# Patient Record
Sex: FEMALE | Race: WHITE | HISPANIC OR LATINO | Employment: FULL TIME | URBAN - METROPOLITAN AREA
[De-identification: names, ages, dates, MRNs, and addresses within clinical notes are randomized per-mention and may not be internally consistent; named-entity substitution may affect disease eponyms.]

---

## 2017-03-07 ENCOUNTER — TRANSFERRED RECORDS (OUTPATIENT)
Dept: HEALTH INFORMATION MANAGEMENT | Facility: CLINIC | Age: 57
End: 2017-03-07

## 2017-03-16 ENCOUNTER — TRANSFERRED RECORDS (OUTPATIENT)
Dept: HEALTH INFORMATION MANAGEMENT | Facility: CLINIC | Age: 57
End: 2017-03-16

## 2017-11-10 ENCOUNTER — TRANSFERRED RECORDS (OUTPATIENT)
Dept: HEALTH INFORMATION MANAGEMENT | Facility: CLINIC | Age: 57
End: 2017-11-10

## 2017-12-21 ENCOUNTER — TRANSFERRED RECORDS (OUTPATIENT)
Dept: HEALTH INFORMATION MANAGEMENT | Facility: CLINIC | Age: 57
End: 2017-12-21

## 2018-01-18 ENCOUNTER — TRANSFERRED RECORDS (OUTPATIENT)
Dept: HEALTH INFORMATION MANAGEMENT | Facility: CLINIC | Age: 58
End: 2018-01-18

## 2018-09-11 ENCOUNTER — TRANSFERRED RECORDS (OUTPATIENT)
Dept: HEALTH INFORMATION MANAGEMENT | Facility: CLINIC | Age: 58
End: 2018-09-11

## 2018-11-19 ENCOUNTER — TRANSFERRED RECORDS (OUTPATIENT)
Dept: HEALTH INFORMATION MANAGEMENT | Facility: CLINIC | Age: 58
End: 2018-11-19

## 2019-02-19 ENCOUNTER — OFFICE VISIT (OUTPATIENT)
Dept: ORTHOPEDICS | Facility: CLINIC | Age: 59
End: 2019-02-19
Payer: COMMERCIAL

## 2019-02-19 VITALS
HEART RATE: 72 BPM | TEMPERATURE: 97.9 F | DIASTOLIC BLOOD PRESSURE: 79 MMHG | OXYGEN SATURATION: 97 % | BODY MASS INDEX: 27.99 KG/M2 | SYSTOLIC BLOOD PRESSURE: 114 MMHG | HEIGHT: 65 IN | RESPIRATION RATE: 13 BRPM | WEIGHT: 168 LBS

## 2019-02-19 DIAGNOSIS — M25.839 ULNOCARPAL IMPINGEMENT SYNDROME: ICD-10-CM

## 2019-02-19 DIAGNOSIS — M21.932: Primary | ICD-10-CM

## 2019-02-19 PROCEDURE — 99203 OFFICE O/P NEW LOW 30 MIN: CPT | Performed by: ORTHOPAEDIC SURGERY

## 2019-02-19 RX ORDER — ESTRADIOL 0.05 MG/D
1 PATCH, EXTENDED RELEASE TRANSDERMAL
COMMUNITY
End: 2022-11-16

## 2019-02-19 RX ORDER — EPINEPHRINE 0.3 MG/.3ML
0.3 INJECTION SUBCUTANEOUS PRN
COMMUNITY

## 2019-02-19 ASSESSMENT — MIFFLIN-ST. JEOR: SCORE: 1342.92

## 2019-02-19 NOTE — PROGRESS NOTES
Kristal Lester is a 58 year old female who is seen as self referral for left wrist pain.  Her problems started with the left wrist and left elbow when she fell on an icy driveway in [place] on 12/21/17. X-rays at that time showed a radial head fracture. This was treated eventually by Dr. Baldemar Garcia.  He also identified ulnocarpal impingement, with an x-ray from clear showing a slight ulnar plus variant and a likely cyst in the lunate.  She reports she has abandoned the elbow pain.  She is most bothered by the wrist pain. She reports she wants a change of Physician.    I reviewed the x-rays from [place] showing a mild ulnar plus variant and a cyst in the lunate    History reviewed. No pertinent past medical history.    History reviewed. No pertinent surgical history.    Family History   Problem Relation Age of Onset     Chronic Obstructive Pulmonary Disease Father      Alzheimer Disease Maternal Grandmother      Parkinsonism Maternal Grandfather        Social History     Socioeconomic History     Marital status:      Spouse name: Not on file     Number of children: Not on file     Years of education: Not on file     Highest education level: Not on file   Social Needs     Financial resource strain: Not on file     Food insecurity - worry: Not on file     Food insecurity - inability: Not on file     Transportation needs - medical: Not on file     Transportation needs - non-medical: Not on file   Occupational History     Not on file   Tobacco Use     Smoking status: Never Smoker   Substance and Sexual Activity     Alcohol use: Not on file     Drug use: Not on file     Sexual activity: Not on file   Other Topics Concern     Not on file   Social History Narrative     Not on file       Current Outpatient Medications   Medication Sig Dispense Refill     EPINEPHrine (EPIPEN/ADRENACLICK/OR ANY BX GENERIC EQUIV) 0.3 MG/0.3ML injection 2-pack Inject 0.3 mg into the muscle as needed for anaphylaxis        "estradiol (VIVELLE-DOT) 0.05 MG/24HR bi-weekly patch Place 1 patch onto the skin twice a week       progesterone (PROMETRIUM) 100 MG capsule Take 100 mg by mouth daily         Allergies   Allergen Reactions     Bees Shortness Of Breath     Cellulitis of the leg       REVIEW OF SYSTEMS:  CONSTITUTIONAL:  NEGATIVE for fever, chills, change in weight, not feeling tired  SKIN:  NEGATIVE for worrisome rashes, no skin lumps, no skin ulcers and no non-healing wounds  EYES:  NEGATIVE for vision changes or irritation.  ENT/MOUTH:  NEGATIVE.  No hearing loss, no hoarseness, no difficulty swallowing.  RESP:  NEGATIVE. No cough or shortness of breath.  CV:  NEGATIVE for chest pain, palpitations or peripheral edema  GI:  NEGATIVE for nausea, abdominal pain, heartburn, or change in bowel habits  :  Negative. No dysuria, no hematuria  MUSCULOSKELETAL:  See HPI above  NEURO:  NEGATIVE . No headaches, no dizziness,  no numbness  ENDOCRINE:  NEGATIVE for temperature intolerance, skin/hair changes  HEME/ALLERGY/IMMUNE:  NEGATIVE for bleeding problems  PSYCHIATRIC:  NEGATIVE. no anxiety, no depression.     Exam:  Vitals: /79   Pulse 72   Temp 97.9  F (36.6  C)   Resp 13   Ht 1.651 m (5' 5\")   Wt 76.2 kg (168 lb)   SpO2 97%   BMI 27.96 kg/m    BMI= Body mass index is 27.96 kg/m .  Constitutional:  healthy, alert and no distress  Neuro: Alert and Oriented x 3, Sensation grossly WNL.  Psych: Affect normal   Respiratory: Breathing not labored.  Cardiovascular: normal peripheral pulses  Lymph: no adenopathy  Skin: No rashes,worrisome lesions or skin problems  Wrist has tenderness at the distal radioulnar joint.  She also has tenderness over the lunate.  She reports shooting pain down into the hand.  She has pain with movements of the wrist in pronation and supination. Mild pain with flexion, extension and radial deviation.  She had negative Tinel over the median, ulnar, and radial nerves.  Sensation, motor and circulation are " intact.    Assessment:  Ulnocarpal impingement syndrome.  Possible distal radioulnar joint pain  Plan:  We discussed possible shortening of the ulna, but that it may require work on the distal radio-ulnar joint also.  I recommended referral to [facility] and surgery department.

## 2019-02-19 NOTE — LETTER
2/19/2019         RE: Kristal Lester  [address] [place] [address]        Dear Colleague,    Thank you for referring your patient, Kristal Lester, to the [facility]. Please see a copy of my visit note below.    Kristal Lester is a 58 year old female who is seen as self referral for left wrist pain.  Her problems started with the left wrist and left elbow when she fell on an icy driveway in [place] on 12/21/17. X-rays at that time showed a radial head fracture. This was treated eventually by Dr. Baldemar Garcia.  He also identified ulnocarpal impingement, with an x-ray from [facility] showing a slight ulnar plus variant and a likely cyst in the lunate.  She reports she has abandoned the elbow pain.  She is most bothered by the wrist pain. She reports she wants a change of Physician.    I reviewed the x-rays from [place] showing a mild ulnar plus variant and a cyst in the lunate    History reviewed. No pertinent past medical history.    History reviewed. No pertinent surgical history.    Family History   Problem Relation Age of Onset     Chronic Obstructive Pulmonary Disease Father      Alzheimer Disease Maternal Grandmother      Parkinsonism Maternal Grandfather        Social History     Socioeconomic History     Marital status:      Spouse name: Not on file     Number of children: Not on file     Years of education: Not on file     Highest education level: Not on file   Social Needs     Financial resource strain: Not on file     Food insecurity - worry: Not on file     Food insecurity - inability: Not on file     Transportation needs - medical: Not on file     Transportation needs - non-medical: Not on file   Occupational History     Not on file   Tobacco Use     Smoking status: Never Smoker   Substance and Sexual Activity     Alcohol use: Not on file     Drug use: Not on file     Sexual activity: Not on file   Other Topics Concern     Not on file   Social History  "Narrative     Not on file       Current Outpatient Medications   Medication Sig Dispense Refill     EPINEPHrine (EPIPEN/ADRENACLICK/OR ANY BX GENERIC EQUIV) 0.3 MG/0.3ML injection 2-pack Inject 0.3 mg into the muscle as needed for anaphylaxis       estradiol (VIVELLE-DOT) 0.05 MG/24HR bi-weekly patch Place 1 patch onto the skin twice a week       progesterone (PROMETRIUM) 100 MG capsule Take 100 mg by mouth daily         Allergies   Allergen Reactions     Bees Shortness Of Breath     Cellulitis of the leg       REVIEW OF SYSTEMS:  CONSTITUTIONAL:  NEGATIVE for fever, chills, change in weight, not feeling tired  SKIN:  NEGATIVE for worrisome rashes, no skin lumps, no skin ulcers and no non-healing wounds  EYES:  NEGATIVE for vision changes or irritation.  ENT/MOUTH:  NEGATIVE.  No hearing loss, no hoarseness, no difficulty swallowing.  RESP:  NEGATIVE. No cough or shortness of breath.  CV:  NEGATIVE for chest pain, palpitations or peripheral edema  GI:  NEGATIVE for nausea, abdominal pain, heartburn, or change in bowel habits  :  Negative. No dysuria, no hematuria  MUSCULOSKELETAL:  See HPI above  NEURO:  NEGATIVE . No headaches, no dizziness,  no numbness  ENDOCRINE:  NEGATIVE for temperature intolerance, skin/hair changes  HEME/ALLERGY/IMMUNE:  NEGATIVE for bleeding problems  PSYCHIATRIC:  NEGATIVE. no anxiety, no depression.     Exam:  Vitals: /79   Pulse 72   Temp 97.9  F (36.6  C)   Resp 13   Ht 1.651 m (5' 5\")   Wt 76.2 kg (168 lb)   SpO2 97%   BMI 27.96 kg/m     BMI= Body mass index is 27.96 kg/m .  Constitutional:  healthy, alert and no distress  Neuro: Alert and Oriented x 3, Sensation grossly WNL.  Psych: Affect normal   Respiratory: Breathing not labored.  Cardiovascular: normal peripheral pulses  Lymph: no adenopathy  Skin: No rashes,worrisome lesions or skin problems  Wrist has tenderness at the distal radioulnar joint.  She also has tenderness over the lunate.  She reports shooting pain " down into the hand.  She has pain with movements of the wrist in pronation and supination. Mild pain with flexion, extension and radial deviation.  She had negative Tinel over the median, ulnar, and radial nerves.  Sensation, motor and circulation are intact.    Assessment:  Ulnocarpal impingement syndrome.  Possible distal radioulnar joint pain  Plan:  We discussed possible shortening of the ulna, but that it may require work on the distal radio-ulnar joint also.  I recommended referral to [facility] and surgery department.    Again, thank you for allowing me to participate in the care of your patient.        Sincerely,        Santiago Coyne MD

## 2019-02-21 ENCOUNTER — DOCUMENTATION ONLY (OUTPATIENT)
Dept: CARE COORDINATION | Facility: CLINIC | Age: 59
End: 2019-02-21

## 2019-03-04 DIAGNOSIS — M25.539 WRIST PAIN: Primary | ICD-10-CM

## 2019-03-04 DIAGNOSIS — M25.529 ELBOW PAIN: Primary | ICD-10-CM

## 2019-03-04 NOTE — PROGRESS NOTES
[facility]  Orthopedics  Josh Vicente MD  2019     Name: Kristal Lester  MRN: [id number]  Age: 58 year old  : 1960  Referring provider: Santiago Coyne     Chief Complaint: Left wrist pain    History of Present Illness:   Kristal Lester is a 58 year old, right handed female who presents today for evaluation of her left wrist.  The patient reports she had no problems with her left wrist until 1 year ago.  She fell onto her left upper extremity about 1 year ago. She had left elbow and wrist pain and was diagnosed with a left radial head fracture.  She was treated nonoperatively at [facility] orthopedics.  She was also diagnosed with left ulnocarpal abutment syndrome from an ulnar positive deformity.  She was treated with a corticosteroid injection into the left wrist which she reports 0 relief from.  She continues to have pain in her left wrist which is getting worse.  The pain is located over her dorsal left wrist.  She reports that it radiates both into her distal forearm, and into her dorsal hand to the MP joints.  Her pain is worse with any activity including gripping, typing, pinching, or lifting objects.  She is having difficulty with using her left upper extremity for anything due to this pain.  Other than the injection, she has not had any treatment for her left wrist.  She does have some numbness and tingling over the dorsum of her hand, which does radiate into her thumb, index finger, middle finger and ring finger.  She comes in today for another opinion regarding her left wrist.    Review of Systems:   A 10-point review of systems was obtained and is negative except for as noted in the HPI.     Medications:   Current Outpatient Medications:      EPINEPHrine (EPIPEN/ADRENACLICK/OR ANY BX GENERIC EQUIV) 0.3 MG/0.3ML injection 2-pack, Inject 0.3 mg into the muscle as needed for anaphylaxis, Disp: , Rfl:      estradiol (VIVELLE-DOT) 0.05 MG/24HR bi-weekly patch, Place 1 patch onto the  "skin twice a week, Disp: , Rfl:      progesterone (PROMETRIUM) 100 MG capsule, Take 100 mg by mouth daily, Disp: , Rfl:     Allergies:  Bees     Past Medical History:  She reports chronic pain in her sacroiliac joints as well as low back and neck pain.  Reports her primary care provider has recommended seeing a rheumatologist, but she has not pursued this as of yet.    Past Surgical History:  No relevant previous surgeries.    Social History:  Patient lives with her wife and 2 children.  She works a desk job, with lots of typing and mouse work.  She is a non-smoker.    Family History:  chronic obstructive pulmonary disease - father  Alzheimer disease - maternal grandmother   Parkinsonism - Maternal grandfather     Physical Examination:  Height 1.651 m (5' 5\"), weight 76.2 kg (168 lb).   General: Well-appearing female, no apparent distress.  Neuro: Patient is alert and interactive, no active facial droop.  Respiratory: Breathing unlabored on room air, there is no wheezing.  Cardiovascular: Extremities are warm well perfused, no peripheral edema.  Left upper extremity: Focused examination of the left upper extremity reveals no obvious deformity.  The patient is mildly tender to palpation over her radial capitellar joint proximally.  She has full elbow range of motion for flexion, extension, pronation, and supination.  Distally over the wrist her skin is intact.  There is no significant swelling.  The patient is markedly tender to palpation over the radiocarpal joint as well as the lunate.  She is tender to palpation over the ulnocarpal joint to a lesser degree.  She does have a stable DRUJ, but does have some mild crepitus with volar and dorsal translation of the ulna.  She is tender to palpation over the distal carpus as well as the metacarpals over the dorsal hand.  She fires EPL/FPL/intrinsics.  4 out of 5 strength for palmar abduction of her thumb.  Sensation decreased to light touch in the median nerve " distribution compared to the ulnar and radial nerve distributions.  Positive Tinel's over the carpal tunnel as well as positive carpal compression test.  Palpable radial pulse, fingers warm and well-perfused.    Imaging:   Most recent elbow x-rays from 9/11/2018 are reviewed.  There is a healed radial head fracture with a 2.5 mm joint step-off.    3 views of the left wrist from today's date are reviewed.  There is a small cyst in the ulnar lunate.  1-2 mm of ulnar positive deformity.  No significant joint space narrowing.  No fractures are noted.    I have independently reviewed the above imaging studies; the results were discussed with the patient.     Assessment:   58 year old, right handed female with left wrist pain, numbness, and tingling.  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  Exam with concern for DRUJ crepitus, tenderness to palpation over the lunate, and signs concerning for carpal tunnel syndrome.    Plan:     We had a good discussion with the patient and her partner about her findings today.  We discussed that based on history and exam, the etiology of her pain is not entirely clear.  It seems that a portion of her pain is coming from the DRUJ and and the ulnocarpal joint. She is minimally ulnar positive. She also has numbness and tingling, with some exacerbation of her symptoms with Tinel's and carpal compression test.     Given the above we feel further diagnostic testing is warranted.  The patient will be referred for an EMG of the left upper extremity.  She does have some history of cervical spine degeneration, and the EMG will help determine if the patient has carpal tunnel syndrome and if there is any contribution from her cervical spine.    An MRI of the left wrist was ordered to further characterize the patient's lunate cyst, and to evaluate for degenerative change in the general health of the cartilage of the joint.    Patient has a history of sacroiliac joint issues,  and has had 2 injections.  She requests a referral to an SI joint specialist, which was provided for her today.    Patient will follow-up after the above tests are completed for further discussion of management.    The patient was seen and discussed with staff surgeon Dr. Vicente who was in agreement with the above assessment and plan.     Karlo Manriquez MD  PGY-4  Orthopaedic Surgery  [phone]    I, Josh Vicente, saw and evaluated this patient with the resident and agree with the resident s findings and plan of care as documented in the resident s note.           Answers for HPI/ROS submitted by the patient on 3/6/2019   General Symptoms: No  Skin Symptoms: No  HENT Symptoms: No  EYE SYMPTOMS: No  HEART SYMPTOMS: No  LUNG SYMPTOMS: No  INTESTINAL SYMPTOMS: Yes  URINARY SYMPTOMS: No  GYNECOLOGIC SYMPTOMS: No  BREAST SYMPTOMS: No  SKELETAL SYMPTOMS: Yes  BLOOD SYMPTOMS: No  NERVOUS SYSTEM SYMPTOMS: No  MENTAL HEALTH SYMPTOMS: No  Heart burn or indigestion: No  Nausea: No  Vomiting: No  Abdominal pain: No  Bloating: No  Constipation: Yes  Diarrhea: No  Blood in stool: No  Black stools: No  Rectal or Anal pain: Yes  Fecal incontinence: No  Yellowing of skin or eyes: No  Vomit with blood: No  Change in stools: No  Back pain: Yes  Muscle aches: Yes  Neck pain: Yes  Swollen joints: No  Joint pain: Yes  Bone pain: No  Muscle cramps: No  Muscle weakness: No  Joint stiffness: Yes  Bone fracture: No

## 2019-03-04 NOTE — TELEPHONE ENCOUNTER
RECORDS RECEIVED FROM: left wrist ulnocarpal impingement syndrome, ortho con referral. Pt seen at [facility] prior, she has recs and imaging in her possession.   DATE RECEIVED: 03/04/19    NOTES STATUS DETAILS   OFFICE NOTE from referring provider Internal Dr. Coyne   OFFICE NOTE from other specialist N/A    DISCHARGE SUMMARY from hospital N/A    DISCHARGE REPORT from the ER [facility] Everywhere 12/21/17   OPERATIVE REPORT N/A    MEDICATION LIST Internal    IMPLANT RECORD/STICKER N/A    LABS     CBC/DIFF N/A    CULTURES N/A    INJECTIONS DONE IN RADIOLOGY N/A    MRI received    CT SCAN N/A    XRAYS (IMAGES & REPORTS) received    TUMOR     PATHOLOGY  Slides & report N/A    '  03/04/19  12:38 PM faxed request to [facility]    03/05/19  3:10 PM patient has been seen at [facility], but never for her wrist.   3:15 PM faxed request for xray to [facility]    03/06/19  7:35 AM 2nd request to [facility] for xray

## 2019-03-06 ENCOUNTER — OFFICE VISIT (OUTPATIENT)
Dept: ORTHOPEDICS | Facility: CLINIC | Age: 59
End: 2019-03-06
Payer: COMMERCIAL

## 2019-03-06 ENCOUNTER — PRE VISIT (OUTPATIENT)
Dept: ORTHOPEDICS | Facility: CLINIC | Age: 59
End: 2019-03-06

## 2019-03-06 ENCOUNTER — ANCILLARY PROCEDURE (OUTPATIENT)
Dept: GENERAL RADIOLOGY | Facility: CLINIC | Age: 59
End: 2019-03-06
Attending: ORTHOPAEDIC SURGERY
Payer: COMMERCIAL

## 2019-03-06 VITALS — HEIGHT: 65 IN | BODY MASS INDEX: 27.99 KG/M2 | WEIGHT: 168 LBS

## 2019-03-06 DIAGNOSIS — M25.532 LEFT WRIST PAIN: Primary | ICD-10-CM

## 2019-03-06 DIAGNOSIS — M25.539 WRIST PAIN: ICD-10-CM

## 2019-03-06 ASSESSMENT — ENCOUNTER SYMPTOMS
JOINT SWELLING: 0
MUSCLE CRAMPS: 0
BLOOD IN STOOL: 0
BOWEL INCONTINENCE: 0
MUSCLE WEAKNESS: 0
CONSTIPATION: 1
STIFFNESS: 1
JAUNDICE: 0
HEARTBURN: 0
RECTAL PAIN: 1
MYALGIAS: 1
NECK PAIN: 1
DIARRHEA: 0
NAUSEA: 0
ABDOMINAL PAIN: 0
ARTHRALGIAS: 1
BLOATING: 0
VOMITING: 0
BACK PAIN: 1

## 2019-03-06 ASSESSMENT — MIFFLIN-ST. JEOR: SCORE: 1342.92

## 2019-03-06 NOTE — LETTER
3/6/2019       RE: Kristal Lester  [address]  [place] [address]     Dear Colleague,    Thank you for referring your patient, Kristal Lester, to the [facility]TH[facility] at [facility]. Please see a copy of my visit note below.    [facility]  Orthopedics  Josh Vicente MD  2019     Name: Kristal Lester  MRN: [id number]  Age: 58 year old  : 1960  Referring provider: Santiago Coyne     Chief Complaint: Left wrist pain    History of Present Illness:   Kristal Lester is a 58 year old, right handed female who presents today for evaluation of her left wrist.  The patient reports she had no problems with her left wrist until 1 year ago.  She fell onto her left upper extremity about 1 year ago. She had left elbow and wrist pain and was diagnosed with a left radial head fracture.  She was treated nonoperatively at [facility] orthopedics.  She was also diagnosed with left ulnocarpal abutment syndrome from an ulnar positive deformity.  She was treated with a corticosteroid injection into the left wrist which she reports 0 relief from.  She continues to have pain in her left wrist which is getting worse.  The pain is located over her dorsal left wrist.  She reports that it radiates both into her distal forearm, and into her dorsal hand to the MP joints.  Her pain is worse with any activity including gripping, typing, pinching, or lifting objects.  She is having difficulty with using her left upper extremity for anything due to this pain.  Other than the injection, she has not had any treatment for her left wrist.  She does have some numbness and tingling over the dorsum of her hand, which does radiate into her thumb, index finger, middle finger and ring finger.  She comes in today for another opinion regarding her left wrist.    Review of Systems:   A 10-point review of systems was obtained and is negative except for as noted in the HPI.  "    Medications:   Current Outpatient Medications:      EPINEPHrine (EPIPEN/ADRENACLICK/OR ANY BX GENERIC EQUIV) 0.3 MG/0.3ML injection 2-pack, Inject 0.3 mg into the muscle as needed for anaphylaxis, Disp: , Rfl:      estradiol (VIVELLE-DOT) 0.05 MG/24HR bi-weekly patch, Place 1 patch onto the skin twice a week, Disp: , Rfl:      progesterone (PROMETRIUM) 100 MG capsule, Take 100 mg by mouth daily, Disp: , Rfl:     Allergies:  Bees     Past Medical History:  She reports chronic pain in her sacroiliac joints as well as low back and neck pain.  Reports her primary care provider has recommended seeing a rheumatologist, but she has not pursued this as of yet.    Past Surgical History:  No relevant previous surgeries.    Social History:  Patient lives with her wife and 2 children.  She works a desk job, with lots of typing and mouse work.  She is a non-smoker.    Family History:  chronic obstructive pulmonary disease - father  Alzheimer disease - maternal grandmother   Parkinsonism - Maternal grandfather     Physical Examination:  Height 1.651 m (5' 5\"), weight 76.2 kg (168 lb).   General: Well-appearing female, no apparent distress.  Neuro: Patient is alert and interactive, no active facial droop.  Respiratory: Breathing unlabored on room air, there is no wheezing.  Cardiovascular: Extremities are warm well perfused, no peripheral edema.  Left upper extremity: Focused examination of the left upper extremity reveals no obvious deformity.  The patient is mildly tender to palpation over her radial capitellar joint proximally.  She has full elbow range of motion for flexion, extension, pronation, and supination.  Distally over the wrist her skin is intact.  There is no significant swelling.  The patient is markedly tender to palpation over the radiocarpal joint as well as the lunate.  She is tender to palpation over the ulnocarpal joint to a lesser degree.  She does have a stable DRUJ, but does have some mild crepitus with " volar and dorsal translation of the ulna.  She is tender to palpation over the distal carpus as well as the metacarpals over the dorsal hand.  She fires EPL/FPL/intrinsics.  4 out of 5 strength for palmar abduction of her thumb.  Sensation decreased to light touch in the median nerve distribution compared to the ulnar and radial nerve distributions.  Positive Tinel's over the carpal tunnel as well as positive carpal compression test.  Palpable radial pulse, fingers warm and well-perfused.    Imaging:   Most recent elbow x-rays from 9/11/2018 are reviewed.  There is a healed radial head fracture with a 2.5 mm joint step-off.    3 views of the left wrist from today's date are reviewed.  There is a small cyst in the ulnar lunate.  1-2 mm of ulnar positive deformity.  No significant joint space narrowing.  No fractures are noted.    I have independently reviewed the above imaging studies; the results were discussed with the patient.     Assessment:   58 year old, right handed female with left wrist pain, numbness, and tingling.  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  Exam with concern for DRUJ crepitus, tenderness to palpation over the lunate, and signs concerning for carpal tunnel syndrome.    Plan:     We had a good discussion with the patient and her partner about her findings today.  We discussed that based on history and exam, the etiology of her pain is not entirely clear.  It seems that a portion of her pain is coming from the DRUJ and and the ulnocarpal joint. She is minimally ulnar positive. She also has numbness and tingling, with some exacerbation of her symptoms with Tinel's and carpal compression test.     Given the above we feel further diagnostic testing is warranted.  The patient will be referred for an EMG of the left upper extremity.  She does have some history of cervical spine degeneration, and the EMG will help determine if the patient has carpal tunnel syndrome and if  there is any contribution from her cervical spine.    An MRI of the left wrist was ordered to further characterize the patient's lunate cyst, and to evaluate for degenerative change in the general health of the cartilage of the joint.    Patient has a history of sacroiliac joint issues, and has had 2 injections.  She requests a referral to an SI joint specialist, which was provided for her today.    Patient will follow-up after the above tests are completed for further discussion of management.    The patient was seen and discussed with staff surgeon Dr. Vicente who was in agreement with the above assessment and plan.     Karlo Manriquez MD  PGY-4  Orthopaedic Surgery  [phone]    I, Josh Vicente, saw and evaluated this patient with the resident and agree with the resident s findings and plan of care as documented in the resident s note.

## 2019-03-06 NOTE — NURSING NOTE
"Reason For Visit:   Chief Complaint   Patient presents with     RECHECK     Left wrist ulnocarpal imingement syndrome       Primary MD: No Ref-Primary, Physician  Ref. MD: Santiago Coyne    ?  No    Age: 58 year old    Occupation [employer].  Currently working? Yes.  Work status?  Full time.  Date of injury: Dec 2017  Type of injury: Fell on driveway, broke elbow and irritated wrist.  Date of surgery: NA  Type of surgery: NA.  Smoker: No  Request smoking cessation information: No      Ht 1.651 m (5' 5\")   Wt 76.2 kg (168 lb)   BMI 27.96 kg/m        Pain Assessment  Patient Currently in Pain: Yes  0-10 Pain Scale: 10(when using hand)               QuickDASH Assessment  No flowsheet data found.       Allergies   Allergen Reactions     Bees Shortness Of Breath     Cellulitis of the leg       Kera Velázquez, ATC      "

## 2019-03-07 ENCOUNTER — TELEPHONE (OUTPATIENT)
Dept: ORTHOPEDICS | Facility: CLINIC | Age: 59
End: 2019-03-07

## 2019-03-07 NOTE — TELEPHONE ENCOUNTER
Pt has NEW appt with Dr Urbina next week.  Pt phoned to say since she is undergoing an MRI tomorrow for her hand, she is asking if she may have a back MRI at the same time since she will be medicated for claustrophobia. We reviewed pt's symptoms, she states her Right SI joint is what is causing her the most pain.  She also states she has low back, right hip, and neck pain, but the SI is what she would like to address at the appt next week with Dr Urbina.  Pt was advised to come to clinic.  No advanced imaging needed for SI joint pain.  We have an MRI lumbar spine just over one year old.  Pt agreed to come for assessment before further imaging.  Pt added that she has undergone additional imaging and will bring a disk with those images.  Arin Vallejo RN

## 2019-03-07 NOTE — TELEPHONE ENCOUNTER
RECORDS RECEIVED FROM: Rt SI joint pain, Chronic Low back pain. Appt per pt. Referred by Dr Vicente.   DATE RECEIVED: 03/07/19    NOTES STATUS DETAILS   OFFICE NOTE from referring provider Internal Dr. Vicente 3/6/19    OFFICE NOTE from other specialist Received - Epic Dr. Garcia 9/11/18   DISCHARGE SUMMARY from hospital N/A    DISCHARGE REPORT from the ER N/A    OPERATIVE REPORT N/A    MEDICATION LIST Internal    IMPLANT RECORD/STICKER N/A    LABS     CBC/DIFF N/A    CULTURES N/A    INJECTIONS DONE IN RADIOLOGY N/A    MRI Received 11/10/17   CT SCAN N/A    XRAYS (IMAGES & REPORTS) Received 11/19/18, 1/9/18   TUMOR     PATHOLOGY  Slides & report N/A

## 2019-03-08 ENCOUNTER — ANCILLARY PROCEDURE (OUTPATIENT)
Dept: MRI IMAGING | Facility: CLINIC | Age: 59
End: 2019-03-08
Attending: ORTHOPAEDIC SURGERY
Payer: COMMERCIAL

## 2019-03-08 DIAGNOSIS — M25.532 LEFT WRIST PAIN: ICD-10-CM

## 2019-03-09 ENCOUNTER — HEALTH MAINTENANCE LETTER (OUTPATIENT)
Age: 59
End: 2019-03-09

## 2019-03-11 DIAGNOSIS — M54.50 LUMBAR PAIN: Primary | ICD-10-CM

## 2019-03-12 ENCOUNTER — PRE VISIT (OUTPATIENT)
Dept: ORTHOPEDICS | Facility: CLINIC | Age: 59
End: 2019-03-12

## 2019-03-12 ENCOUNTER — TELEPHONE (OUTPATIENT)
Dept: ORTHOPEDICS | Facility: CLINIC | Age: 59
End: 2019-03-12

## 2019-03-12 DIAGNOSIS — M54.16 LUMBAR RADICULOPATHY: Primary | ICD-10-CM

## 2019-03-12 NOTE — TELEPHONE ENCOUNTER
Called patient and stated that she would need an MRI before seeing Dr. Urbina. I ordered and scheduled her for a lumbar MRI tomorrow at 6:00 PM. If this time doesn't work for her I gave her the radiology scheduling number [phone] to change it being they had openings at different times. Also gave her the call center number [phone] to reschedule the appointment she had today for this Friday at her convenience.

## 2019-03-13 ENCOUNTER — ANCILLARY PROCEDURE (OUTPATIENT)
Dept: MRI IMAGING | Facility: CLINIC | Age: 59
End: 2019-03-13
Attending: ORTHOPAEDIC SURGERY
Payer: COMMERCIAL

## 2019-03-13 DIAGNOSIS — M54.16 LUMBAR RADICULOPATHY: ICD-10-CM

## 2019-03-15 ENCOUNTER — OFFICE VISIT (OUTPATIENT)
Dept: ORTHOPEDICS | Facility: CLINIC | Age: 59
End: 2019-03-15
Attending: ORTHOPAEDIC SURGERY
Payer: COMMERCIAL

## 2019-03-15 ENCOUNTER — ANCILLARY PROCEDURE (OUTPATIENT)
Dept: GENERAL RADIOLOGY | Facility: CLINIC | Age: 59
End: 2019-03-15
Attending: ORTHOPAEDIC SURGERY
Payer: COMMERCIAL

## 2019-03-15 VITALS — HEIGHT: 65 IN | BODY MASS INDEX: 27.99 KG/M2 | WEIGHT: 168 LBS

## 2019-03-15 DIAGNOSIS — G89.29 CHRONIC LEFT SI JOINT PAIN: Primary | ICD-10-CM

## 2019-03-15 DIAGNOSIS — M53.3 CHRONIC LEFT SI JOINT PAIN: Primary | ICD-10-CM

## 2019-03-15 DIAGNOSIS — G89.29 CHRONIC RIGHT SI JOINT PAIN: ICD-10-CM

## 2019-03-15 DIAGNOSIS — M53.3 CHRONIC RIGHT SI JOINT PAIN: ICD-10-CM

## 2019-03-15 ASSESSMENT — ENCOUNTER SYMPTOMS
BACK PAIN: 1
JAUNDICE: 0
HEARTBURN: 0
BLOOD IN STOOL: 0
ARTHRALGIAS: 1
VOMITING: 0
JOINT SWELLING: 0
MYALGIAS: 1
STIFFNESS: 1
BOWEL INCONTINENCE: 0
NAUSEA: 0
ABDOMINAL PAIN: 0
NECK PAIN: 1
MUSCLE WEAKNESS: 1
RECTAL PAIN: 1
BLOATING: 0
DIARRHEA: 1
CONSTIPATION: 1
MUSCLE CRAMPS: 0

## 2019-03-15 ASSESSMENT — MIFFLIN-ST. JEOR: SCORE: 1342.92

## 2019-03-15 NOTE — NURSING NOTE
"Reason For Visit:   Chief Complaint   Patient presents with     Consult     chronic low back and right SI joint pain        Primary MD: Leah Ref-Primary, Physician  Ref. MD: Jules    ?  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 Yes.  Work status?  Full time.  Date of injury: 2016  Type of injury: chronic .  Date of surgery: none   Type of surgery: none .  Smoker: No  Request smoking cessation information: No    Ht 1.651 m (5' 5\")   Wt 76.2 kg (168 lb)   BMI 27.96 kg/m      Pain Assessment  Patient Currently in Pain: Yes  0-10 Pain Scale: 7  Primary Pain Location: Back  Pain Descriptors: (right side worse than left )    Oswestry (NIKHIL) Questionnaire    OSWESTRY DISABILITY INDEX 3/15/2019   Count 10   Sum 14   Oswestry Score (%) 28            Neck Disability Index (NDI) Questionnaire    No flowsheet data found.                Promis 10 Assessment    PROMIS 10 3/15/2019   In general, would you say your health is: Good   In general, would you say your quality of life is: Good   In general, how would you rate your physical health? Fair   In general, how would you rate your mental health, including your mood and your ability to think? Very good   In general, how would you rate your satisfaction with your social activities and relationships? Good   In general, please rate how well you carry out your usual social activities and roles Good   To what extent are you able to carry out your everyday physical activities such as walking, climbing stairs, carrying groceries, or moving a chair? Moderately   How often have you been bothered by emotional problems such as feeling anxious, depressed or irritable? Rarely   How would you rate your fatigue on average? Mild   How would you rate your pain on average?   0 = No Pain  to  10 = Worst Imaginable Pain 7   In general, would you say your health is: 3   In general, would you say your quality of life is: 3   In general, how would you rate your physical health? 2   In " general, how would you rate your mental health, including your mood and your ability to think? 4   In general, how would you rate your satisfaction with your social activities and relationships? 3   In general, please rate how well you carry out your usual social activities and roles. (This includes activities at home, at work and in your community, and responsibilities as a parent, child, spouse, employee, friend, etc.) 3   To what extent are you able to carry out your everyday physical activities such as walking, climbing stairs, carrying groceries, or moving a chair? 3   In the past 7 days, how often have you been bothered by emotional problems such as feeling anxious, depressed, or irritable? 2   In the past 7 days, how would you rate your fatigue on average? 2   In the past 7 days, how would you rate your pain on average, where 0 means no pain, and 10 means worst imaginable pain? 7   Global Mental Health Score 14   Global Physical Health Score 11   PROMIS TOTAL - SUBSCORES 25                Tae Canseco, ATC

## 2019-03-15 NOTE — PROGRESS NOTES
Spine Surgery Consultation    REFERRING PHYSICIAN: Josh Vicente   PRIMARY CARE PHYSICIAN: No Ref-Primary, Physician           Chief Complaint:   Pain at right-sided sacroiliac joint    History of Present Illness:  Symptom Profile Including: location of symptoms, onset, severity, exacerbating/alleviating factors, previous treatments:        Kristal Lester is a 58 year old female who resents today for evaluation of right-sided sacroiliac joint pain.  She also has pain in her neck and low back but her major concern today is sacroiliac joint pain.  This is worst when she sits down for greater than 15 minutes she then has difficulty with standing and beginning movements once again.  Once she is up and moving pain is generally tolerable.  She feels that something misplaces as she transitions from a chair to standing, she gets a jolt that she would rate her 10/10 for pain and causes her to move very cautiously for several seconds to a minute until pain subsides.  This happens about every other day.  She has been seeing a chiropractor for about 6 months.  This does help for short-term relief after sessions.  She does do some guided exercises there.  She stopped running as an exercise because of exacerbations of pain.  She will be awoken and night with pain if she rolls over she will get a jolting pain that happens 2-3 times per night and disrupts her sleep.  She does walk with a limp in the right leg in the mornings until she loosens up a bit.  She has had treatments with [facility] she states 2 injections to her right-sided SI joint, fluoroscopy guided, neither which gave her long-term relief.  She is not certain if they gave her short-term relief or not and she really cannot recall.  One was at [facility] and the other at a vein clinic.  She cannot make a statement as to short-term relief.  One injection also to right-sided L4-5, a transforaminal and right-sided trochanteric bursa.  None of these were  "helpful.         Past Medical History:   No past medical history on file.         Past Surgical History:   No past surgical history on file.         Social History:     Social History     Tobacco Use     Smoking status: Never Smoker   Substance Use Topics     Alcohol use: Not on file            Family History:     Family History   Problem Relation Age of Onset     Chronic Obstructive Pulmonary Disease Father      Alzheimer Disease Maternal Grandmother      Parkinsonism Maternal Grandfather             Allergies:     Allergies   Allergen Reactions     Bees Shortness Of Breath     Cellulitis of the leg            Medications:     Current Outpatient Medications   Medication     EPINEPHrine (EPIPEN/ADRENACLICK/OR ANY BX GENERIC EQUIV) 0.3 MG/0.3ML injection 2-pack     estradiol (VIVELLE-DOT) 0.05 MG/24HR bi-weekly patch     progesterone (PROMETRIUM) 100 MG capsule     No current facility-administered medications for this visit.              Review of Systems:     A 10 point ROS was performed and reviewed. Specific responses to these questions are noted at the end of the document.         Physical Exam:   Vitals: Ht 1.651 m (5' 5\")   Wt 76.2 kg (168 lb)   BMI 27.96 kg/m    Constitutional: awake, alert, cooperative, no apparent distress, appears stated age.    Eyes: The sclera are white.  Ears, Nose, Throat: The trachea is midline.  Psychiatric: The patient has a normal affect.  Respiratory: breathing non-labored  Cardiovascular: The extremities are warm and perfused.  Skin: no obvious rashes or lesions.  Musculoskeletal, Neurologic, and Spine:   Patient is a 58-year-old female in no acute distress sitting in the exam room.  She is alert and oriented x3.  She stands with a normal erect posture.  Transitional moves are performed with medium accessory use of the arms.  She does ambulate with an antalgic gait favoring the right side.  She is able to stand on her heels and toes.  She can forward flex to about 3 hands " breadth to the floor.  She can extend about 30 degrees, this elicits low back pain she can lean 30 degrees and rotate 45 degrees bilaterally without difficulty.  Squat is well done.  Sensation is intact and equal to medial and lateral calves and feet and anterior thighs.  Strength is 5/5 bilaterally throughout for hamstrings, quads, great toe plantar dorsiflexion, EHL anterior tib, gastrocsoleus, hip flexors.  Nerve tension signs are negative bilaterally.  She is minimally tender to palpation at the lower lumbar paraspinal musculature but exquisitely so at right-sided proximal sacroiliac joint.  Minimally tender to trochanters bilaterally.  She has no clonus.  DTRs: 2+ at bilateral knees 2+ at bilateral Achilles.    Condition: degenerative sacroiliitis/disruption  Time: 5 years/months  SI provocation tests:    Right Left   Carmina Finger Test  + -   VANESA  + -   Thigh Thrust: + -   Pelvic Compression Test  + -   Palpation  + -   Pelvic Gapping  + -   Gaenslen s Test  + -   Sacral Thrust (SI) mild -   Injections: 2 with no noted long term relief  Physical therapy: Only guided with Chiropractor  Any other treatments (accupuncture, biofeedback, manipulation, etc.): Chiropractor as noted  Lifestyle changes due to condition: Gave up running, other ADLs           Imaging:   We ordered and independently reviewed new radiographs at this clinic visit. The results were discussed with the patient.  Findings include:    March 13, 2019 lumbar MRI study moderate diffuse lumbar spondylosis with no severe central or foraminal stenosis at any visualized level.    AP and lateral flexion-extension lumbar radiographs show moderate diffuse degenerative lumbar changes with a mild coronal plane deformity with some lateral listhesis of L3 on L4 and L1 on L2         Assessment and Plan:   Assessment:  58 year old female with severe right sacroiliac dysfunction.  Grossly positive sacroiliac stress tests.  Her past response to injections is  not clear because she really cannot remember what happened in 2017.     Plan:  1. Patient has concerns with insurance changing over at the beginning of next month and large deductibles.  We suggested that she proceed with a CT-guided right-sided sacroiliac injection and keep a pain diary, tracking her reaction to the injection from the first minutes and hours through until she is seen in follow-up for evaluation.  We did discuss minimally invasive sacroiliac joint fusion with her she was given info on the procedure.  We will meet with her shortly after she gets the injection to discuss her reaction to that.  She was given a referral for formal physical therapy and a handout describing SI fusion.  She was grateful for the time spent in information obtained.  She will call with questions or concerns.  We will follow-up after her injection has been obtained.    Attending MD (Dr. Mac Urbina) :  I reviewed and verified the history and physical exam of the patient and discussed the patient's management with the other clinical providers involved in this patient's care including any involved residents or Physician Assistant. I reviewed the above note and agree with the documented findings and plan of care, which were communicated to the patient.      Mac Urbina MD        Respectfully,  Nikolay Agustin PA-C  Spine Surgery  [facility]      Answers for HPI/ROS submitted by the patient on 3/12/2019   General Symptoms: No  Skin Symptoms: No  HENT Symptoms: No  EYE SYMPTOMS: No  HEART SYMPTOMS: No  LUNG SYMPTOMS: No  INTESTINAL SYMPTOMS: Yes  URINARY SYMPTOMS: No  GYNECOLOGIC SYMPTOMS: No  BREAST SYMPTOMS: No  SKELETAL SYMPTOMS: Yes  BLOOD SYMPTOMS: No  NERVOUS SYSTEM SYMPTOMS: No  MENTAL HEALTH SYMPTOMS: No  Heart burn or indigestion: No  Nausea: No  Vomiting: No  Abdominal pain: No  Bloating: No  Constipation: Yes  Diarrhea: Yes  Blood in stool: No  Black stools: No  Rectal or Anal pain:  Yes  Fecal incontinence: No  Yellowing of skin or eyes: No  Vomit with blood: No  Change in stools: No  Back pain: Yes  Muscle aches: Yes  Neck pain: Yes  Swollen joints: No  Joint pain: Yes  Bone pain: Yes  Muscle cramps: No  Muscle weakness: Yes  Joint stiffness: Yes  Bone fracture: Yes

## 2019-03-15 NOTE — LETTER
3/15/2019       RE: Kristal Lester  [address]  [place] [id number]     Dear Colleague,    Thank you for referring your patient, Kristal Lester, to the [facility]. Please see a copy of my visit note below.    Spine Surgery Consultation    REFERRING PHYSICIAN: Josh Vicente   PRIMARY CARE PHYSICIAN: No Ref-Primary, Physician           Chief Complaint:   Pain at right-sided sacroiliac joint    History of Present Illness:  Symptom Profile Including: location of symptoms, onset, severity, exacerbating/alleviating factors, previous treatments:        Kristal Lester is a 58 year old female who resents today for evaluation of right-sided sacroiliac joint pain.  She also has pain in her neck and low back but her major concern today is sacroiliac joint pain.  This is worst when she sits down for greater than 15 minutes she then has difficulty with standing and beginning movements once again.  Once she is up and moving pain is generally tolerable.  She feels that something misplaces as she transitions from a chair to standing, she gets a jolt that she would rate her 10/10 for pain and causes her to move very cautiously for several seconds to a minute until pain subsides.  This happens about every other day.  She has been seeing a chiropractor for about 6 months.  This does help for short-term relief after sessions.  She does do some guided exercises there.  She stopped running as an exercise because of exacerbations of pain.  She will be awoken and night with pain if she rolls over she will get a jolting pain that happens 2-3 times per night and disrupts her sleep.  She does walk with a limp in the right leg in the mornings until she loosens up a bit.  She has had treatments with [facility] she states 2 injections to her right-sided SI joint, fluoroscopy guided, neither which gave her long-term relief.  She is not certain if they  "gave her short-term relief or not and she really cannot recall.  One was at [facility] and the other at a vein clinic.  She cannot make a statement as to short-term relief.  One injection also to right-sided L4-5, a transforaminal and right-sided trochanteric bursa.  None of these were helpful.         Past Medical History:   No past medical history on file.         Past Surgical History:   No past surgical history on file.         Social History:     Social History     Tobacco Use     Smoking status: Never Smoker   Substance Use Topics     Alcohol use: Not on file            Family History:     Family History   Problem Relation Age of Onset     Chronic Obstructive Pulmonary Disease Father      Alzheimer Disease Maternal Grandmother      Parkinsonism Maternal Grandfather             Allergies:     Allergies   Allergen Reactions     Bees Shortness Of Breath     Cellulitis of the leg            Medications:     Current Outpatient Medications   Medication     EPINEPHrine (EPIPEN/ADRENACLICK/OR ANY BX GENERIC EQUIV) 0.3 MG/0.3ML injection 2-pack     estradiol (VIVELLE-DOT) 0.05 MG/24HR bi-weekly patch     progesterone (PROMETRIUM) 100 MG capsule     No current facility-administered medications for this visit.              Review of Systems:     A 10 point ROS was performed and reviewed. Specific responses to these questions are noted at the end of the document.         Physical Exam:   Vitals: Ht 1.651 m (5' 5\")   Wt 76.2 kg (168 lb)   BMI 27.96 kg/m     Constitutional: awake, alert, cooperative, no apparent distress, appears stated age.    Eyes: The sclera are white.  Ears, Nose, Throat: The trachea is midline.  Psychiatric: The patient has a normal affect.  Respiratory: breathing non-labored  Cardiovascular: The extremities are warm and perfused.  Skin: no obvious rashes or lesions.  Musculoskeletal, Neurologic, and Spine:   Patient is a 58-year-old female in no acute distress sitting in the exam room.  She is alert and " oriented x3.  She stands with a normal erect posture.  Transitional moves are performed with medium accessory use of the arms.  She does ambulate with an antalgic gait favoring the right side.  She is able to stand on her heels and toes.  She can forward flex to about 3 hands breadth to the floor.  She can extend about 30 degrees, this elicits low back pain she can lean 30 degrees and rotate 45 degrees bilaterally without difficulty.  Squat is well done.  Sensation is intact and equal to medial and lateral calves and feet and anterior thighs.  Strength is 5/5 bilaterally throughout for hamstrings, quads, great toe plantar dorsiflexion, EHL anterior tib, gastrocsoleus, hip flexors.  Nerve tension signs are negative bilaterally.  She is minimally tender to palpation at the lower lumbar paraspinal musculature but exquisitely so at right-sided proximal sacroiliac joint.  Minimally tender to trochanters bilaterally.  She has no clonus.  DTRs: 2+ at bilateral knees 2+ at bilateral Achilles.    Condition: degenerative sacroiliitis/disruption  Time: 5 years/months  SI provocation tests:    Right Left   Carmina Finger Test  + -   VANESA  + -   Thigh Thrust: + -   Pelvic Compression Test  + -   Palpation  + -   Pelvic Gapping  + -   Gaenslen s Test  + -   Sacral Thrust (SI) mild -   Injections: 2 with no noted long term relief  Physical therapy: Only guided with Chiropractor  Any other treatments (accupuncture, biofeedback, manipulation, etc.): Chiropractor as noted  Lifestyle changes due to condition: Gave up running, other ADLs           Imaging:   We ordered and independently reviewed new radiographs at this clinic visit. The results were discussed with the patient.  Findings include:    March 13, 2019 lumbar MRI study moderate diffuse lumbar spondylosis with no severe central or foraminal stenosis at any visualized level.    AP and lateral flexion-extension lumbar radiographs show moderate diffuse degenerative lumbar  changes with a mild coronal plane deformity with some lateral listhesis of L3 on L4 and L1 on L2         Assessment and Plan:   Assessment:  58 year old female with severe right sacroiliac dysfunction.  Grossly positive sacroiliac stress tests.  Her past response to injections is not clear because she really cannot remember what happened in 2017.     Plan:  1. Patient has concerns with insurance changing over at the beginning of next month and large deductibles.  We suggested that she proceed with a CT-guided right-sided sacroiliac injection and keep a pain diary, tracking her reaction to the injection from the first minutes and hours through until she is seen in follow-up for evaluation.  We did discuss minimally invasive sacroiliac joint fusion with her she was given info on the procedure.  We will meet with her shortly after she gets the injection to discuss her reaction to that.  She was given a referral for formal physical therapy and a handout describing SI fusion.  She was grateful for the time spent in information obtained.  She will call with questions or concerns.  We will follow-up after her injection has been obtained.    Attending MD (Dr. Mac Urbina) :  I reviewed and verified the history and physical exam of the patient and discussed the patient's management with the other clinical providers involved in this patient's care including any involved residents or Physician Assistant. I reviewed the above note and agree with the documented findings and plan of care, which were communicated to the patient.      Mac Urbina MD    Respectfully,  Nikolay Agustin PA-C  Spine Surgery  [facility]

## 2019-03-18 ENCOUNTER — HOSPITAL ENCOUNTER (OUTPATIENT)
Dept: CT IMAGING | Facility: CLINIC | Age: 59
Discharge: HOME | End: 2019-03-18
Attending: ORTHOPAEDIC SURGERY | Admitting: ORTHOPAEDIC SURGERY
Payer: COMMERCIAL

## 2019-03-18 DIAGNOSIS — M53.3 CHRONIC RIGHT SI JOINT PAIN: ICD-10-CM

## 2019-03-18 DIAGNOSIS — G89.29 CHRONIC RIGHT SI JOINT PAIN: ICD-10-CM

## 2019-03-18 PROCEDURE — 25000125 ZZHC RX 250: Performed by: ORTHOPAEDIC SURGERY

## 2019-03-18 PROCEDURE — 27210258 CT SACROILIAC THERAPEUTIC JOINT INJECTION

## 2019-03-18 PROCEDURE — 25000128 H RX IP 250 OP 636: Performed by: ORTHOPAEDIC SURGERY

## 2019-03-18 RX ORDER — BUPIVACAINE HYDROCHLORIDE 2.5 MG/ML
10 INJECTION, SOLUTION INFILTRATION; PERINEURAL ONCE
Status: COMPLETED | OUTPATIENT
Start: 2019-03-18 | End: 2019-03-18

## 2019-03-18 RX ORDER — TRIAMCINOLONE ACETONIDE 40 MG/ML
40 INJECTION, SUSPENSION INTRA-ARTICULAR; INTRAMUSCULAR ONCE
Status: COMPLETED | OUTPATIENT
Start: 2019-03-18 | End: 2019-03-18

## 2019-03-18 RX ADMIN — TRIAMCINOLONE ACETONIDE 40 MG: 40 INJECTION, SUSPENSION INTRA-ARTICULAR; INTRAMUSCULAR at 13:43

## 2019-03-18 RX ADMIN — BUPIVACAINE HYDROCHLORIDE 1 MG: 2.5 INJECTION, SOLUTION EPIDURAL; INFILTRATION; INTRACAUDAL; PERINEURAL at 13:42

## 2019-03-18 RX ADMIN — LIDOCAINE HYDROCHLORIDE 5 ML: 10 INJECTION, SOLUTION EPIDURAL; INFILTRATION; INTRACAUDAL; PERINEURAL at 13:43

## 2019-03-18 NOTE — PROCEDURES
[facility] Brief Procedure Note    Pre-operative diagnosis: Right sacroiliac joint pain   Post-operative diagnosis Right sacroiliac joint pain   Procedure: CT-guided right sacroiliac joint injection   Surgeon: Saba Akins MD   Assistants(s): -   Estimated blood loss: None    Specimens: None   Findings: Uneventful CT-guided injection of 1 cc bupivacaine mixed with 1 cc Kenalog steroid (40 mg dose) -total 2 cc volume - into right sacroiliac joint.  Pain score prior to procedure: 7/10, pain score after procedure: 4/10.   Complications: None.

## 2019-03-18 NOTE — PROGRESS NOTES
"Pt had CT-guided right SI joint injection.  Pain prior to procedure a \"7\" on 0-10 scale. Pain decreased to a \"4\" post-procedure.  Pt ambulating per her norm.  Discharge instructions discussed with pt, she verbalizes knowledge.  Discharged to home with a .    "

## 2019-03-19 ENCOUNTER — TELEPHONE (OUTPATIENT)
Dept: ORTHOPEDICS | Facility: CLINIC | Age: 59
End: 2019-03-19

## 2019-03-19 ENCOUNTER — OFFICE VISIT (OUTPATIENT)
Dept: ORTHOPEDICS | Facility: CLINIC | Age: 59
End: 2019-03-19
Payer: COMMERCIAL

## 2019-03-19 VITALS — HEIGHT: 65 IN | WEIGHT: 168 LBS | BODY MASS INDEX: 27.99 KG/M2

## 2019-03-19 DIAGNOSIS — M53.3 SI (SACROILIAC) JOINT DYSFUNCTION: Primary | ICD-10-CM

## 2019-03-19 ASSESSMENT — ENCOUNTER SYMPTOMS
BACK PAIN: 1
VOMITING: 0
ABDOMINAL PAIN: 0
ARTHRALGIAS: 1
DIARRHEA: 0
JOINT SWELLING: 0
BOWEL INCONTINENCE: 0
STIFFNESS: 1
JAUNDICE: 0
MUSCLE CRAMPS: 0
BLOATING: 0
NECK PAIN: 1
RECTAL PAIN: 1
HEARTBURN: 0
MYALGIAS: 1
CONSTIPATION: 1
MUSCLE WEAKNESS: 1
NAUSEA: 0
BLOOD IN STOOL: 0

## 2019-03-19 ASSESSMENT — MIFFLIN-ST. JEOR: SCORE: 1342.92

## 2019-03-19 NOTE — NURSING NOTE
"Reason For Visit:   Chief Complaint   Patient presents with     RECHECK     follow up after injection. right SI joint pain        Ht 1.651 m (5' 5\")   Wt 76.2 kg (168 lb)   BMI 27.96 kg/m           Tae Canseco ATC  "

## 2019-03-19 NOTE — TELEPHONE ENCOUNTER
Spoke with pt per  if she would like anything prescribed to help with bravo for the EMG or anything else we could do for her. Pt said no she will suck it up. She is just worried because she had an EMG done on her legs and had a lot of pain with it. She is wondering if the test is absolutely necessary  to diagnose. If it is she will get it.

## 2019-03-19 NOTE — TELEPHONE ENCOUNTER
"----- Message from Josh Vicente MD sent at 3/19/2019  4:47 PM CDT -----  Regarding: FW: pt with anxiety  Can you call this patient and ask if she would like something to take for her nerves before the test or if there's anything else we can do that would help her with it?      ----- Message -----  From: Behling, Kristy  Sent: 3/19/2019   7:37 AM  To: Josh Vicente MD  Subject: pt with anxiety                                  Jim Vicente,   This pt called the EMG lab due to \"extreme anxiety\" about her upcoming test on Thursday, since she had a previous \"bad experience.\" We do not administer any medicine here at the EMG lab, but If you would like to prescribe something for her to take prior to the test to take the edge off her nerves, it would not inhibit our test. She must be awake, alert and able to cooperate for the procedure.   Feel free to call us if you have questions or refer her to her primary care physicians for this request if you are not comfortable managing.   Thank you for the referral.   Barb- EMG tech  [phone]    "

## 2019-03-19 NOTE — PROGRESS NOTES
Spine Surgery Return Clinic Visit      Chief Complaint:   RECHECK (follow up after injection. right SI joint pain )      Interval HPI:  Symptom Profile Including: location of symptoms, onset, severity, exacerbating/alleviating factors, previous treatments:        Kristal Lester is a 58 year old female who presents today in follow-up of right-sided sacroiliac joint injection.  She has been keeping a careful pain diary since the injection 24 hours ago.  She had not done this previously and could not report on immediate post injection status.  She reports pain listing 7/10 when she went in for injection, at 10 minutes 4/10 at 2 hours 3/10 and at 24 hours 2-3/10.  She states she is able to do many more of her required activities of daily living including dressing her children doing laundry and getting dressed.  She states that she does not feel as stiff, and she was able to sleep solidly through the night without waking up with sacroiliac pain.  She knows she has issues with her cervical spine and her lumbar spine at L4-5 but these pale in comparison with the impact that this sacroiliac pain has on her daily activities.      Prior history from 3/15/19 note:  Kristal Lester is a 58 year old female who resents today for evaluation of right-sided sacroiliac joint pain.  She also has pain in her neck and low back but her major concern today is sacroiliac joint pain.  This is worst when she sits down for greater than 15 minutes she then has difficulty with standing and beginning movements once again.  Once she is up and moving pain is generally tolerable.  She feels that something misplaces as she transitions from a chair to standing, she gets a jolt that she would rate her 10/10 for pain and causes her to move very cautiously for several seconds to a minute until pain subsides.  This happens about every other day.  She has been seeing a chiropractor for about 6 months.  This does help for short-term relief after  sessions.  She does do some guided exercises there.  She stopped running as an exercise because of exacerbations of pain.  She will be awoken and night with pain if she rolls over she will get a jolting pain that happens 2-3 times per night and disrupts her sleep.  She does walk with a limp in the right leg in the mornings until she loosens up a bit.  She has had treatments with [facility] she states 2 injections to her right-sided SI joint, fluoroscopy guided, neither which gave her long-term relief.  She is not certain if they gave her short-term relief or not and she really cannot recall.  One was at [facility] and the other at a vein clinic.  She cannot make a statement as to short-term relief.  One injection also to right-sided L4-5, a transforaminal and right-sided trochanteric bursa.  None of these were helpful.          Past Medical History:   No past medical history on file.         Past Surgical History:   No past surgical history on file.         Social History:     Social History     Tobacco Use     Smoking status: Never Smoker   Substance Use Topics     Alcohol use: Not on file            Family History:     Family History   Problem Relation Age of Onset     Chronic Obstructive Pulmonary Disease Father      Alzheimer Disease Maternal Grandmother      Parkinsonism Maternal Grandfather             Allergies:     Allergies   Allergen Reactions     Bees Shortness Of Breath     Cellulitis of the leg            Medications:     Current Outpatient Medications   Medication     EPINEPHrine (EPIPEN/ADRENACLICK/OR ANY BX GENERIC EQUIV) 0.3 MG/0.3ML injection 2-pack     estradiol (VIVELLE-DOT) 0.05 MG/24HR bi-weekly patch     progesterone (PROMETRIUM) 100 MG capsule     No current facility-administered medications for this visit.              Review of Systems:   A focused musculoskeletal and neurologic ROS was performed with pertinent positives and negatives noted in the HPI.  Additional systems were also  "reviewed and are documented at the bottom of the note.         Physical Exam:   Vitals: Ht 1.651 m (5' 5\")   Wt 76.2 kg (168 lb)   BMI 27.96 kg/m    Musculoskeletal, Neurologic, and Spine:   Patient appears comfortable sitting in the exam room.  She sits in a balanced manner not moving her weight laterally.  She is in no acute distress.  Transitional moves are performed with light accessory use of the arms.  She stands with a normal erect posture.  All movements are a little more smooth than at her prior visit 4 days ago.  Condition: degenerative sacroiliitis/disruption  Time: 2 years  SI provocation tests:    Right Left   Carmina Finger Test  + +   VANESA  + -   Thigh Thrust: + +   Pelvic Compression Test  - +   Palpation  + +   Pelvic Gapping  + -   Gaenslen s Test  + -   Sacral Thrust (SI) - -   Injections: 3 with  relief clearly noted at her most recent injection.  Prior injection she was not instructed to keep a pain diary and does not remember the short-term benefits of lidocaine/Marcaine for the diagnostic portion.  Physical therapy: She has performed physical therapy in the past.  She is scheduled to restart in 2 days.    Prior exam from 3/15/19:  Patient is a 58-year-old female in no acute distress sitting in the exam room.  She is alert and oriented x3.  She stands with a normal erect posture.  Transitional moves are performed with medium accessory use of the arms.  She does ambulate with an antalgic gait favoring the right side.  She is able to stand on her heels and toes.  She can forward flex to about 3 hands breadth to the floor.  She can extend about 30 degrees, this elicits low back pain she can lean 30 degrees and rotate 45 degrees bilaterally without difficulty.  Squat is well done.  Sensation is intact and equal to medial and lateral calves and feet and anterior thighs.  Strength is 5/5 bilaterally throughout for hamstrings, quads, great toe plantar dorsiflexion, EHL anterior tib, gastrocsoleus, " hip flexors.  Nerve tension signs are negative bilaterally.  She is minimally tender to palpation at the lower lumbar paraspinal musculature but exquisitely so at right-sided proximal sacroiliac joint.  Minimally tender to trochanters bilaterally.  She has no clonus.  DTRs: 2+ at bilateral knees 2+ at bilateral Achilles.     Condition: degenerative sacroiliitis/disruption  Time: 5 years/months  SI provocation tests:     Right Left   Carmina Finger Test  + -   VANESA  + -   Thigh Thrust: + -   Pelvic Compression Test  + -   Palpation  + -   Pelvic Gapping  + -   Gaenslen s Test  + -   Sacral Thrust (SI) mild -              Imaging:   No new imaging was obtained today.     March 13, 2019 lumbar MRI study moderate diffuse lumbar spondylosis with no severe central or foraminal stenosis at any visualized level.     AP and lateral flexion-extension lumbar radiographs 3/15/19 show moderate diffuse degenerative lumbar changes with a mild coronal plane deformity with some lateral listhesis of L3 on L4 and L1 on L2   Assessment and Plan:     58 year old female with right-sided sacroiliac joint pain.  She had epidural steroid injection about 24 hours ago and has achieved significant relief in this short timeframe.  She is much more functional and able to perform her required activities of daily living.  She states that if she could have even this much relief from the fusion surgery she would gladly schedule right now.  We discussed minimally invasive sacroiliac joint fusions discussed risks and benefits.  She was given an addendum to her physical therapy note to include hip abductor strengthening and crutch training.  She has some issues with changing insurance as of April 1 and we will apply for approval of right sacroiliac joint fusion after April 1.  Patient is in agreement with this plan.  She is grateful for the time spent in information obtained.  She will call with questions or concerns as she awaits the proper timeframe.      She will need a pelvic CT scan to help us plan trajectories for the implants as well as AP, Lateal, Inlet, Outlet views of the pelvis.       Attending MD (Dr. Mac Urbina) :  I reviewed and verified the history and physical exam of the patient and discussed the patient's management with the other clinical providers involved in this patient's care including any involved residents or Physician Assistant. I reviewed the above note and agree with the documented findings and plan of care, which were communicated to the patient.      Mac Urbina MD        Respectfully,  Nikolay Agustin PA-C  Spine Surgery  [facility]    Answers for HPI/ROS submitted by the patient on 3/19/2019   General Symptoms: No  Skin Symptoms: No  HENT Symptoms: No  EYE SYMPTOMS: No  HEART SYMPTOMS: No  LUNG SYMPTOMS: No  INTESTINAL SYMPTOMS: Yes  URINARY SYMPTOMS: No  GYNECOLOGIC SYMPTOMS: No  BREAST SYMPTOMS: No  SKELETAL SYMPTOMS: Yes  BLOOD SYMPTOMS: No  NERVOUS SYSTEM SYMPTOMS: No  MENTAL HEALTH SYMPTOMS: No  Heart burn or indigestion: No  Nausea: No  Vomiting: No  Abdominal pain: No  Bloating: No  Constipation: Yes  Diarrhea: No  Blood in stool: No  Black stools: No  Rectal or Anal pain: Yes  Fecal incontinence: No  Yellowing of skin or eyes: No  Vomit with blood: No  Change in stools: No  Back pain: Yes  Muscle aches: Yes  Neck pain: Yes  Swollen joints: No  Joint pain: Yes  Bone pain: Yes  Muscle cramps: No  Muscle weakness: Yes  Joint stiffness: Yes  Bone fracture: Yes

## 2019-03-19 NOTE — LETTER
RE: Kristal Lester  [address]  [place] [address]     Dear Colleague,    Thank you for referring your patient, Kristal Lester, to the [facility]. Please see a copy of my visit note below.    Spine Surgery Return Clinic Visit      Chief Complaint:   RECHECK (follow up after injection. right SI joint pain )      Interval HPI:  Symptom Profile Including: location of symptoms, onset, severity, exacerbating/alleviating factors, previous treatments:        Kristal Lester is a 58 year old female who presents today in follow-up of right-sided sacroiliac joint injection.  She has been keeping a careful pain diary since the injection 24 hours ago.  She had not done this previously and could not report on immediate post injection status.  She reports pain listing 7/10 when she went in for injection, at 10 minutes 4/10 at 2 hours 3/10 and at 24 hours 2-3/10.  She states she is able to do many more of her required activities of daily living including dressing her children doing laundry and getting dressed.  She states that she does not feel as stiff, and she was able to sleep solidly through the night without waking up with sacroiliac pain.  She knows she has issues with her cervical spine and her lumbar spine at L4-5 but these pale in comparison with the impact that this sacroiliac pain has on her daily activities.      Prior history from 3/15/19 note:  Kristal Lester is a 58 year old female who resents today for evaluation of right-sided sacroiliac joint pain.  She also has pain in her neck and low back but her major concern today is sacroiliac joint pain.  This is worst when she sits down for greater than 15 minutes she then has difficulty with standing and beginning movements once again.  Once she is up and moving pain is generally tolerable.  She feels that something misplaces as she transitions from a chair to standing, she gets a jolt that  she would rate her 10/10 for pain and causes her to move very cautiously for several seconds to a minute until pain subsides.  This happens about every other day.  She has been seeing a chiropractor for about 6 months.  This does help for short-term relief after sessions.  She does do some guided exercises there.  She stopped running as an exercise because of exacerbations of pain.  She will be awoken and night with pain if she rolls over she will get a jolting pain that happens 2-3 times per night and disrupts her sleep.  She does walk with a limp in the right leg in the mornings until she loosens up a bit.  She has had treatments with [facility] she states 2 injections to her right-sided SI joint, fluoroscopy guided, neither which gave her long-term relief.  She is not certain if they gave her short-term relief or not and she really cannot recall.  One was at [facility] and the other at a vein clinic.  She cannot make a statement as to short-term relief.  One injection also to right-sided L4-5, a transforaminal and right-sided trochanteric bursa.  None of these were helpful.          Past Medical History:   No past medical history on file.         Past Surgical History:   No past surgical history on file.         Social History:     Social History     Tobacco Use     Smoking status: Never Smoker   Substance Use Topics     Alcohol use: Not on file            Family History:     Family History   Problem Relation Age of Onset     Chronic Obstructive Pulmonary Disease Father      Alzheimer Disease Maternal Grandmother      Parkinsonism Maternal Grandfather             Allergies:     Allergies   Allergen Reactions     Bees Shortness Of Breath     Cellulitis of the leg            Medications:     Current Outpatient Medications   Medication     EPINEPHrine (EPIPEN/ADRENACLICK/OR ANY BX GENERIC EQUIV) 0.3 MG/0.3ML injection 2-pack     estradiol (VIVELLE-DOT) 0.05 MG/24HR bi-weekly patch     progesterone (PROMETRIUM)  "100 MG capsule     No current facility-administered medications for this visit.           Physical Exam:   Vitals: Ht 1.651 m (5' 5\")   Wt 76.2 kg (168 lb)   BMI 27.96 kg/m     Musculoskeletal, Neurologic, and Spine:   Patient appears comfortable sitting in the exam room.  She sits in a balanced manner not moving her weight laterally.  She is in no acute distress.  Transitional moves are performed with light accessory use of the arms.  She stands with a normal erect posture.  All movements are a little more smooth than at her prior visit 4 days ago.  Condition: degenerative sacroiliitis/disruption  Time: 2 years  SI provocation tests:    Right Left   Carmina Finger Test  + +   VANESA  + -   Thigh Thrust: + +   Pelvic Compression Test  - +   Palpation  + +   Pelvic Gapping  + -   Gaenslen s Test  + -   Sacral Thrust (SI) - -   Injections: 3 with  relief clearly noted at her most recent injection.  Prior injection she was not instructed to keep a pain diary and does not remember the short-term benefits of lidocaine/Marcaine for the diagnostic portion.  Physical therapy: She has performed physical therapy in the past.  She is scheduled to restart in 2 days.    Prior exam from 3/15/19:  Patient is a 58-year-old female in no acute distress sitting in the exam room.  She is alert and oriented x3.  She stands with a normal erect posture.  Transitional moves are performed with medium accessory use of the arms.  She does ambulate with an antalgic gait favoring the right side.  She is able to stand on her heels and toes.  She can forward flex to about 3 hands breadth to the floor.  She can extend about 30 degrees, this elicits low back pain she can lean 30 degrees and rotate 45 degrees bilaterally without difficulty.  Squat is well done.  Sensation is intact and equal to medial and lateral calves and feet and anterior thighs.  Strength is 5/5 bilaterally throughout for hamstrings, quads, great toe plantar dorsiflexion, EHL " anterior tib, gastrocsoleus, hip flexors.  Nerve tension signs are negative bilaterally.  She is minimally tender to palpation at the lower lumbar paraspinal musculature but exquisitely so at right-sided proximal sacroiliac joint.  Minimally tender to trochanters bilaterally.  She has no clonus.  DTRs: 2+ at bilateral knees 2+ at bilateral Achilles.     Condition: degenerative sacroiliitis/disruption  Time: 5 years/months  SI provocation tests:     Right Left   Carmina Finger Test  + -   VANESA  + -   Thigh Thrust: + -   Pelvic Compression Test  + -   Palpation  + -   Pelvic Gapping  + -   Gaenslen s Test  + -   Sacral Thrust (SI) mild -          Imaging:   No new imaging was obtained today.     March 13, 2019 lumbar MRI study moderate diffuse lumbar spondylosis with no severe central or foraminal stenosis at any visualized level.     AP and lateral flexion-extension lumbar radiographs 3/15/19 show moderate diffuse degenerative lumbar changes with a mild coronal plane deformity with some lateral listhesis of L3 on L4 and L1 on L2   Assessment and Plan:     58 year old female with right-sided sacroiliac joint pain.  She had epidural steroid injection about 24 hours ago and has achieved significant relief in this short timeframe.  She is much more functional and able to perform her required activities of daily living.  She states that if she could have even this much relief from the fusion surgery she would gladly schedule right now.  We discussed minimally invasive sacroiliac joint fusions discussed risks and benefits.  She was given an addendum to her physical therapy note to include hip abductor strengthening and crutch training.  She has some issues with changing insurance as of April 1 and we will apply for approval of right sacroiliac joint fusion after April 1.  Patient is in agreement with this plan.  She is grateful for the time spent in information obtained.  She will call with questions or concerns as she  awaits the proper timeframe.     She will need a pelvic CT scan to help us plan trajectories for the implants as well as AP, Lateal, Inlet, Outlet views of the pelvis.       Attending MD (Dr. Mac Urbina) :  I reviewed and verified the history and physical exam of the patient and discussed the patient's management with the other clinical providers involved in this patient's care including any involved residents or Physician Assistant. I reviewed the above note and agree with the documented findings and plan of care, which were communicated to the patient.      Again, thank you for allowing me to participate in the care of your patient.      Sincerely,    Mac Urbina MD

## 2019-03-20 DIAGNOSIS — M53.3 CHRONIC SI JOINT PAIN: ICD-10-CM

## 2019-03-20 DIAGNOSIS — G89.29 CHRONIC SI JOINT PAIN: ICD-10-CM

## 2019-03-20 DIAGNOSIS — G90.519 CRPS (COMPLEX REGIONAL PAIN SYNDROME), UPPER LIMB: Primary | ICD-10-CM

## 2019-03-21 ENCOUNTER — OFFICE VISIT (OUTPATIENT)
Dept: NEUROLOGY | Facility: CLINIC | Age: 59
End: 2019-03-21
Attending: ORTHOPAEDIC SURGERY
Payer: COMMERCIAL

## 2019-03-21 ENCOUNTER — THERAPY VISIT (OUTPATIENT)
Dept: PHYSICAL THERAPY | Facility: CLINIC | Age: 59
End: 2019-03-21
Attending: ORTHOPAEDIC SURGERY
Payer: COMMERCIAL

## 2019-03-21 ENCOUNTER — ANCILLARY PROCEDURE (OUTPATIENT)
Dept: CT IMAGING | Facility: CLINIC | Age: 59
End: 2019-03-21
Attending: ORTHOPAEDIC SURGERY
Payer: COMMERCIAL

## 2019-03-21 DIAGNOSIS — G89.29 CHRONIC SI JOINT PAIN: ICD-10-CM

## 2019-03-21 DIAGNOSIS — M53.3 SACROILIAC JOINT PAIN: ICD-10-CM

## 2019-03-21 DIAGNOSIS — M53.3 CHRONIC SI JOINT PAIN: ICD-10-CM

## 2019-03-21 DIAGNOSIS — G89.29 CHRONIC RIGHT SI JOINT PAIN: ICD-10-CM

## 2019-03-21 DIAGNOSIS — M25.532 LEFT WRIST PAIN: ICD-10-CM

## 2019-03-21 DIAGNOSIS — M53.3 CHRONIC RIGHT SI JOINT PAIN: ICD-10-CM

## 2019-03-21 PROCEDURE — 97116 GAIT TRAINING THERAPY: CPT | Mod: GP | Performed by: PHYSICAL THERAPIST

## 2019-03-21 PROCEDURE — 97110 THERAPEUTIC EXERCISES: CPT | Mod: GP | Performed by: PHYSICAL THERAPIST

## 2019-03-21 PROCEDURE — 97162 PT EVAL MOD COMPLEX 30 MIN: CPT | Mod: GP | Performed by: PHYSICAL THERAPIST

## 2019-03-21 NOTE — PROGRESS NOTES
[facility]  Electrodiagnostic Laboratory    Nerve Conduction & EMG Report          Patient: Kristal Lester  YOB: 1960  Patient ID: [id number]  Age: 58 Years 8 Months  Gender: Female      Referring Physician: Josh Vicente MD    History & Examination:    58 year old woman with pain at her left wrist and fingers. Query carpal tunnel syndrome. Examination of the left hand showed intact strength of APB, FPL, FDP, finger extensors, FDI and ADM.     Techniques:    Sensory and motor conduction studies were done with surface recording electrodes.     Results:    Left median, ulnar, and radial antidromic sensory NCSs were normal. Left median and ulnar orthodromic mixed NCSs done with stimulation at the palm were normal. Left median and ulnar motor NCSs were normal. Left ulnar F-waves were normal. Needle EMG examination was deferred for two reasons: 1) Not necessary to answer referral question, and no weakness of left hand muscles on exam, and 2) Patient's severe discomfort with previous needle EMG study.     Interpretation:    Normal study. No electrodiagnostic evidence of left carpal tunnel syndrome or ulnar neuropathy.     EMG Physician:    Hood Alvares MD            Sensory NCS      Nerve / Sites Rec. Site Onset Peak Ref. NP Amp Ref. PP Amp Dist Brent Ref. Temp     ms ms ms  V  V  V cm m/s m/s  C   L MEDIAN - Dig II Anti      Wrist Dig II 2.55 3.39  24.5 10.0 42.3 14 54.9 48.0 32   L ULNAR - Dig V Anti      Wrist Dig V 2.34 3.13  33.5 8.0 61.0 12.5 53.3 48.0 32.1   L RADIAL - Snuff      Forearm Snuff 1.88 2.40  32.6 15.0 44.0 12.5 66.7 48.0 32.2   L MEDIAN - Ulnar - Palmar      Median Wrist 1.46 2.08 2.40 49.8  63.8 8 54.9  29.6      Ulnar Wrist 1.46 1.93 2.40 26.1  21.8 8 54.9  29.6       Motor NCS      Nerve / Sites Rec. Site Lat Ref. Amp Ref. Rel Amp Dist Brent Ref. Dur. Area Temp.     ms ms mV mV % cm m/s m/s ms %  C   L MEDIAN - APB      Wrist APB 3.44 4.40 10.9 5.0 100 8   6.77  100 32.2      Elbow APB 7.14  10.2  93.6 24 64.9 48.0 6.51 83.2 32.2   L ULNAR - ADM      Wrist ADM 2.76 3.50 8.0 5.0 100 8   6.04 100 32.1      B.Elbow ADM 6.04  8.0  99.7 19 57.9 48.0 6.41 110 32      A.Elbow ADM 7.45  7.6  94.3 8 56.9 48.0 6.09 99.9 32       F  Wave      Nerve Min F Lat Max F Lat Mean FLat Temp.    ms ms ms  C   L ULNAR 25.47 26.51 26.04 31.7

## 2019-03-21 NOTE — PROGRESS NOTES
[facility] Initial Evaluation  Subjective:                                     General health as reported by patient is good.          Current occupation is .    Primary job tasks include:  Prolonged sitting and other (Computer Work).                                Objective:  System    Physical Exam    General     ROS    Assessment/Plan:

## 2019-03-21 NOTE — PROGRESS NOTES
[facility] Initial Evaluation  Subjective:  Mrs. Lester is an active 58 year old  for [employer], and mother of a five year old son. She began noting right SI symptoms in 2016. She relates some of this to her pregnancy. Treatment of chiropractic and former injections were not successful. She had a CT guided right SI injection on 3/18/19, which has been beneficial. She is being scheduled for a fusion within the next 2-4 weeks. Kristal reports 3/10 pain today. She brought her post op protocol. Time was spent on core strength exercises, and gait training with crutches. Kristal will bring her own crutches in for her next visit. She is very motivated to follow her surgeon's orders and protocol. She plans to be off of work for 4-6 weeks.      The history is provided by the patient. No  was used.   Kristal Lester is a 58 year old female with a sacroiliac condition.  Condition occurred with:  Insidious onset.  Condition occurred: at home (may be related to pregnancy at 53 years old).  This is a chronic condition  Started in 2016.    Patient reports pain:  SI joint right.    Quality: better since getting her injectio on 3/18/19. and is constant and reported as 3/10.   Pain is worse during the day.  Symptoms are exacerbated by certain positions and standing (sit to stand) Relieved by: injection.  Pain course: temporary improvement.    Previous treatment includes chiropractic.  There was no improvement following previous treatment.  General health as reported by patient is good.                  Barriers include:  None as reported by the patient.    Red flags:  None as reported by the patient.                        Objective:    Gait:    Gait Type:  Normal   Weight Bearing Status:  WBAT                    Lumbar/SI Evaluation  ROM:  Arom wnl lumbar: Due to the plan for surgery, and the recent SI injection on 3/18/19, trunk and SI ROM and strength were not  evaluated. The patient has been sent to PT for preop exercise and gait training with crutches.      Lumbar Myotomes:  not assessed              Cord Signs:  not assessed    Lumbar Dermtomes:  not assessed                Neural Tension/Mobility:  Lumbar:  Not assessed        Lumbar Palpation:  not assessed                                                                                              Musculoskeletal:        Back:        ROS    Assessment/Plan:    Patient is a 58 year old female with SI complaints.    Patient has the following significant findings with corresponding treatment plan.                Diagnosis 1:  Chronic right SI pain  Pain -  self management, education and home program  Decreased strength - therapeutic exercise and home program  Impaired gait - gait training, assistive devices and home program  Impaired muscle performance - neuro re-education and home program  Decreased function - home program    Therapy Evaluation Codes:   1) History comprised of:   Personal factors that impact the plan of care:      None.    Comorbidity factors that impact the plan of care are:      None.     Medications impacting care: None.  2) Examination of Body Systems comprised of:   Body structures and functions that impact the plan of care:      Sacral illiac joint.   Activity limitations that impact the plan of care are:      Bending, Cooking, Driving, Dressing, Lifting, Sitting, Squatting/kneeling, Stairs, Standing, Walking, Working and Sleeping.  3) Clinical presentation characteristics are:   Evolving/Changing.  4) Decision-Making    Moderate complexity using standardized patient assessment instrument and/or measureable assessment of functional outcome.  Cumulative Therapy Evaluation is: Moderate complexity.    Previous and current functional limitations:  (See Goal Flow Sheet for this information)    Short term and Long term goals: (See Goal Flow Sheet for this information)     Communication ability:   Patient appears to be able to clearly communicate and understand verbal and written communication and follow directions correctly.  Treatment Explanation - The following has been discussed with the patient:   RX ordered/plan of care  Anticipated outcomes  Possible risks and side effects  This patient would benefit from PT intervention to resume normal activities.   Rehab potential is good.    Frequency:  1 X week, once daily  Duration:  for 3 weeks - prior to surgery  Discharge Plan:  Achieve all LTG.  Independent in home treatment program.  Reach maximal therapeutic benefit.    Please refer to the daily flowsheet for treatment today, total treatment time and time spent performing 1:1 timed codes.

## 2019-03-21 NOTE — LETTER
3/21/2019       RE: Kristal Lester  [address]  [facility] [address]     Dear Colleague,    Thank you for referring your patient, Kristal Lester, to the [facility] EMG at [facility]. Please see a copy of my visit note below.        [facility]  Electrodiagnostic Laboratory    Nerve Conduction & EMG Report          Patient: Kristal Lester  YOB: 1960  Patient ID: [id number]  Age: 58 Years 8 Months  Gender: Female      Referring Physician: Josh Vicente MD    History & Examination:    58 year old woman with pain at her left wrist and fingers. Query carpal tunnel syndrome. Examination of the left hand showed intact strength of APB, FPL, FDP, finger extensors, FDI and ADM.     Techniques:    Sensory and motor conduction studies were done with surface recording electrodes.     Results:    Left median, ulnar, and radial antidromic sensory NCSs were normal. Left median and ulnar orthodromic mixed NCSs done with stimulation at the palm were normal. Left median and ulnar motor NCSs were normal. Left ulnar F-waves were normal. Needle EMG examination was deferred for two reasons: 1) Not necessary to answer referral question, and no weakness of left hand muscles on exam, and 2) Patient's severe discomfort with previous needle EMG study.     Interpretation:    Normal study. No electrodiagnostic evidence of left carpal tunnel syndrome or ulnar neuropathy.     EMG Physician:    Hood Alvares MD     Sensory NCS      Nerve / Sites Rec. Site Onset Peak Ref. NP Amp Ref. PP Amp Dist Brent Ref. Temp     ms ms ms  V  V  V cm m/s m/s  C   L MEDIAN - Dig II Anti      Wrist Dig II 2.55 3.39  24.5 10.0 42.3 14 54.9 48.0 32   L ULNAR - Dig V Anti      Wrist Dig V 2.34 3.13  33.5 8.0 61.0 12.5 53.3 48.0 32.1   L RADIAL - Snuff      Forearm Snuff 1.88 2.40  32.6 15.0 44.0 12.5 66.7 48.0 32.2   L MEDIAN - Ulnar - Palmar      Median Wrist 1.46 2.08 2.40 49.8  63.8 8  54.9  29.6      Ulnar Wrist 1.46 1.93 2.40 26.1  21.8 8 54.9  29.6       Motor NCS      Nerve / Sites Rec. Site Lat Ref. Amp Ref. Rel Amp Dist Brent Ref. Dur. Area Temp.     ms ms mV mV % cm m/s m/s ms %  C   L MEDIAN - APB      Wrist APB 3.44 4.40 10.9 5.0 100 8   6.77 100 32.2      Elbow APB 7.14  10.2  93.6 24 64.9 48.0 6.51 83.2 32.2   L ULNAR - ADM      Wrist ADM 2.76 3.50 8.0 5.0 100 8   6.04 100 32.1      B.Elbow ADM 6.04  8.0  99.7 19 57.9 48.0 6.41 110 32      A.Elbow ADM 7.45  7.6  94.3 8 56.9 48.0 6.09 99.9 32       F  Wave      Nerve Min F Lat Max F Lat Mean FLat Temp.    ms ms ms  C   L ULNAR 25.47 26.51 26.04 31.7                       Again, thank you for allowing me to participate in the care of your patient.      Sincerely,    Hood Alvares MD

## 2019-03-26 ENCOUNTER — THERAPY VISIT (OUTPATIENT)
Dept: PHYSICAL THERAPY | Facility: CLINIC | Age: 59
End: 2019-03-26
Attending: ORTHOPAEDIC SURGERY
Payer: COMMERCIAL

## 2019-03-26 DIAGNOSIS — M53.3 SACROILIAC JOINT PAIN: ICD-10-CM

## 2019-03-26 PROCEDURE — 97112 NEUROMUSCULAR REEDUCATION: CPT | Mod: GP | Performed by: PHYSICAL THERAPIST

## 2019-03-26 PROCEDURE — 97116 GAIT TRAINING THERAPY: CPT | Mod: GP | Performed by: PHYSICAL THERAPIST

## 2019-03-26 PROCEDURE — 97110 THERAPEUTIC EXERCISES: CPT | Mod: GP | Performed by: PHYSICAL THERAPIST

## 2019-03-29 ENCOUNTER — TELEPHONE (OUTPATIENT)
Dept: ORTHOPEDICS | Facility: CLINIC | Age: 59
End: 2019-03-29

## 2019-03-29 NOTE — TELEPHONE ENCOUNTER
reached out to patient per her [employer] message regarding questions about insurance coverage for surgery with Dr. Urbina. All questions were answered, and informed patient that once I received a PA I will reach out to patient to schedule surgery

## 2019-04-02 ENCOUNTER — THERAPY VISIT (OUTPATIENT)
Dept: PHYSICAL THERAPY | Facility: CLINIC | Age: 59
End: 2019-04-02
Attending: ORTHOPAEDIC SURGERY
Payer: COMMERCIAL

## 2019-04-02 DIAGNOSIS — M53.3 SACROILIAC JOINT PAIN: ICD-10-CM

## 2019-04-02 PROCEDURE — 97110 THERAPEUTIC EXERCISES: CPT | Mod: GP | Performed by: PHYSICAL THERAPIST

## 2019-04-02 PROCEDURE — 97112 NEUROMUSCULAR REEDUCATION: CPT | Mod: GP | Performed by: PHYSICAL THERAPIST

## 2019-04-02 NOTE — PROGRESS NOTES
Subjective:  HPI                    Objective:  System    Physical Exam    General     ROS    Assessment/Plan:    DISCHARGE REPORT    Progress reporting period is from 3/21/19 to 4/2/19.     SUBJECTIVE  Subjective: Kristal returns with increased pain. Has been attempting to perform HEP, however often noticing increased pain. Having difficulty with prolonged standing and any movement of legs. Pt needing assistance from  for transitional movements. Pt having pain extend into R groin at times. Encouraged pt to continue practicing with crutches at home, particularly on stairs to be prepared for post op.    Current Pain level: 5/10   Initial Pain level: 9/10   Changes in function: No changes noted in function since last SOAP note   Adverse reactions: None;   ,         OBJECTIVE  Objective: Lumbar AROM: flexion to knee slight pain R, extension 50% +pain R, R SB 50% +pain R, L SB 75% -pain, R rotation 50% +pain R, L rotation 75% +pain R      ASSESSMENT/PLAN  Updated problem list and treatment plan: Diagnosis 1:  R SI joint dysfunction  Pain -  hot/cold therapy, manual therapy, self management and education  Decreased ROM/flexibility - manual therapy, therapeutic exercise and home program  Decreased strength - therapeutic exercise, therapeutic activities and home program  Impaired muscle performance - neuro re-education and home program  Decreased function - therapeutic activities and home program  Impaired posture - neuro re-education, therapeutic activities and home program  STG/LTGs have been met or progress has been made towards goals:  Yes (See Goal flow sheet completed today.)  Assessment of Progress: The patient's condition is unchanged.  Self Management Plans:  Patient has been instructed in a home treatment program.  Patient  has been instructed in self management of symptoms.  I have re-evaluated this patient and find that the nature, scope, duration and intensity of the therapy is appropriate for the  medical condition of the patient.  Kristal continues to require the following intervention to meet STG and LTG's: PT intervention is no longer required to meet STG/LTG.  We will discharge this patient from PT.    Recommendations:  This patient would benefit from further evaluation.    Please refer to the daily flowsheet for treatment today, total treatment time and time spent performing 1:1 timed codes.

## 2019-04-09 DIAGNOSIS — M25.839 ULNOCARPAL IMPINGEMENT SYNDROME: Primary | ICD-10-CM

## 2019-04-10 ENCOUNTER — OFFICE VISIT (OUTPATIENT)
Dept: ORTHOPEDICS | Facility: CLINIC | Age: 59
End: 2019-04-10
Payer: COMMERCIAL

## 2019-04-10 ENCOUNTER — ANCILLARY PROCEDURE (OUTPATIENT)
Dept: GENERAL RADIOLOGY | Facility: CLINIC | Age: 59
End: 2019-04-10
Attending: ORTHOPAEDIC SURGERY
Payer: COMMERCIAL

## 2019-04-10 DIAGNOSIS — M25.839 ULNOCARPAL IMPINGEMENT SYNDROME: ICD-10-CM

## 2019-04-10 DIAGNOSIS — M25.531 RIGHT WRIST PAIN: Primary | ICD-10-CM

## 2019-04-10 RX ORDER — BETAMETHASONE SODIUM PHOSPHATE AND BETAMETHASONE ACETATE 3; 3 MG/ML; MG/ML
6 INJECTION, SUSPENSION INTRA-ARTICULAR; INTRALESIONAL; INTRAMUSCULAR; SOFT TISSUE
Status: DISCONTINUED | OUTPATIENT
Start: 2019-04-10 | End: 2019-08-06

## 2019-04-10 RX ORDER — LIDOCAINE HYDROCHLORIDE 10 MG/ML
1 INJECTION, SOLUTION EPIDURAL; INFILTRATION; INTRACAUDAL; PERINEURAL
Status: DISCONTINUED | OUTPATIENT
Start: 2019-04-10 | End: 2019-08-06

## 2019-04-10 RX ADMIN — BETAMETHASONE SODIUM PHOSPHATE AND BETAMETHASONE ACETATE 6 MG: 3; 3 INJECTION, SUSPENSION INTRA-ARTICULAR; INTRALESIONAL; INTRAMUSCULAR; SOFT TISSUE at 15:46

## 2019-04-10 RX ADMIN — LIDOCAINE HYDROCHLORIDE 1 ML: 10 INJECTION, SOLUTION EPIDURAL; INFILTRATION; INTRACAUDAL; PERINEURAL at 15:46

## 2019-04-10 NOTE — NURSING NOTE
Reason For Visit:   Chief Complaint   Patient presents with     Left Wrist - Follow Up     Follow Up     Ulnocarpal impingement syndrome       Primary MD: No Ref-Primary, Physician  Ref. MD: Est    ?  No    Age: 58 year old          There were no vitals taken for this visit.      Pain Assessment  Patient Currently in Pain: Yes  0-10 Pain Scale: 4  Primary Pain Location: Wrist               QuickDASH Assessment  No flowsheet data found.       Allergies   Allergen Reactions     Bees Shortness Of Breath     Cellulitis of the leg       Mynor Case, CMA

## 2019-04-10 NOTE — PROGRESS NOTES
[facility]  Orthopedics  Josh Vicente MD  04/10/2019     Name: Kristal Lester  MRN: [id number]  Age: 58 year old  : 1960  Referring provider: Santiago Coyne     Chief Complaint: Follow Up of the Left Wrist and Follow Up (Ulnocarpal impingement syndrome)     History of Present Illness:   Kristal Lester is a 58 year old, right handed female who presents today for follow-up regarding left wrist MRI and EMG results. The patient is still experiencing pain in the radial aspect of the left wrist and dorsal aspect of hand (wrist>>hand). She does not have much pain in the ulnar aspect of the wrist. Her pain was exacerbated yesterday when she was opening a bag of frozen hamburgers. She has discomfort with gripping objects in her left hand that occasionally causes severe sharp pain with certain movements that are hard to characterize. She reports having had a CSI into the left wrist that did not give any relief and in fact just briefly made things worse. She voices no further concerns at this time.     Review of Systems:   A 10-point review of systems was obtained and is negative except for as noted in the HPI.     Physical Examination:  General: Healthy appearing female. Affect appropriate. Normal gait. Alert and oriented to surroundings.   Left Upper Extremity:   Skin c/d/i  No deformity  Full symmetric ROM arc  Tenderness over first dorsal compartment. Non-tender over the thumb CMC joint. No pain with CMC grind test. Positive Finkelstein's test. Tenderness over the DRUJ and fovea on one occasion but not reproducible. Non-tender over ECU. DRUJ is stable. Pain with ulnocarpal impugnment over the ulnar, central and radial wrist. NO TTP ECU tendon. No ECU subluxation.     Imaging:  MR imaging of the left wrist (3/9/2019)  1. Suspected full-thickness, partial width tear of triangular  fibrocartilage proper at the radial attachment and opposing cystic  change and edema of lunate. Although these findings may  be seen in a  setting of ulnar abutment syndrome, no associated ulnar positive  variance is noted.  2. Suspect partial thickness tear of the scapholunate interosseous  ligament dorsal component.    EMG results (3/21/2019):  Left median, ulnar, and radial antidromic sensory NCSs were normal. Left median and ulnar orthodromic mixed NCSs done with stimulation at the palm were normal. Left median and ulnar motor NCSs were normal. Left ulnar F-waves were normal. Needle EMG examination was deferred for two reasons: 1) Not necessary to answer referral question, and no weakness of left hand muscles on exam, and 2) Patient's severe discomfort with previous needle EMG study.      I have independently reviewed the above imaging studies; the results were discussed with the patient.     Assessment:   58 year old, right handed female with left wrist pain. X-ray showed a lunate cyst which raised concern for ulnocarpal impingement. However, she is not ulnar positive, including with pronated  views obtained today, and her pain is more radial and central than ulnar. She does have a possible small radial sided TFCC tear but this does not correlate well with her symptoms. Today it actually seems to me that her pain is more consistent with DeQuervain's tendonitis at this point.     Plan:     Reviewed the imaging and EMG results with the patient in detail.     Her exam findings are not consistent with UC impaction at this point    Patient received a left sided DeQuervain's injection in clinic. This may be both diagnostic and therapeutic.     She will attend to her symptoms and will follow-up if this does not result in relief or if symptoms resolve and then recur.     I will also have her see hand therapy for splint and eval.          Procedure note  After informed consent was obtained the left wrist was prepped and 1 ml of 1% lidocaine and 6 mg betamethasone were injected into the first dorsal compartment the patient tolerated this  well.     Josh Vicente MD          Scribe Disclosure:   I, Logan Kristin, am serving as a scribe to document services personally performed by Josh Vicente MD at this visit, based upon the provider's statements to me. All documentation has been reviewed by the aforementioned provider prior to being entered into the official medical record.

## 2019-04-10 NOTE — PROGRESS NOTES
Hand / Upper Extremity Injection/Arthrocentesis: R extensor compartment 1  Date/Time: 4/10/2019 3:46 PM  Performed by: Josh Vicente MD  Authorized by: Josh Vicente MD     Indications:  Pain  Needle Size:  27 G  Guidance: landmark    Condition: de Quervain's                          Site:  R extensor compartment 1              Medications:  6 mg betamethasone acet & sod phos 6 (3-3) MG/ML; 1 mL lidocaine (PF) 1 %  Outcome:  Tolerated well, no immediate complications  Procedure discussed: discussed risks, benefits, and alternatives    Consent Given by:  Patient  Timeout: timeout called immediately prior to procedure    Prep: patient was prepped and draped in usual sterile fashion            [facility] ORTHOPAEDIC [place] [id number] [address] [address]  [phone]  Dept: [phone]  ______________________________________________________________________________    Patient: Kristal Lester   : 1960   MRN: [id number]   April 10, 2019    INVASIVE PROCEDURE SAFETY CHECKLIST    Date: 4/10/2019   Procedure: ashley Carlos's  Patient Name: Kristal Lester  MRN: [id number]  YOB: 1960    Action: Complete sections as appropriate. Any discrepancy results in a HARD COPY until resolved.     PRE PROCEDURE:  Patient ID verified with 2 identifiers (name and  or MRN): Yes  Procedure and site verified with patient/designee (when able): Yes  Accurate consent documentation in medical record: Yes  H&P (or appropriate assessment) documented in medical record: Yes  H&P must be up to 20 days prior to procedure and updates within 24 hours of procedure as applicable: Yes  Relevant diagnostic and radiology test results appropriately labeled and displayed as applicable: Yes  Procedure site(s) marked with provider initials: Yes    TIMEOUT:  Time-Out performed immediately prior to starting procedure, including verbal and active participation of all team members addressing the  following:Yes  * Correct patient identify  * Confirmed that the correct side and site are marked  * An accurate procedure consent form  * Agreement on the procedure to be done  * Correct patient position  * Relevant images and results are properly labeled and appropriately displayed  * The need to administer antibiotics or fluids for irrigation purposes during the procedure as applicable   * Safety precautions based on patient history or medication use    DURING PROCEDURE: Verification of correct person, site, and procedures any time the responsibility for care of the patient is transferred to another member of the care team.       The following medications were given:     MEDICATION:  Lidocaine without epinephrine  ROUTE: IM  SITE: Right hand  DOSE: 10mg  LOT #: [id number]  : [employer]  EXPIRATION DATE: 06/01/2022  NDC#: 39255-010-24   Was there drug waste? Yes  Amount of drug waste (mL): 29ml.  Reason for waste:  Single use vial    MEDICATION:  Celestone 30 mg  ROUTE: IM  SITE: Right hand  DOSE: 6mg  LOT #: [id number]  : [employer]  EXPIRATION DATE: 04/11/2020  NDC#: 4423-8780-73  Was there drug waste? Yes  Amount of drug waste (mL): 29ml.  Reason for waste:  Single use vial      Mynor Case CMA  April 10, 2019

## 2019-04-10 NOTE — LETTER
4/10/2019       RE: Kristal Lester  [address]  [facility] [address]     Dear Colleague,    Thank you for referring your patient, Kristal Lester, to the [facility] Please see a copy of my visit note below.    [facility]  Orthopedics  Josh Vicente MD  04/10/2019     Name: Kristal Lester  MRN: [id number]  Age: 58 year old  : 1960  Referring provider: Santiago Coyne     Chief Complaint: Follow Up of the Left Wrist and Follow Up (Ulnocarpal impingement syndrome)     History of Present Illness:   Kristal Lester is a 58 year old, right handed female who presents today for follow-up regarding left wrist MRI and EMG results. The patient is still experiencing pain in the radial aspect of the left wrist and dorsal aspect of hand (wrist>>hand). She does not have much pain in the ulnar aspect of the wrist. Her pain was exacerbated yesterday when she was opening a bag of frozen hamburgers. She has discomfort with gripping objects in her left hand that occasionally causes severe sharp pain with certain movements that are hard to characterize. She reports having had a CSI into the left wrist that did not give any relief and in fact just briefly made things worse. She voices no further concerns at this time.     Review of Systems:   A 10-point review of systems was obtained and is negative except for as noted in the HPI.     Physical Examination:  General: Healthy appearing female. Affect appropriate. Normal gait. Alert and oriented to surroundings.   Left Upper Extremity:   Skin c/d/i  No deformity  Full symmetric ROM arc  Tenderness over first dorsal compartment. Non-tender over the thumb CMC joint. No pain with CMC grind test. Positive Finkelstein's test. Tenderness over the DRUJ and fovea on one occasion but not reproducible. Non-tender over ECU. DRUJ is stable. Pain with ulnocarpal impugnment over the ulnar, central and radial  wrist. NO TTP ECU tendon. No ECU subluxation.     Imaging:  MR imaging of the left wrist (3/9/2019)  1. Suspected full-thickness, partial width tear of triangular  fibrocartilage proper at the radial attachment and opposing cystic  change and edema of lunate. Although these findings may be seen in a  setting of ulnar abutment syndrome, no associated ulnar positive  variance is noted.  2. Suspect partial thickness tear of the scapholunate interosseous  ligament dorsal component.    EMG results (3/21/2019):  Left median, ulnar, and radial antidromic sensory NCSs were normal. Left median and ulnar orthodromic mixed NCSs done with stimulation at the palm were normal. Left median and ulnar motor NCSs were normal. Left ulnar F-waves were normal. Needle EMG examination was deferred for two reasons: 1) Not necessary to answer referral question, and no weakness of left hand muscles on exam, and 2) Patient's severe discomfort with previous needle EMG study.      I have independently reviewed the above imaging studies; the results were discussed with the patient.     Assessment:   58 year old, right handed female with left wrist pain. X-ray showed a lunate cyst which raised concern for ulnocarpal impingement. However, she is not ulnar positive, including with pronated  views obtained today, and her pain is more radial and central than ulnar. She does have a possible small radial sided TFCC tear but this does not correlate well with her symptoms. Today it actually seems to me that her pain is more consistent with DeQuervain's tendonitis at this point.     Plan:     Reviewed the imaging and EMG results with the patient in detail.     Her exam findings are not consistent with UC impaction at this point    Patient received a left sided DeQuervain's injection in clinic. This may be both diagnostic and therapeutic.     She will attend to her symptoms and will follow-up if this does not result in relief or if symptoms resolve and  then recur.     I will also have her see hand therapy for splint and eval.          Procedure note  After informed consent was obtained the left wrist was prepped and 1 ml of 1% lidocaine and 6 mg betamethasone were injected into the first dorsal compartment the patient tolerated this well.     Josh Vicente MD          Scribe Disclosure:   I, Logan Foster, am serving as a scribe to document services personally performed by Josh Vicente MD at this visit, based upon the provider's statements to me. All documentation has been reviewed by the aforementioned provider prior to being entered into the official medical record.     Hand / Upper Extremity Injection/Arthrocentesis: R extensor compartment 1  Date/Time: 4/10/2019 3:46 PM  Performed by: Josh Vicente MD  Authorized by: Josh Vicente MD     Indications:  Pain  Needle Size:  27 G  Guidance: landmark    Condition: de Quervain's                          Site:  R extensor compartment 1              Medications:  6 mg betamethasone acet & sod phos 6 (3-3) MG/ML; 1 mL lidocaine (PF) 1 %  Outcome:  Tolerated well, no immediate complications  Procedure discussed: discussed risks, benefits, and alternatives    Consent Given by:  Patient  Timeout: timeout called immediately prior to procedure    Prep: patient was prepped and draped in usual sterile fashion            [facility]  [address] [place] [id number]  [phone]  Dept: [phone]  ______________________________________________________________________________    Patient: Kristal Lester   : 1960   MRN: [id number]   April 10, 2019    INVASIVE PROCEDURE SAFETY CHECKLIST    Date: 4/10/2019   Procedure: de Quervain's  Patient Name: Kristal Lester  MRN: [id number]  YOB: 1960    Action: Complete sections as appropriate. Any discrepancy results in a HARD COPY until resolved.     PRE PROCEDURE:  Patient ID verified with 2  identifiers (name and  or MRN): Yes  Procedure and site verified with patient/designee (when able): Yes  Accurate consent documentation in medical record: Yes  H&P (or appropriate assessment) documented in medical record: Yes  H&P must be up to 20 days prior to procedure and updates within 24 hours of procedure as applicable: Yes  Relevant diagnostic and radiology test results appropriately labeled and displayed as applicable: Yes  Procedure site(s) marked with provider initials: Yes    TIMEOUT:  Time-Out performed immediately prior to starting procedure, including verbal and active participation of all team members addressing the following:Yes  * Correct patient identify  * Confirmed that the correct side and site are marked  * An accurate procedure consent form  * Agreement on the procedure to be done  * Correct patient position  * Relevant images and results are properly labeled and appropriately displayed  * The need to administer antibiotics or fluids for irrigation purposes during the procedure as applicable   * Safety precautions based on patient history or medication use    DURING PROCEDURE: Verification of correct person, site, and procedures any time the responsibility for care of the patient is transferred to another member of the care team.       The following medications were given:     MEDICATION:  Lidocaine without epinephrine  ROUTE: IM  SITE: Right hand  DOSE: 10mg  LOT #: [id number]  : [employer]  EXPIRATION DATE: 2022  NDC#: 06102-288-33   Was there drug waste? Yes  Amount of drug waste (mL): 29ml.  Reason for waste:  Single use vial    MEDICATION:  Celestone 30 mg  ROUTE: IM  SITE: Right hand  DOSE: 6mg  LOT #: [id number]  : [employer]  EXPIRATION DATE: 2020  NDC#: 1409-9932-47  Was there drug waste? Yes  Amount of drug waste (mL): 29ml.  Reason for waste:  Single use vial      Mynor Case CMA  April 10, 2019      Again, thank you for  allowing me to participate in the care of your patient.      Sincerely,    Josh Vicente MD

## 2019-05-17 PROBLEM — M53.3 SACROILIAC JOINT PAIN: Status: RESOLVED | Noted: 2019-03-21 | Resolved: 2019-05-17

## 2019-07-10 ENCOUNTER — THERAPY VISIT (OUTPATIENT)
Dept: PHYSICAL THERAPY | Facility: CLINIC | Age: 59
End: 2019-07-10
Payer: COMMERCIAL

## 2019-07-10 DIAGNOSIS — H81.12 BENIGN PAROXYSMAL POSITIONAL VERTIGO, LEFT: ICD-10-CM

## 2019-07-10 PROCEDURE — 97112 NEUROMUSCULAR REEDUCATION: CPT | Mod: GP | Performed by: PHYSICAL THERAPIST

## 2019-07-10 PROCEDURE — 97161 PT EVAL LOW COMPLEX 20 MIN: CPT | Mod: GP | Performed by: PHYSICAL THERAPIST
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
## 2019-07-11 ENCOUNTER — TELEPHONE (OUTPATIENT)
Dept: ORTHOPEDICS | Facility: CLINIC | Age: 59
End: 2019-07-11

## 2019-07-11 NOTE — TELEPHONE ENCOUNTER
Patient is scheduled for surgery with Dr. Urbina     Spoke or left message with: patient via phone call     Date of Surgery: 8/22/19     Location: [facility]     Informed patient they will need an adult  yes    Post-op made 6 wk with provider     Pre-op with surgeon (if applicable): yes 8/6/19    H&P: Scheduled with PAC 8/6/19     Additional imaging/appointments: n/a     Surgery packet: given in clinic     Additional comments: n/a

## 2019-07-11 NOTE — TELEPHONE ENCOUNTER
Called patient to schedule surgery with Dr. Urbina for a SI joint fusion.     Patient had questions regarding how long she would be out of work for. If it is more than 7 days she must fill for short term disability through her work.     I told patient that I would have Dr. Carlitos Hinkle's nurse reach out to her to discuss her options.     We are holding a surgery date of 8/22 for the patient.

## 2019-07-22 NOTE — TELEPHONE ENCOUNTER
"Action 7/22/2019 10:41am  PP   Action Taken Called pt and her PCP is Dr. LLOYD ([facility]). Previously seen by Jael Thomas. Had heart testing but does not remember where. Emailed pt my contact info. No PFT or sleep study.     Action 7/23/2019 12:40pm  PP   Action Taken Pt emailed: \"The cardiologist name is Esteban nieto at [facility], [address] [place] [address] [phone] fax [phone].\"  Emailed [facility] release form to patient.  Faxed request for recs to [facility].       FUTURE VISIT INFORMATION      SURGERY INFORMATION:    Date: 8/22/2019    Location: [facility] OR    Surgeon:  Dr. Mac Urbina    Anesthesia Type:  General    RECORDS REQUESTED FROM:       [facility]    Pertinent Medical History:    Most recent EKG+ Tracing:     Most recent ECHO:     Most recent Cardiac Stress Test:    Most recent Coronary Angiogram:       "

## 2019-08-06 ENCOUNTER — OFFICE VISIT (OUTPATIENT)
Dept: SURGERY | Facility: CLINIC | Age: 59
End: 2019-08-06
Payer: COMMERCIAL

## 2019-08-06 ENCOUNTER — OFFICE VISIT (OUTPATIENT)
Dept: ORTHOPEDICS | Facility: CLINIC | Age: 59
End: 2019-08-06
Payer: COMMERCIAL

## 2019-08-06 ENCOUNTER — PRE VISIT (OUTPATIENT)
Dept: SURGERY | Facility: CLINIC | Age: 59
End: 2019-08-06

## 2019-08-06 ENCOUNTER — ANESTHESIA EVENT (OUTPATIENT)
Dept: SURGERY | Facility: CLINIC | Age: 59
End: 2019-08-06

## 2019-08-06 VITALS
DIASTOLIC BLOOD PRESSURE: 89 MMHG | HEART RATE: 71 BPM | WEIGHT: 170.9 LBS | TEMPERATURE: 97.7 F | BODY MASS INDEX: 28.47 KG/M2 | HEIGHT: 65 IN | RESPIRATION RATE: 16 BRPM | SYSTOLIC BLOOD PRESSURE: 135 MMHG | OXYGEN SATURATION: 96 %

## 2019-08-06 DIAGNOSIS — M53.3 SI (SACROILIAC) JOINT DYSFUNCTION: Primary | ICD-10-CM

## 2019-08-06 DIAGNOSIS — Z01.818 PREOP EXAMINATION: Primary | ICD-10-CM

## 2019-08-06 RX ORDER — SENNOSIDES 8.6 MG
650 CAPSULE ORAL PRN
COMMUNITY
End: 2021-04-02

## 2019-08-06 RX ORDER — CHOLECALCIFEROL (VITAMIN D3) 50 MCG
1 TABLET ORAL EVERY MORNING
COMMUNITY

## 2019-08-06 RX ORDER — ROSUVASTATIN CALCIUM 10 MG/1
10 TABLET, COATED ORAL AT BEDTIME
COMMUNITY
Start: 2019-08-02

## 2019-08-06 ASSESSMENT — ENCOUNTER SYMPTOMS
VOMITING: 0
STIFFNESS: 1
CONSTIPATION: 1
BLOOD IN STOOL: 0
ARTHRALGIAS: 1
DIARRHEA: 0
HEARTBURN: 0
MUSCLE WEAKNESS: 0
NAUSEA: 0
BACK PAIN: 1
BLOATING: 0
BOWEL INCONTINENCE: 0
ABDOMINAL PAIN: 0
JAUNDICE: 0
NECK PAIN: 1
RECTAL PAIN: 1
MUSCLE CRAMPS: 0
MYALGIAS: 1
JOINT SWELLING: 0

## 2019-08-06 ASSESSMENT — MIFFLIN-ST. JEOR: SCORE: 1351.08

## 2019-08-06 NOTE — H&P
Pre-Operative H & P     CC:  Preoperative exam to assess for increased cardiopulmonary risk while undergoing surgery and anesthesia.    Date of Encounter: 2019  Primary Care Physician:  No Ref-Primary, Physician  Reason for visit: SI (sacroiliac) joint dysfunction [M53.3]  - Primary   HPI  Kristal Lester is a 59 year old female who presents for pre-operative H & P in preparation for Right Minimally Invasive SI Joint Fusion with O-Arm/Stealth with Dr. Urbina on 19 at [facility]. History is obtained from the patient.     Patient who was evaluated by Dr. Urbina for right sided sacroiliac joint pain, worsened when sitting down, and then has difficulty standing and moving again. While moving, the pain is more tolerable. She reports feeling that something displaces as she transitions from a chair to standing about every other day. When this happens, she gets a jolt of severe pain and she has to move cautiously until it subsides. She has attempted conservative measures including chiropractic care and injections without lasting relief. An epidural steroid injection on 3/8/19 provided improvement and she was able to increase her activities of daily living. She returned afterwards to Dr. Urbina. Her imaging was reviewed and she was counseled for above procedure.     Patient's history is otherwise significant for BPPV s/p Eply, and PVCs with negative work up in 2017.        Past Medical History  Past Medical History:   Diagnosis Date     Benign paroxysmal positional vertigo, left 7/10/2019     HLD (hyperlipidemia)      PVC's (premature ventricular contractions)      SI (sacroiliac) joint dysfunction        Past Surgical History  Past Surgical History:   Procedure Laterality Date     Bunion surgery  2017      SECTION  5 years ago, 35 years ago     ELBOW SURGERY Right      MAMMOPLASTY AUGMENTATION  23 years ago       Hx of Blood transfusions/reactions: Denies.       Hx of abnormal bleeding or anti-platelet use: Denies.     Steroid use in the last year: Injection.    Personal or FH with difficulty with Anesthesia:  Denies.     Prior to Admission Medications  Current Outpatient Medications   Medication Sig Dispense Refill     acetaminophen (TYLENOL 8 HOUR) 650 MG CR tablet Take 650 mg by mouth as needed for mild pain or fever       estradiol (VIVELLE-DOT) 0.05 MG/24HR bi-weekly patch Place 1 patch onto the skin twice a week       progesterone (PROMETRIUM) 100 MG capsule Take 100 mg by mouth At Bedtime        vitamin D3 (CHOLECALCIFEROL) 2000 units (50 mcg) tablet Take 1 tablet by mouth At Bedtime       EPINEPHrine (EPIPEN/ADRENACLICK/OR ANY BX GENERIC EQUIV) 0.3 MG/0.3ML injection 2-pack Inject 0.3 mg into the muscle as needed for anaphylaxis       rosuvastatin (CRESTOR) 10 MG tablet Take 10 mg by mouth At Bedtime         Allergies  Allergies   Allergen Reactions     Bees Shortness Of Breath     Cellulitis of the leg       Social History  Social History     Socioeconomic History     Marital status:      Spouse name: Not on file     Number of children: Not on file     Years of education: Not on file     Highest education level: Not on file   Occupational History     Not on file   Social Needs     Financial resource strain: Not on file     Food insecurity:     Worry: Not on file     Inability: Not on file     Transportation needs:     Medical: Not on file     Non-medical: Not on file   Tobacco Use     Smoking status: Never Smoker     Smokeless tobacco: Never Used   Substance and Sexual Activity     Alcohol use: Not Currently     Drug use: Not Currently     Sexual activity: Not on file   Lifestyle     Physical activity:     Days per week: Not on file     Minutes per session: Not on file     Stress: Not on file   Relationships     Social connections:     Talks on phone: Not on file     Gets together: Not on file     Attends Nondenominational service: Not on file     Active member  of club or organization: Not on file     Attends meetings of clubs or organizations: Not on file     Relationship status: Not on file     Intimate partner violence:     Fear of current or ex partner: Not on file     Emotionally abused: Not on file     Physically abused: Not on file     Forced sexual activity: Not on file   Other Topics Concern     Not on file   Social History Narrative     Not on file       Family History  Family History   Problem Relation Age of Onset     Scleroderma Mother      Chronic Obstructive Pulmonary Disease Father      Heart Disease Father      Hyperlipidemia Father      Alzheimer Disease Maternal Grandmother      Parkinsonism Maternal Grandfather      Mental Illness Brother        Review of Systems  ROS/MED HX      The complete review of systems is negative other than noted in the HPI or here.   ENT/Pulmonary:  - neg pulmonary ROS     Neurologic:     (+)other neuro benign paroxysmal positional vertigo    Cardiovascular:  - neg cardiovascular ROS   (+) ----. : . . . :. . Previous cardiac testing date:results:date: results:ECG reviewed date:2017 results:SR with frequent PVCs date: results:          METS/Exercise Tolerance:  3 - Able to walk 1-2 blocks without stopping   Hematologic:  - neg hematologic  ROS       Musculoskeletal:   (+)  other musculoskeletal- SI (sacroiliac) joint dysfunction       GI/Hepatic:  - neg GI/hepatic ROS       Renal/Genitourinary:  - ROS Renal section negative       Endo:  - neg endo ROS       Psychiatric:  - neg psychiatric ROS       Infectious Disease:  - neg infectious disease ROS       Malignancy:      - no malignancy   Other:    (+) C-spine cleared: N/A, H/O Chronic Pain,no other significant disability              PHYSICAL EXAM:   Mental Status/Neuro: A/A/O; Age Appropriate   Airway: Facies: Feasible  Mallampati: I  Mouth/Opening: Full  TM distance: > 6 cm  Neck ROM: Full   Respiratory: Auscultation: CTAB     Resp. Rate: Normal     Resp. Effort: Normal     "  CV: Rhythm: Regular  Heart: Normal Sounds  Edema: None   Comments:        Preop Vitals    BP Readings from Last 3 Encounters:   02/19/19 114/79    Pulse Readings from Last 3 Encounters:   02/19/19 72      Resp Readings from Last 3 Encounters:   02/19/19 13    SpO2 Readings from Last 3 Encounters:   02/19/19 97%      Temp Readings from Last 1 Encounters:   02/19/19 97.9  F (36.6  C)    Ht Readings from Last 1 Encounters:   03/19/19 1.651 m (5' 5\")      Wt Readings from Last 1 Encounters:   03/19/19 76.2 kg (168 lb)    Estimated body mass index is 27.96 kg/m  as calculated from the following:    Height as of 3/19/19: 1.651 m (5' 5\").    Weight as of 3/19/19: 76.2 kg (168 lb).     Temp: 97.7  F (36.5  C) Temp src: Oral BP: 135/89 Pulse: 71   Resp: 16 SpO2: 96 %         170 lbs 14.4 oz  5' 5\"   Body mass index is 28.44 kg/m .       Physical Exam  Constitutional: Awake, alert, cooperative, no apparent distress, and appears stated age.  Eyes: Pupils equal, round and reactive to light, extra ocular muscles intact, sclera clear, conjunctiva normal.  HENT: Normocephalic, oral pharynx with moist mucus membranes, good dentition. No goiter appreciated.   Respiratory: Clear to auscultation bilaterally, no crackles or wheezing. No cough or obvious dyspnea.  Cardiovascular: Regular rate and rhythm, normal S1 and S2, and no murmur noted.  Carotids +2, no bruits. No edema. Palpable pulses to radial  DP and PT arteries.   GI: Normal bowel sounds, soft, non-distended, non-tender, no masses palpated, no hepatosplenomegaly.    Lymph/Hematologic: No cervical lymphadenopathy and no supraclavicular lymphadenopathy.  Genitourinary: Deferred.   Skin: Warm and dry.  No rashes at anticipated surgical site.   Musculoskeletal: Mildly limited ROM of neck. There is no redness, warmth, or swelling of the joints. Gross motor strength is normal.    Neurologic: Awake, alert, oriented to name, place and time. Cranial nerves II-XII are grossly intact. " Gait is mildly irregular due to pain with position changes.   Neuropsychiatric: Calm, cooperative. Normal affect.     Labs: (personally reviewed) OSH 6/19/19   WBC 6.9  Hgb 13.1  Hematocrit 40.8  Platelets 338  Na 140  K 4.4  Cl 101  Glu 89  Cr 0.71  GFR 94  LFTs normal  EKG: Personally reviewed: Sinus rhythm with frequent PVCs    Stress test: Exercise stress echo 3/16/17  Summary:  1. Normal stress echocardiogram with no inducible wall motion abnormalities at stress.   2. No diagnostic ischemic EKG changes during stress.   3. Occasional PVCs with rest extinguished with effort.  4. LV function normal. Visually estimated EF 60-65% at rest.  5. Normal regional wall motion at rest.   6. Normal heart rate and blood pressure response to stress.   7. No pericardial effusion.   8. No anginal symptoms with exercise.  9. Target heart rate achieved.   10. Normal exercise capacity.   11. Oxygen saturation remains normal with stress  Holter monitor 2/24-25/17  Sinus rhythm with 12,651 PVCs, 13% of all beats. No evidence for sustained arrythmias.    CT Pelvis 3/21/19                                                                   IMPRESSION:   1. Right anterior and inferior sacroiliac synovial joint sclerosis on  the iliac side that measures more than 5 mm in thickness and reveals  marginal erosive changes. Less pronounced sclerosis on the sacral side  of the synovial joint. Less pronounced, however similar appearance of  the left sacroiliac joint.  2. Minimal osteophyte formation and possible vacuum phenomenon.  However, no significant osteophytosis or joint space narrowing to  suggest degenerative changes. Imaging findings are nonspecific,  inflammatory sacroiliitis versus degenerative arthritis should be  considered. Recommend clinical correlation for psoriatic arthritis,  inflammatory bowel disease, Shelbie's. No radiographic evidence of  ankylosis to suggest spondyloarthropathy. The sclerosis pattern on  both sides of the  sacroiliac joint also is not typical for osteitis  condensans.  3. Degenerative changes at the L4-L5 vertebral body level and facet  arthropathy.     CT Sacroiliac 3/18/19                                                                   IMPRESSION:   1. Right anterior and inferior sacroiliac synovial joint sclerosis on  the iliac side that measures more than 5 mm in thickness and reveals  marginal erosive changes. Less pronounced sclerosis on the sacral side  of the synovial joint. Less pronounced, however similar appearance of  the left sacroiliac joint.  2. Minimal osteophyte formation and possible vacuum phenomenon.  However, no significant osteophytosis or joint space narrowing to  suggest degenerative changes. Imaging findings are nonspecific,  inflammatory sacroiliitis versus degenerative arthritis should be  considered. Recommend clinical correlation for psoriatic arthritis,  inflammatory bowel disease, Shelbie's. No radiographic evidence of  ankylosis to suggest spondyloarthropathy. The sclerosis pattern on  both sides of the sacroiliac joint also is not typical for osteitis  condensans.  3. Degenerative changes at the L4-L5 vertebral body level and facet  arthropathy.     Xray lumbar spine 3/11/19                                                                Impression:   1. Moderate degenerative change in the lumbar spine, as described,  with associated convex left spinal curvature deformity.  2. No evidence of dynamic instability.    Imaging and cardiac testing reviewed by this provider      Outside records reviewed from: Care Everywhere    ASSESSMENT and PLAN  Kristal Lester is a 59 year old female scheduled to undergo Right Minimally Invasive SI Joint Fusion with O-Arm/Stealth with Dr. Urbina on 8/22/19. She has the following specific operative considerations:   - RCRI : No serious cardiac risks.   - Anesthesia considerations:  Refer to PAC assessment in anesthesia records  - VTE risk: 0.26%  - CANDIDO #  of risks 1/8 = Low risk  - Risk of PONV score = 3.  If > 2, anti-emetic intervention recommended. If 3 or > anti emetic intervention recommended with two or more meds    --SI joint dysfunction with above procedure planned.   --HLD. Crestor at HS. History of PVCs, evaluated by Cardiology in 2017. Negative stress echo and PVCs were extinguished with exertion. Testing above. No other cardiac history, symptoms or meds. Has been runner over time.   --Nonsmoker. Denies pulmonary symptoms.   --BPPV recent exacerbation s/p Eply and resolved.     Arrival time, NPO, shower and medication instructions provided by nursing staff today. Preparing For Your Surgery handout given.      Patient was reviewed by Dr. Ernandez.     MANN Duncan CNS  Preoperative Assessment Center  [facility]  Clinic and Surgery Center  Phone: [phone]  Fax: [phone]

## 2019-08-06 NOTE — NURSING NOTE
Reason For Visit:   Chief Complaint   Patient presents with     RECHECK     PAC review and discuss surgery        There were no vitals taken for this visit.         Tae Canseco ATC

## 2019-08-06 NOTE — PROGRESS NOTES
Spine Surgery Return Clinic Visit      Chief Complaint:   RECHECK (PAC review and discuss surgery )      Interval HPI:  Symptom Profile Including: location of symptoms, onset, severity, exacerbating/alleviating factors, previous treatments:        Kristal Lester is a 58 year old female who presents today in follow-up of right-sided sacroiliac joint dysfunction.  The patient has attempted multiple trials of nonoperative intervention without a clear relief of her symptoms.  Currently severely disabled her leisure and activities of daily living. she has had 3 injections into the right SI joint that have provided almost complete relief of her symptoms, although this was short-lived.  Last injection was in 2019 and the patient's symptoms improved for about 3 weeks, although after words she became symptomatic.      Based on her failure of long-lasting relief of her symptoms with nonoperative interventions the patient was indicated for SI joint fusion.  She met with the PAC clinic and was cleared for surgery.  Today she reaffirms her desire to proceed with surgical intervention.  Reports that her symptoms have worsened since the last time she saw us.    Patient reports that she will develop severe constipation with narcotic use.  Additionally narcotics to produce insomnia as a side effect.         Past Medical History:     Past Medical History:   Diagnosis Date     Benign paroxysmal positional vertigo, left 7/10/2019     HLD (hyperlipidemia)      PVC's (premature ventricular contractions)      SI (sacroiliac) joint dysfunction             Past Surgical History:     Past Surgical History:   Procedure Laterality Date     Bunion surgery  2017      SECTION  5 years ago, 35 years ago     ELBOW SURGERY Right      MAMMOPLASTY AUGMENTATION  23 years ago            Social History:     Social History     Tobacco Use     Smoking status: Never Smoker     Smokeless tobacco: Never Used   Substance Use Topics     Alcohol  use: Not Currently            Family History:     Family History   Problem Relation Age of Onset     Scleroderma Mother      Chronic Obstructive Pulmonary Disease Father      Heart Disease Father      Hyperlipidemia Father      Alzheimer Disease Maternal Grandmother      Parkinsonism Maternal Grandfather      Mental Illness Brother             Allergies:     Allergies   Allergen Reactions     Bees Shortness Of Breath     Cellulitis of the leg            Medications:     Current Outpatient Medications   Medication     acetaminophen (TYLENOL 8 HOUR) 650 MG CR tablet     EPINEPHrine (EPIPEN/ADRENACLICK/OR ANY BX GENERIC EQUIV) 0.3 MG/0.3ML injection 2-pack     estradiol (VIVELLE-DOT) 0.05 MG/24HR bi-weekly patch     progesterone (PROMETRIUM) 100 MG capsule     rosuvastatin (CRESTOR) 10 MG tablet     vitamin D3 (CHOLECALCIFEROL) 2000 units (50 mcg) tablet     No current facility-administered medications for this visit.              Review of Systems:   A focused musculoskeletal and neurologic ROS was performed with pertinent positives and negatives noted in the HPI.  Additional systems were also reviewed and are documented at the bottom of the note.         Physical Exam:   Vitals: There were no vitals taken for this visit.  Musculoskeletal, Neurologic, and Spine:   Patient is exquisitely tender to palpation over the right SI joint.  The patient walks from the chair to the table chilling source the left side to unload her right lower extremity.  No pain over the greater trochanter.  Some pain with movement of the hip joint over the posterior SI joint area, not the anterior groin.     Right Left   Carmina Finger Test  + +   VANESA  + -   Thigh Thrust: + +   Pelvic Compression Test  + +   Palpation  + +   Pelvic Gapping  + -   Gaenslen s Test  + -   Sacral Thrust (SI) - -            Imaging:   No new imaging was obtained today.     March 13, 2019 lumbar MRI study moderate diffuse lumbar spondylosis with no severe central or  foraminal stenosis at any visualized level.    CT of the pelvis obtained on March 2019 was reviewed, shows severe sclerosis of the SI joint, much worse on the right than the left side.     AP and lateral flexion-extension lumbar radiographs 3/15/19 show moderate diffuse degenerative lumbar changes with a mild coronal plane deformity with some lateral listhesis of L3 on L4 and L1 on L2   Assessment and Plan:     58 year old female with right-sided sacroiliac joint pain, resistant to nonoperative interventions.  We had a good discussion with the patient regarding surgical intervention in the form of an SI joint fusion.  At this time based on the failure of nonoperative intervention the next step would be surgical fusion.  We discussed the risk benefits and alternatives to the procedures with the patient, as well as the rehabilitation period.  She reaffirmed her desire to proceed with surgical intervention.    She did mention that whenever she takes narcotics for pain control she developed severe constipation, we agreed that the day of the surgery we would start her on a high dose regimen of stool softeners.  In addition to these she has tried oxycodone and tramadol in the past for pain control, she has insomnia as a side.  We agreed that we would try a different pain control medication.    The patient was seen and discussed with Dr. Urbina who agrees with the treatment plan as stated above.     Adonis Murcia MD  Orthopaedic Surgery, PGY-4      STAFF STATEMENT: I again went over the risks and benefits of surgery with the patient today.  We are planning for sacroiliac joint fusion.  I explained that from the randomized trials about 80% of patients get at least 50% pain relief.  Thus the biggest risk is the risk of incomplete symptom relief.  There is also risk of pin advancement resulting in neurological bowel injury, nonunion which might require revision surgery in the future, wound healing problems and chronic pain  in the area.  There is also about a 20% chance that at some point the other side might need surgical intervention.  She understands and she would like to proceed.  We talked about the postoperative recovery and the need for activity avoidance.  All questions were answered today.  I spent about 20 minutes with the patient essentially all of which was in counseling.    Attending MD (Dr. Mac Urbina) :  I reviewed and verified the history and physical exam of the patient and discussed the patient's management with the other clinical providers involved in this patient's care including any involved residents or physicians assistants. I reviewed the above note and agree with the documented findings and plan of care, which were communicated to the patient.      Mac Urbina MD      Answers for HPI/ROS submitted by the patient on 8/6/2019   General Symptoms: No  Skin Symptoms: No  HENT Symptoms: No  EYE SYMPTOMS: No  HEART SYMPTOMS: No  LUNG SYMPTOMS: No  INTESTINAL SYMPTOMS: Yes  URINARY SYMPTOMS: No  GYNECOLOGIC SYMPTOMS: No  BREAST SYMPTOMS: No  SKELETAL SYMPTOMS: Yes  BLOOD SYMPTOMS: No  NERVOUS SYSTEM SYMPTOMS: No  MENTAL HEALTH SYMPTOMS: No  Heart burn or indigestion: No  Nausea: No  Vomiting: No  Abdominal pain: No  Bloating: No  Constipation: Yes  Diarrhea: No  Blood in stool: No  Black stools: No  Rectal or Anal pain: Yes  Fecal incontinence: No  Yellowing of skin or eyes: No  Vomit with blood: No  Change in stools: No  Back pain: Yes  Muscle aches: Yes  Neck pain: Yes  Swollen joints: No  Joint pain: Yes  Bone pain: No  Muscle cramps: No  Muscle weakness: No  Joint stiffness: Yes  Bone fracture: No

## 2019-08-06 NOTE — PATIENT INSTRUCTIONS
Preparing for Your Surgery      Name:  Kristal Lester   MRN:  [id number]   :  1960   Today's Date:  2019     Arriving for surgery:  Surgery date:  2019  Arrival time:  5:30 am  Please come to:     [facility] [address]  [address] [place]  [address]    - parking is available in front of [facility] from 5:15AM to 8:00PM. If you prefer, park your car in the Green Lot.    -Proceed to the 3rd floor, check in at the Adult Surgery Waiting Lounge. [phone]    If an escort is needed stop at the Information Desk in the lobby. Inform the information person that you are here for surgery. An escort to the Adult Surgery Waiting Lounge will be provided.    What can I eat or drink?  -  You may have solid food or milk products until 8 hours prior to your surgery.(19 at 11 pm)  -  You may have water, apple juice or 7up/Sprite until 2 hours prior to your surgery.(5 am)    Which medicines can I take?                 Stop Aspirin, vitamins and supplements one week prior to surgery.               Hold Ibuprofen for 24 hours and/or Naproxen for 48 hours prior to surgery.     -  Please take these medications the day of surgery:  NONE      How do I prepare myself?  -  Take two showers: one the night before surgery; and one the morning of surgery.         Use Scrubcare or Hibiclens to wash from neck down.  You may use your own shampoo and conditioner. No other hair products.   -  Do NOT use lotion, powder, deodorant, or antiperspirant the day of your surgery.  -  Do NOT wear any makeup, fingernail polish or jewelry.  - Do not bring your own medications to the hospital, except for inhalers and eye drops.  -  Bring your ID and insurance card.      -If you are scheduled to go home the Same Day as surgery you must have a responsible adult as a  and to stay with you overnight the first 24 hours after surgery.     Questions or Concerns:  -If you have  questions or concerns regarding the day of surgery, please call  The Pre Admission Nursing Office at  [phone].       -For questions after surgery please call your surgeons office.

## 2019-08-06 NOTE — ANESTHESIA PREPROCEDURE EVALUATION
Anesthesia Pre-Procedure Evaluation    Patient: Kristal Lester   MRN:     [id number] Gender:   female   Age:    59 year old :      1960        Preoperative Diagnosis: * No surgery found *        Past Medical History:   Diagnosis Date     Benign paroxysmal positional vertigo, left 7/10/2019     PVC's (premature ventricular contractions)      SI (sacroiliac) joint dysfunction       Past Surgical History:   Procedure Laterality Date     Bunion surgery  2017      SECTION            Anesthesia Evaluation     . Pt has had prior anesthetic. Type: General and Regional    No history of anesthetic complications          ROS/MED HX    ENT/Pulmonary:  - neg pulmonary ROS     Neurologic:     (+)other neuro benign paroxysmal positional vertigo s/p Eply     Cardiovascular:     (+) Dyslipidemia, ----. : . . . :. . Previous cardiac testing date:results:Stress Testdate: results:ECG reviewed date: results:SR with frequent PVCs date: results:         (-) taking anticoagulants/antiplatelets   METS/Exercise Tolerance:  >4 METS   Hematologic:  - neg hematologic  ROS       Musculoskeletal:   (+)  other musculoskeletal- SI (sacroiliac) joint dysfunction       GI/Hepatic:  - neg GI/hepatic ROS       Renal/Genitourinary:  - ROS Renal section negative       Endo:  - neg endo ROS       Psychiatric:  - neg psychiatric ROS       Infectious Disease:  - neg infectious disease ROS       Malignancy:      - no malignancy   Other:    (+) C-spine cleared: N/A, H/O Chronic Pain,no other significant disability                        PHYSICAL EXAM:   Mental Status/Neuro: A/A/O; Age Appropriate   Airway: Facies: Feasible  Mallampati: I  Mouth/Opening: Full  TM distance: > 6 cm  Neck ROM: Limited   Respiratory: Auscultation: CTAB     Resp. Rate: Normal     Resp. Effort: Normal      CV: Rhythm: Regular  Heart: Normal Sounds  Edema: None   Comments:      Dental: Retainer  Retainer: Upper                LABS:  CBC: No results found  "for: WBC, HGB, HCT, PLT  BMP: No results found for: NA, POTASSIUM, CHLORIDE, CO2, BUN, CR, GLC  COAGS: No results found for: PTT, INR, FIBR  POC: No results found for: BGM, HCG, HCGS  OTHER: No results found for: PH, LACT, A1C, JOCE, PHOS, MAG, ALBUMIN, PROTTOTAL, ALT, AST, GGT, ALKPHOS, BILITOTAL, BILIDIRECT, LIPASE, AMYLASE, MARRY, TSH, T4, T3, CRP, SED     Preop Vitals    BP Readings from Last 3 Encounters:   02/19/19 114/79    Pulse Readings from Last 3 Encounters:   02/19/19 72      Resp Readings from Last 3 Encounters:   02/19/19 13    SpO2 Readings from Last 3 Encounters:   02/19/19 97%      Temp Readings from Last 1 Encounters:   02/19/19 97.9  F (36.6  C)    Ht Readings from Last 1 Encounters:   03/19/19 1.651 m (5' 5\")      Wt Readings from Last 1 Encounters:   03/19/19 76.2 kg (168 lb)    Estimated body mass index is 27.96 kg/m  as calculated from the following:    Height as of 3/19/19: 1.651 m (5' 5\").    Weight as of 3/19/19: 76.2 kg (168 lb).     LDA:        SAMIA FV AN PLAN NO PONV RULE       PAC Discussion and Assessment    ASA Classification: 2  Case is suitable for: [facility]  Anesthetic techniques and relevant risks discussed: GA  Invasive monitoring and risk discussed: No  Types:   Possibility and Risk of blood transfusion discussed: No  NPO instructions given:   Additional anesthetic preparation and risks discussed:   Needs early admission to pre-op area:   Other:     PAC Resident/NP Anesthesia Assessment:  Kristal Lester is a 59 year old female scheduled to undergo Right Minimally Invasive SI Joint Fusion with O-Arm/Stealth with Dr. Urbina on 8/22/19. She has the following specific operative considerations:   - RCRI : No serious cardiac risks.   - VTE risk: 0.26%  - CANDIDO # of risks 1/8 = Low risk  - Risk of PONV score = 3.  If > 2, anti-emetic intervention recommended. If 3 or > anti emetic intervention recommended with two or more meds    --SI joint dysfunction with above procedure planned. "   --HLD. Crestor at HS. History of PVCs, evaluated by Cardiology in 2017. Negative stress echo and PVCs were extinguished with exertion. Testing above. No other cardiac history, symptoms or meds. Has been runner over time.   --Nonsmoker. Denies pulmonary symptoms.   --BPPV recent exacerbation s/p Eply and resolved.       Patient was reviewed by Dr. Ernandez.       Reviewed and Signed by PAC Mid-Level Provider/Resident  Mid-Level Provider/Resident: MANN Fairchild CNS  Date: 8/6/19  Time: 10:15am    Attending Anesthesiologist Anesthesia Assessment:        Anesthesiologist:   Date:   Time:   Pass/Fail:   Disposition:     PAC Pharmacist Assessment:        Pharmacist:   Date:   Time:    MANN Duncan

## 2019-08-06 NOTE — LETTER
8/6/2019       RE: Kristal Lester  [address]  [place] [address]     Dear Colleague,    Thank you for referring your patient, Kristal Lester, to the [facility]. Please see a copy of my visit note below.    Spine Surgery Return Clinic Visit      Chief Complaint:   RECHECK (PAC review and discuss surgery )      Interval HPI:  Symptom Profile Including: location of symptoms, onset, severity, exacerbating/alleviating factors, previous treatments:        Kristal Lester is a 58 year old female who presents today in follow-up of right-sided sacroiliac joint dysfunction.  The patient has attempted multiple trials of nonoperative intervention without a clear relief of her symptoms.  Currently severely disabled her leisure and activities of daily living. she has had 3 injections into the right SI joint that have provided almost complete relief of her symptoms, although this was short-lived.  Last injection was in March 2019 and the patient's symptoms improved for about 3 weeks, although after words she became symptomatic.      Based on her failure of long-lasting relief of her symptoms with nonoperative interventions the patient was indicated for SI joint fusion.  She met with the PAC clinic and was cleared for surgery.  Today she reaffirms her desire to proceed with surgical intervention.  Reports that her symptoms have worsened since the last time she saw us.    Patient reports that she will develop severe constipation with narcotic use.  Additionally narcotics to produce insomnia as a side effect.         Past Medical History:     Past Medical History:   Diagnosis Date     Benign paroxysmal positional vertigo, left 7/10/2019     HLD (hyperlipidemia)      PVC's (premature ventricular contractions)      SI (sacroiliac) joint dysfunction             Past Surgical History:     Past Surgical History:   Procedure Laterality Date     Bunion surgery   2017      SECTION  5 years ago, 35 years ago     ELBOW SURGERY Right      MAMMOPLASTY AUGMENTATION  23 years ago            Social History:     Social History     Tobacco Use     Smoking status: Never Smoker     Smokeless tobacco: Never Used   Substance Use Topics     Alcohol use: Not Currently            Family History:     Family History   Problem Relation Age of Onset     Scleroderma Mother      Chronic Obstructive Pulmonary Disease Father      Heart Disease Father      Hyperlipidemia Father      Alzheimer Disease Maternal Grandmother      Parkinsonism Maternal Grandfather      Mental Illness Brother             Allergies:     Allergies   Allergen Reactions     Bees Shortness Of Breath     Cellulitis of the leg            Medications:     Current Outpatient Medications   Medication     acetaminophen (TYLENOL 8 HOUR) 650 MG CR tablet     EPINEPHrine (EPIPEN/ADRENACLICK/OR ANY BX GENERIC EQUIV) 0.3 MG/0.3ML injection 2-pack     estradiol (VIVELLE-DOT) 0.05 MG/24HR bi-weekly patch     progesterone (PROMETRIUM) 100 MG capsule     rosuvastatin (CRESTOR) 10 MG tablet     vitamin D3 (CHOLECALCIFEROL) 2000 units (50 mcg) tablet     No current facility-administered medications for this visit.              Review of Systems:   A focused musculoskeletal and neurologic ROS was performed with pertinent positives and negatives noted in the HPI.  Additional systems were also reviewed and are documented at the bottom of the note.         Physical Exam:   Vitals: There were no vitals taken for this visit.  Musculoskeletal, Neurologic, and Spine:   Patient is exquisitely tender to palpation over the right SI joint.  The patient walks from the chair to the table chilling source the left side to unload her right lower extremity.  No pain over the greater trochanter.  Some pain with movement of the hip joint over the posterior SI joint area, not the anterior groin.     Right Left   Carmina Finger Test  + +   VANESA  + -   Thigh  Thrust: + +   Pelvic Compression Test  + +   Palpation  + +   Pelvic Gapping  + -   Gaenslen s Test  + -   Sacral Thrust (SI) - -            Imaging:   No new imaging was obtained today.     March 13, 2019 lumbar MRI study moderate diffuse lumbar spondylosis with no severe central or foraminal stenosis at any visualized level.    CT of the pelvis obtained on March 2019 was reviewed, shows severe sclerosis of the SI joint, much worse on the right than the left side.     AP and lateral flexion-extension lumbar radiographs 3/15/19 show moderate diffuse degenerative lumbar changes with a mild coronal plane deformity with some lateral listhesis of L3 on L4 and L1 on L2     Assessment and Plan:     58 year old female with right-sided sacroiliac joint pain, resistant to nonoperative interventions.  We had a good discussion with the patient regarding surgical intervention in the form of an SI joint fusion.  At this time based on the failure of nonoperative intervention the next step would be surgical fusion.  We discussed the risk benefits and alternatives to the procedures with the patient, as well as the rehabilitation period.  She reaffirmed her desire to proceed with surgical intervention.    She did mention that whenever she takes narcotics for pain control she developed severe constipation, we agreed that the day of the surgery we would start her on a high dose regimen of stool softeners.  In addition to these she has tried oxycodone and tramadol in the past for pain control, she has insomnia as a side.  We agreed that we would try a different pain control medication.    The patient was seen and discussed with Dr. Urbina who agrees with the treatment plan as stated above.     Adonis Murcia MD  Orthopaedic Surgery, PGY-4    STAFF STATEMENT: I again went over the risks and benefits of surgery with the patient today.  We are planning for sacroiliac joint fusion.  I explained that from the randomized trials about 80%  of patients get at least 50% pain relief.  Thus the biggest risk is the risk of incomplete symptom relief.  There is also risk of pin advancement resulting in neurological bowel injury, nonunion which might require revision surgery in the future, wound healing problems and chronic pain in the area.  There is also about a 20% chance that at some point the other side might need surgical intervention.  She understands and she would like to proceed.  We talked about the postoperative recovery and the need for activity avoidance.  All questions were answered today.  I spent about 20 minutes with the patient essentially all of which was in counseling.    Attending MD (Dr. Mac Urbina) :  I reviewed and verified the history and physical exam of the patient and discussed the patient's management with the other clinical providers involved in this patient's care including any involved residents or physicians assistants. I reviewed the above note and agree with the documented findings and plan of care, which were communicated to the patient.      Mac Urbina MD

## 2019-08-20 PROBLEM — M20.10 HALLUX VALGUS WITH BUNIONS: Status: ACTIVE | Noted: 2017-03-29

## 2019-08-20 PROBLEM — M21.619 HALLUX VALGUS WITH BUNIONS: Status: ACTIVE | Noted: 2017-03-29

## 2019-08-21 ENCOUNTER — ANESTHESIA EVENT (OUTPATIENT)
Dept: SURGERY | Facility: CLINIC | Age: 59
End: 2019-08-21
Payer: COMMERCIAL

## 2019-08-21 PROBLEM — H81.12 BENIGN PAROXYSMAL POSITIONAL VERTIGO, LEFT: Status: RESOLVED | Noted: 2019-07-10 | Resolved: 2019-08-21

## 2019-08-21 NOTE — PROGRESS NOTES
Subjective:  HPI                    Objective:  System    Physical Exam    General     ROS    Patient got better and did not return to therapy after initial evaluation.

## 2019-08-22 ENCOUNTER — HOSPITAL ENCOUNTER (OUTPATIENT)
Facility: CLINIC | Age: 59
Discharge: HOME | End: 2019-08-22
Attending: ORTHOPAEDIC SURGERY | Admitting: ORTHOPAEDIC SURGERY
Payer: COMMERCIAL

## 2019-08-22 ENCOUNTER — APPOINTMENT (OUTPATIENT)
Dept: GENERAL RADIOLOGY | Facility: CLINIC | Age: 59
End: 2019-08-22
Attending: ORTHOPAEDIC SURGERY
Payer: COMMERCIAL

## 2019-08-22 ENCOUNTER — ANESTHESIA (OUTPATIENT)
Dept: SURGERY | Facility: CLINIC | Age: 59
End: 2019-08-22
Payer: COMMERCIAL

## 2019-08-22 VITALS
WEIGHT: 169.97 LBS | OXYGEN SATURATION: 97 % | HEIGHT: 65 IN | DIASTOLIC BLOOD PRESSURE: 77 MMHG | TEMPERATURE: 97.5 F | HEART RATE: 83 BPM | RESPIRATION RATE: 18 BRPM | SYSTOLIC BLOOD PRESSURE: 130 MMHG | BODY MASS INDEX: 28.32 KG/M2

## 2019-08-22 DIAGNOSIS — M46.1 SACROILIITIS (H): Primary | ICD-10-CM

## 2019-08-22 LAB
GLUCOSE SERPL-MCNC: 100 MG/DL (ref 70–99)
HCG UR QL: NEGATIVE
HGB BLD-MCNC: 13.9 G/DL (ref 11.7–15.7)

## 2019-08-22 PROCEDURE — 25000125 ZZHC RX 250: Performed by: ORTHOPAEDIC SURGERY

## 2019-08-22 PROCEDURE — 25000125 ZZHC RX 250: Performed by: NURSE ANESTHETIST, CERTIFIED REGISTERED

## 2019-08-22 PROCEDURE — 37000008 ZZH ANESTHESIA TECHNICAL FEE, 1ST 30 MIN: Performed by: ORTHOPAEDIC SURGERY

## 2019-08-22 PROCEDURE — C1713 ANCHOR/SCREW BN/BN,TIS/BN: HCPCS | Performed by: ORTHOPAEDIC SURGERY

## 2019-08-22 PROCEDURE — 25000566 ZZH SEVOFLURANE, EA 15 MIN: Performed by: ORTHOPAEDIC SURGERY

## 2019-08-22 PROCEDURE — 85018 HEMOGLOBIN: CPT | Performed by: ANESTHESIOLOGY

## 2019-08-22 PROCEDURE — 25000128 H RX IP 250 OP 636: Performed by: ANESTHESIOLOGY

## 2019-08-22 PROCEDURE — 40000170 ZZH STATISTIC PRE-PROCEDURE ASSESSMENT II: Performed by: ORTHOPAEDIC SURGERY

## 2019-08-22 PROCEDURE — 36000078 ZZH SURGERY LEVEL 6 W FLUORO 1ST 30 MIN - UMMC: Performed by: ORTHOPAEDIC SURGERY

## 2019-08-22 PROCEDURE — 36415 COLL VENOUS BLD VENIPUNCTURE: CPT | Performed by: ANESTHESIOLOGY

## 2019-08-22 PROCEDURE — 37000009 ZZH ANESTHESIA TECHNICAL FEE, EACH ADDTL 15 MIN: Performed by: ORTHOPAEDIC SURGERY

## 2019-08-22 PROCEDURE — 82947 ASSAY GLUCOSE BLOOD QUANT: CPT | Performed by: ANESTHESIOLOGY

## 2019-08-22 PROCEDURE — 27210794 ZZH OR GENERAL SUPPLY STERILE: Performed by: ORTHOPAEDIC SURGERY

## 2019-08-22 PROCEDURE — 71000014 ZZH RECOVERY PHASE 1 LEVEL 2 FIRST HR: Performed by: ORTHOPAEDIC SURGERY

## 2019-08-22 PROCEDURE — 40000277 XR SURGERY CARM FLUORO LESS THAN 5 MIN W STILLS: Mod: TC

## 2019-08-22 PROCEDURE — 25000128 H RX IP 250 OP 636: Performed by: ORTHOPAEDIC SURGERY

## 2019-08-22 PROCEDURE — 25000128 H RX IP 250 OP 636: Performed by: NURSE ANESTHETIST, CERTIFIED REGISTERED

## 2019-08-22 PROCEDURE — 81025 URINE PREGNANCY TEST: CPT | Performed by: ANESTHESIOLOGY

## 2019-08-22 PROCEDURE — 25800030 ZZH RX IP 258 OP 636: Performed by: ANESTHESIOLOGY

## 2019-08-22 PROCEDURE — 25000128 H RX IP 250 OP 636: Performed by: PHYSICIAN ASSISTANT

## 2019-08-22 PROCEDURE — 25800030 ZZH RX IP 258 OP 636: Performed by: NURSE ANESTHETIST, CERTIFIED REGISTERED

## 2019-08-22 PROCEDURE — 36000076 ZZH SURGERY LEVEL 6 EA 15 ADDTL MIN - UMMC: Performed by: ORTHOPAEDIC SURGERY

## 2019-08-22 PROCEDURE — 25000132 ZZH RX MED GY IP 250 OP 250 PS 637: Performed by: PHYSICIAN ASSISTANT

## 2019-08-22 PROCEDURE — 71000027 ZZH RECOVERY PHASE 2 EACH 15 MINS: Performed by: ORTHOPAEDIC SURGERY

## 2019-08-22 DEVICE — IMPLANTABLE DEVICE
Type: IMPLANTABLE DEVICE | Site: SACRUM | Status: NON-FUNCTIONAL
Removed: 2021-08-11

## 2019-08-22 RX ORDER — MEPERIDINE HYDROCHLORIDE 25 MG/ML
12.5 INJECTION INTRAMUSCULAR; INTRAVENOUS; SUBCUTANEOUS
Status: DISCONTINUED | OUTPATIENT
Start: 2019-08-22 | End: 2019-08-22 | Stop reason: HOSPADM

## 2019-08-22 RX ORDER — OXYCODONE HYDROCHLORIDE 5 MG/1
5 TABLET ORAL
Status: COMPLETED | OUTPATIENT
Start: 2019-08-22 | End: 2019-08-22

## 2019-08-22 RX ORDER — CEFAZOLIN SODIUM 1 G/3ML
1 INJECTION, POWDER, FOR SOLUTION INTRAMUSCULAR; INTRAVENOUS SEE ADMIN INSTRUCTIONS
Status: DISCONTINUED | OUTPATIENT
Start: 2019-08-22 | End: 2019-08-22 | Stop reason: HOSPADM

## 2019-08-22 RX ORDER — BUPIVACAINE HYDROCHLORIDE AND EPINEPHRINE 2.5; 5 MG/ML; UG/ML
INJECTION, SOLUTION INFILTRATION; PERINEURAL PRN
Status: DISCONTINUED | OUTPATIENT
Start: 2019-08-22 | End: 2019-08-22 | Stop reason: HOSPADM

## 2019-08-22 RX ORDER — CEFAZOLIN SODIUM 2 G/100ML
2 INJECTION, SOLUTION INTRAVENOUS
Status: COMPLETED | OUTPATIENT
Start: 2019-08-22 | End: 2019-08-22

## 2019-08-22 RX ORDER — SODIUM CHLORIDE, SODIUM LACTATE, POTASSIUM CHLORIDE, CALCIUM CHLORIDE 600; 310; 30; 20 MG/100ML; MG/100ML; MG/100ML; MG/100ML
INJECTION, SOLUTION INTRAVENOUS CONTINUOUS
Status: DISCONTINUED | OUTPATIENT
Start: 2019-08-22 | End: 2019-08-22 | Stop reason: HOSPADM

## 2019-08-22 RX ORDER — POLYETHYLENE GLYCOL 3350 17 G/17G
1 POWDER, FOR SOLUTION ORAL DAILY
Qty: 10 PACKET | Refills: 0 | Status: SHIPPED | OUTPATIENT
Start: 2019-08-22 | End: 2021-04-02

## 2019-08-22 RX ORDER — ACETAMINOPHEN 325 MG/1
650 TABLET ORAL
Status: DISCONTINUED | OUTPATIENT
Start: 2019-08-22 | End: 2019-08-22 | Stop reason: HOSPADM

## 2019-08-22 RX ORDER — PROPOFOL 10 MG/ML
INJECTION, EMULSION INTRAVENOUS PRN
Status: DISCONTINUED | OUTPATIENT
Start: 2019-08-22 | End: 2019-08-22

## 2019-08-22 RX ORDER — DEXAMETHASONE SODIUM PHOSPHATE 4 MG/ML
INJECTION, SOLUTION INTRA-ARTICULAR; INTRALESIONAL; INTRAMUSCULAR; INTRAVENOUS; SOFT TISSUE PRN
Status: DISCONTINUED | OUTPATIENT
Start: 2019-08-22 | End: 2019-08-22

## 2019-08-22 RX ORDER — ONDANSETRON 2 MG/ML
4 INJECTION INTRAMUSCULAR; INTRAVENOUS EVERY 30 MIN PRN
Status: DISCONTINUED | OUTPATIENT
Start: 2019-08-22 | End: 2019-08-22 | Stop reason: HOSPADM

## 2019-08-22 RX ORDER — GABAPENTIN 300 MG/1
300 CAPSULE ORAL
Status: COMPLETED | OUTPATIENT
Start: 2019-08-22 | End: 2019-08-22

## 2019-08-22 RX ORDER — NALOXONE HYDROCHLORIDE 0.4 MG/ML
.1-.4 INJECTION, SOLUTION INTRAMUSCULAR; INTRAVENOUS; SUBCUTANEOUS
Status: DISCONTINUED | OUTPATIENT
Start: 2019-08-22 | End: 2019-08-22 | Stop reason: HOSPADM

## 2019-08-22 RX ORDER — LIDOCAINE 40 MG/G
CREAM TOPICAL
Status: DISCONTINUED | OUTPATIENT
Start: 2019-08-22 | End: 2019-08-22 | Stop reason: HOSPADM

## 2019-08-22 RX ORDER — ONDANSETRON 4 MG/1
4-8 TABLET, ORALLY DISINTEGRATING ORAL EVERY 8 HOURS PRN
Qty: 4 TABLET | Refills: 0 | Status: SHIPPED | OUTPATIENT
Start: 2019-08-22 | End: 2021-04-02

## 2019-08-22 RX ORDER — HYDROMORPHONE HYDROCHLORIDE 1 MG/ML
.3-.4 INJECTION, SOLUTION INTRAMUSCULAR; INTRAVENOUS; SUBCUTANEOUS EVERY 10 MIN PRN
Status: COMPLETED | OUTPATIENT
Start: 2019-08-22 | End: 2019-08-22

## 2019-08-22 RX ORDER — DOCUSATE SODIUM 100 MG/1
100 CAPSULE, LIQUID FILLED ORAL 2 TIMES DAILY
Qty: 20 CAPSULE | Refills: 0 | Status: SHIPPED | OUTPATIENT
Start: 2019-08-22 | End: 2021-04-02

## 2019-08-22 RX ORDER — KETOROLAC TROMETHAMINE 30 MG/ML
INJECTION, SOLUTION INTRAMUSCULAR; INTRAVENOUS PRN
Status: DISCONTINUED | OUTPATIENT
Start: 2019-08-22 | End: 2019-08-22

## 2019-08-22 RX ORDER — AMOXICILLIN 250 MG
1-2 CAPSULE ORAL 2 TIMES DAILY
Qty: 30 TABLET | Refills: 0 | Status: SHIPPED | OUTPATIENT
Start: 2019-08-22 | End: 2021-04-02

## 2019-08-22 RX ORDER — DIAZEPAM 5 MG
5 TABLET ORAL EVERY 6 HOURS PRN
Qty: 20 TABLET | Refills: 0 | Status: SHIPPED | OUTPATIENT
Start: 2019-08-22 | End: 2021-04-02

## 2019-08-22 RX ORDER — ACETAMINOPHEN 325 MG/1
975 TABLET ORAL ONCE
Status: COMPLETED | OUTPATIENT
Start: 2019-08-22 | End: 2019-08-22

## 2019-08-22 RX ORDER — ONDANSETRON 4 MG/1
4 TABLET, ORALLY DISINTEGRATING ORAL EVERY 30 MIN PRN
Status: DISCONTINUED | OUTPATIENT
Start: 2019-08-22 | End: 2019-08-22 | Stop reason: HOSPADM

## 2019-08-22 RX ORDER — LIDOCAINE HYDROCHLORIDE 20 MG/ML
INJECTION, SOLUTION INFILTRATION; PERINEURAL PRN
Status: DISCONTINUED | OUTPATIENT
Start: 2019-08-22 | End: 2019-08-22

## 2019-08-22 RX ORDER — ONDANSETRON 2 MG/ML
INJECTION INTRAMUSCULAR; INTRAVENOUS PRN
Status: DISCONTINUED | OUTPATIENT
Start: 2019-08-22 | End: 2019-08-22

## 2019-08-22 RX ORDER — HYDROCODONE BITARTRATE AND ACETAMINOPHEN 5; 325 MG/1; MG/1
1-2 TABLET ORAL EVERY 4 HOURS PRN
Qty: 50 TABLET | Refills: 0 | Status: SHIPPED | OUTPATIENT
Start: 2019-08-22 | End: 2021-04-02

## 2019-08-22 RX ORDER — FENTANYL CITRATE 50 UG/ML
INJECTION, SOLUTION INTRAMUSCULAR; INTRAVENOUS PRN
Status: DISCONTINUED | OUTPATIENT
Start: 2019-08-22 | End: 2019-08-22

## 2019-08-22 RX ADMIN — PHENYLEPHRINE HYDROCHLORIDE: 10 INJECTION INTRAVENOUS at 08:04

## 2019-08-22 RX ADMIN — HYDROMORPHONE HYDROCHLORIDE 0.3 MG: 1 INJECTION, SOLUTION INTRAMUSCULAR; INTRAVENOUS; SUBCUTANEOUS at 09:04

## 2019-08-22 RX ADMIN — ONDANSETRON 4 MG: 2 INJECTION INTRAMUSCULAR; INTRAVENOUS at 08:15

## 2019-08-22 RX ADMIN — FENTANYL CITRATE 100 MCG: 50 INJECTION, SOLUTION INTRAMUSCULAR; INTRAVENOUS at 07:04

## 2019-08-22 RX ADMIN — ACETAMINOPHEN 975 MG: 325 TABLET, FILM COATED ORAL at 06:31

## 2019-08-22 RX ADMIN — ONDANSETRON 4 MG: 2 INJECTION INTRAMUSCULAR; INTRAVENOUS at 12:21

## 2019-08-22 RX ADMIN — OXYCODONE HYDROCHLORIDE 5 MG: 5 TABLET ORAL at 09:27

## 2019-08-22 RX ADMIN — FENTANYL CITRATE 50 MCG: 50 INJECTION, SOLUTION INTRAMUSCULAR; INTRAVENOUS at 07:33

## 2019-08-22 RX ADMIN — CEFAZOLIN SODIUM 2 G: 2 INJECTION, SOLUTION INTRAVENOUS at 07:19

## 2019-08-22 RX ADMIN — GABAPENTIN 300 MG: 300 CAPSULE ORAL at 06:31

## 2019-08-22 RX ADMIN — HYDROMORPHONE HYDROCHLORIDE 0.3 MG: 1 INJECTION, SOLUTION INTRAMUSCULAR; INTRAVENOUS; SUBCUTANEOUS at 09:17

## 2019-08-22 RX ADMIN — SODIUM CHLORIDE, POTASSIUM CHLORIDE, SODIUM LACTATE AND CALCIUM CHLORIDE: 600; 310; 30; 20 INJECTION, SOLUTION INTRAVENOUS at 06:57

## 2019-08-22 RX ADMIN — DEXAMETHASONE SODIUM PHOSPHATE 6 MG: 4 INJECTION, SOLUTION INTRAMUSCULAR; INTRAVENOUS at 07:05

## 2019-08-22 RX ADMIN — MIDAZOLAM 2 MG: 1 INJECTION INTRAMUSCULAR; INTRAVENOUS at 06:57

## 2019-08-22 RX ADMIN — PHENYLEPHRINE HYDROCHLORIDE 100 MCG: 10 INJECTION INTRAVENOUS at 07:28

## 2019-08-22 RX ADMIN — PHENYLEPHRINE HYDROCHLORIDE 100 MCG: 10 INJECTION INTRAVENOUS at 07:43

## 2019-08-22 RX ADMIN — PROPOFOL 150 MG: 10 INJECTION, EMULSION INTRAVENOUS at 07:04

## 2019-08-22 RX ADMIN — PHENYLEPHRINE HYDROCHLORIDE 150 MCG: 10 INJECTION INTRAVENOUS at 07:25

## 2019-08-22 RX ADMIN — FENTANYL CITRATE 50 MCG: 50 INJECTION, SOLUTION INTRAMUSCULAR; INTRAVENOUS at 08:38

## 2019-08-22 RX ADMIN — ROCURONIUM BROMIDE 50 MG: 10 INJECTION INTRAVENOUS at 07:05

## 2019-08-22 RX ADMIN — KETOROLAC TROMETHAMINE 30 MG: 30 INJECTION, SOLUTION INTRAMUSCULAR at 08:14

## 2019-08-22 RX ADMIN — LIDOCAINE HYDROCHLORIDE 60 MG: 20 INJECTION, SOLUTION INFILTRATION; PERINEURAL at 07:04

## 2019-08-22 RX ADMIN — SUGAMMADEX 200 MG: 100 INJECTION, SOLUTION INTRAVENOUS at 08:27

## 2019-08-22 RX ADMIN — PHENYLEPHRINE HYDROCHLORIDE 0.4 MCG/KG/MIN: 10 INJECTION INTRAVENOUS at 07:47

## 2019-08-22 ASSESSMENT — MIFFLIN-ST. JEOR: SCORE: 1346.88

## 2019-08-22 NOTE — ANESTHESIA CARE TRANSFER NOTE
Patient: Kristal Lester    Procedure(s):  Right Minimal Invasive Sacral Iliac Joint Fusion    Diagnosis: Right Si Joint Dysfunction  Diagnosis Additional Information: No value filed.    Anesthesia Type:   No value filed.     Note:  Airway :Face Mask  Patient transferred to:PACU  Handoff Report: Identifed the Patient, Identified the Reponsible Provider, Reviewed the pertinent medical history, Discussed the surgical course, Reviewed Intra-OP anesthesia mangement and issues during anesthesia, Set expectations for post-procedure period and Allowed opportunity for questions and acknowledgement of understanding      Vitals: (Last set prior to Anesthesia Care Transfer)    CRNA VITALS  8/22/2019 0802 - 8/22/2019 0837      8/22/2019             Resp Rate (observed):  14                Electronically Signed By: Melly Velazco CRNA, APRN MARJ  August 22, 2019  8:37 AM

## 2019-08-22 NOTE — ANESTHESIA PREPROCEDURE EVALUATION
Anesthesia Pre-Procedure Evaluation    Patient: Kristal Lester   MRN:     [id number] Gender:   female   Age:    59 year old :      1960        Preoperative Diagnosis: Right Si Joint Dysfunction   Procedure(s):  Right Minimal Invasive Sacral Iliac Joint Fusion     Past Medical History:   Diagnosis Date     Benign paroxysmal positional vertigo, left 7/10/2019     HLD (hyperlipidemia)      PVC's (premature ventricular contractions)      SI (sacroiliac) joint dysfunction       Past Surgical History:   Procedure Laterality Date     Bunion surgery  2017      SECTION  5 years ago, 35 years ago     ELBOW SURGERY Right      MAMMOPLASTY AUGMENTATION  23 years ago          Anesthesia Evaluation     .             ROS/MED HX    ENT/Pulmonary:       Neurologic:  - neg neurologic ROS     Cardiovascular:  - neg cardiovascular ROS       METS/Exercise Tolerance:     Hematologic:  - neg hematologic  ROS       Musculoskeletal:         GI/Hepatic:  - neg GI/hepatic ROS       Renal/Genitourinary:  - ROS Renal section negative       Endo:  - neg endo ROS       Psychiatric:  - neg psychiatric ROS       Infectious Disease:         Malignancy:         Other:                         PHYSICAL EXAM:   Mental Status/Neuro: Age Appropriate   Airway:   Mallampati: I  Mouth/Opening: Full  TM distance: < 6 cm  Neck ROM: Full   Respiratory: Auscultation: CTAB     Resp. Rate: Normal     Resp. Effort: Normal      CV: Rhythm: Regular  Rate: Age appropriate  Heart: Normal Sounds  Edema: None   Comments:                    Anesthesia Pre-Procedure Evaluation    Patient: Kristal Lester   MRN:     [id number] Gender:   female   Age:    59 year old :      1960        Preoperative Diagnosis: * No surgery found *        Past Medical History:   Diagnosis Date     Benign paroxysmal positional vertigo, left 7/10/2019     HLD (hyperlipidemia)      PVC's (premature ventricular contractions)      SI (sacroiliac) joint dysfunction        Past Surgical History:   Procedure Laterality Date     Bunion surgery  2017      SECTION  5 years ago, 35 years ago     ELBOW SURGERY Right      MAMMOPLASTY AUGMENTATION  23 years ago          Anesthesia Evaluation     . Pt has had prior anesthetic. Type: General and Regional    No history of anesthetic complications          ROS/MED HX    ENT/Pulmonary:  - neg pulmonary ROS     Neurologic:     (+)other neuro benign paroxysmal positional vertigo s/p Eply     Cardiovascular:     (+) Dyslipidemia, ----. : . . . :. . Previous cardiac testing date:results:Stress Testdate: results:ECG reviewed date: results:SR with frequent PVCs date: results:         (-) taking anticoagulants/antiplatelets   METS/Exercise Tolerance:  >4 METS   Hematologic:  - neg hematologic  ROS       Musculoskeletal:   (+)  other musculoskeletal- SI (sacroiliac) joint dysfunction       GI/Hepatic:  - neg GI/hepatic ROS       Renal/Genitourinary:  - ROS Renal section negative       Endo:  - neg endo ROS       Psychiatric:  - neg psychiatric ROS       Infectious Disease:  - neg infectious disease ROS       Malignancy:      - no malignancy   Other:    (+) C-spine cleared: N/A, H/O Chronic Pain,no other significant disability                        PHYSICAL EXAM:   Mental Status/Neuro: A/A/O; Age Appropriate   Airway: Facies: Feasible  Mallampati: I  Mouth/Opening: Full  TM distance: > 6 cm  Neck ROM: Limited   Respiratory: Auscultation: CTAB     Resp. Rate: Normal     Resp. Effort: Normal      CV: Rhythm: Regular  Heart: Normal Sounds  Edema: None   Comments:      Dental: Retainer  Retainer: Upper                LABS:  CBC:   Lab Results   Component Value Date    HGB 13.9 2019     BMP:   Lab Results   Component Value Date     (H) 2019     COAGS: No results found for: PTT, INR, FIBR  POC:   Lab Results   Component Value Date    HCG Negative 2019     OTHER: No results found for: PH, LACT, A1C, JOCE, PHOS, MAG,  "ALBUMIN, PROTTOTAL, ALT, AST, GGT, ALKPHOS, BILITOTAL, BILIDIRECT, LIPASE, AMYLASE, MARRY, TSH, T4, T3, CRP, SED     Preop Vitals    BP Readings from Last 3 Encounters:   08/22/19 111/67   08/06/19 135/89   02/19/19 114/79    Pulse Readings from Last 3 Encounters:   08/22/19 74   08/06/19 71   02/19/19 72      Resp Readings from Last 3 Encounters:   08/22/19 18   08/06/19 16   02/19/19 13    SpO2 Readings from Last 3 Encounters:   08/22/19 99%   08/06/19 96%   02/19/19 97%      Temp Readings from Last 1 Encounters:   08/22/19 36.6  C (97.9  F) (Oral)    Ht Readings from Last 1 Encounters:   08/22/19 1.651 m (5' 5\")      Wt Readings from Last 1 Encounters:   08/22/19 77.1 kg (169 lb 15.6 oz)    Estimated body mass index is 28.29 kg/m  as calculated from the following:    Height as of an earlier encounter on 8/22/19: 1.651 m (5' 5\").    Weight as of an earlier encounter on 8/22/19: 77.1 kg (169 lb 15.6 oz).     LDA:        SAMIA FV AN PLAN NO PONV RULE       PAC Discussion and Assessment    ASA Classification: 2  Case is suitable for: [facility]  Anesthetic techniques and relevant risks discussed: GA  Invasive monitoring and risk discussed: No  Types:   Possibility and Risk of blood transfusion discussed: No  NPO instructions given:   Additional anesthetic preparation and risks discussed:   Needs early admission to pre-op area:   Other:     PAC Resident/NP Anesthesia Assessment:  Kristal Lester is a 59 year old female scheduled to undergo Right Minimally Invasive SI Joint Fusion with O-Arm/Stealth with Dr. Urbina on 8/22/19. She has the following specific operative considerations:   - RCRI : No serious cardiac risks.   - VTE risk: 0.26%  - CANDIDO # of risks 1/8 = Low risk  - Risk of PONV score = 3.  If > 2, anti-emetic intervention recommended. If 3 or > anti emetic intervention recommended with two or more meds    --SI joint dysfunction with above procedure planned.   --HLD. Crestor at HS. History of PVCs, evaluated by " "Cardiology in 2017. Negative stress echo and PVCs were extinguished with exertion. Testing above. No other cardiac history, symptoms or meds. Has been runner over time.   --Nonsmoker. Denies pulmonary symptoms.   --BPPV recent exacerbation s/p Eply and resolved.       Patient was reviewed by Dr. Ernandez.       Reviewed and Signed by PAC Mid-Level Provider/Resident  Mid-Level Provider/Resident: MANN Fairchild CNS  Date: 8/6/19  Time: 10:15am    Attending Anesthesiologist Anesthesia Assessment:        Anesthesiologist:   Date:   Time:   Pass/Fail:   Disposition:     PAC Pharmacist Assessment:        Pharmacist:   Date:   Time:    Rickie Hinojosa MD    LABS:  CBC:   Lab Results   Component Value Date    HGB 13.9 08/22/2019     BMP: No results found for: NA, POTASSIUM, CHLORIDE, CO2, BUN, CR, GLC  COAGS: No results found for: PTT, INR, FIBR  POC:   Lab Results   Component Value Date    HCG Negative 08/22/2019     OTHER: No results found for: PH, LACT, A1C, JOCE, PHOS, MAG, ALBUMIN, PROTTOTAL, ALT, AST, GGT, ALKPHOS, BILITOTAL, BILIDIRECT, LIPASE, AMYLASE, MARRY, TSH, T4, T3, CRP, SED     Preop Vitals    BP Readings from Last 3 Encounters:   08/22/19 111/67   08/06/19 135/89   02/19/19 114/79    Pulse Readings from Last 3 Encounters:   08/22/19 74   08/06/19 71   02/19/19 72      Resp Readings from Last 3 Encounters:   08/22/19 18   08/06/19 16   02/19/19 13    SpO2 Readings from Last 3 Encounters:   08/22/19 99%   08/06/19 96%   02/19/19 97%      Temp Readings from Last 1 Encounters:   08/22/19 36.6  C (97.9  F) (Oral)    Ht Readings from Last 1 Encounters:   08/22/19 1.651 m (5' 5\")      Wt Readings from Last 1 Encounters:   08/22/19 77.1 kg (169 lb 15.6 oz)    Estimated body mass index is 28.29 kg/m  as calculated from the following:    Height as of this encounter: 1.651 m (5' 5\").    Weight as of this encounter: 77.1 kg (169 lb 15.6 oz).     LDA:        Assessment:   ASA SCORE: 2    H&P: History and physical " reviewed and following examination; no interval change.         Plan:   Anes. Type:  General   Pre-Medication: None   Induction:  IV (Standard)   Airway: ETT   Access/Monitoring: PIV   Maintenance: Balanced     Postop Plan:   Postop Pain: Opioids  Postop Sedation/Airway: Not planned     PONV Management:   Adult Risk Factors: Female, Postop Opioids   Prevention: Ondansetron, Dexamethasone     CONSENT: Direct conversation   Plan and risks discussed with: Patient   Blood Products: Consented (ALL Blood Products)       Comments for Plan/Consent:  GA with ETT  Risks versus benefits discussed. All questions answered  precedex for emergence  Dilaudid for analgesia.                  Rickie Hinojosa MD

## 2019-08-22 NOTE — DISCHARGE INSTRUCTIONS
Same-Day Surgery   Adult Discharge Orders & Instructions     For 24 hours after surgery:  1. Get plenty of rest.  A responsible adult must stay with you for at least 24 hours after you leave the hospital.   2. Pain medication can slow your reflexes. Do not drive or use heavy equipment.  If you have weakness or tingling, don't drive or use heavy equipment until this feeling goes away.  3. Mixing alcohol and pain medication can cause dizziness and slow your breathing. It can even be fatal. Do not drink alcohol while taking pain medication.  4. Avoid strenuous or risky activities.  Ask for help when climbing stairs.   5. You may feel lightheaded.  If so, sit for a few minutes before standing.  Have someone help you get up.   6. If you have nausea (feel sick to your stomach), drink only clear liquids such as apple juice, ginger ale, broth or 7-Up.  Rest may also help.  Be sure to drink enough fluids.  Move to a regular diet as you feel able. Take pain medications with a small amount of solid food, such as toast or crackers, to avoid nausea.   7. A slight fever is normal. Call the doctor if your fever is over 100 F (37.7 C) (taken under the tongue) or lasts longer than 24 hours.  8. You may have a dry mouth, muscle aches, trouble sleeping or a sore throat.  These symptoms should go away after 24 hours.  9. Do not make important or legal decisions.   Pain Management:      1. Take pain medication (if prescribed) for pain as directed by your physician.        2. WARNING: If the pain medication you have been prescribed contains Tylenol  (acetaminophen), DO NOT take additional doses of Tylenol (acetaminophen).     Call your doctor for any of the followin.  Signs of infection (fever, growing tenderness at the surgery site, severe pain, a large amount of drainage or bleeding, foul-smelling drainage, redness, swelling).    2.  It has been over 8 to 10 hours since surgery and you are still not able to urinate (pee).    3.   "Headache for over 24 hours.    4.  Numbness, tingling or weakness the day after surgery (if you had spinal anesthesia).  To contact a doctor, call _____________________________________ or:      [phone] and ask for the Resident On Call for:          __________________________________________ (answered 24 hours a day)      Emergency Department:  [facility]: [phone]  [facility]: [phone].    Safety Tips for Using Crutches    Crutch Fit:    Assume good standing posture with shoulders relaxed and crutch tips 6-8 inches out from the side of the foot.    The underarm pad should fall 2-3 fingers width below the armpit.    The handgrip is positioned level with the wrist to allow 30  flexion at the elbow.    Safety Tips:    Bear weight on your hands, not on your armpits.    Do not add extra padding to the underarm pad. This will, in effect, lengthen the crutches and increase risk of nerve injury.    Wear flat, properly fitting shoes. Do not walk in stocking feet, high heels or slippers.    Household hazards:  --Throw rugs should be removed from floors.  --Stairs should be cleared of obstacles.  --Use extra caution on slippery, highly polished, littered or uneven floor surfaces.  --Check for electric cords.    Check crutch tips for excessive wear and keep wing nuts tight.    While walking, look forward with  head up  and  eyes open.  Take equal length steps.    Use BOTH crutches.    Stairs Sequence:    UP: \"Good\" leg first, followed by  bad  leg, then crutches.    DOWN: Crutches, followed by  bad  leg, \"good\" leg.     Walking with Crutches:    Move both crutches forward at the same time.    Non-Weight Bearing (NWB):  Hold the involved leg up and swing through the crutches with the involved leg. The involved leg does not touch the floor.    Toe Touch Weight Bearing (TTWB): Move the involved leg forward. Rest it lightly on the floor for balance only. Step through the crutches " with the uninvolved leg.    Partial Weight Bearing (PWB): Move the involved leg forward. Step down the weight of the leg only.  Step through the crutches with the uninvolved leg.    Weight Bearing As Tolerated (WBAT): Move the involved leg forward. Put as much pressure through the involved leg as you can tolerate comfortably. Then step through the crutches with the uninvolved leg.    Rev. 4/2014

## 2019-08-22 NOTE — OP NOTE
DATE OF SURGERY: 8/22/2019    PREOPERATIVE DIAGNOSIS: Right Si Joint Dysfunction             POSTOPERATIVE DIAGNOSIS: Same    PROCEDURES:  1. Right Minimally Invasive SI Joint Fusion with SI Bone Implants  2. Use of Stealth/Oarm Guidance    PRIMARY SURGEON: Mac Urbina MD    FIRST ASSISTANT: BLANCHE Lee the assistant was necessary for retraction visualization and wound closure.  There is no qualified resident available for the case.    ANESTHESIA: General Endotracheal    COMPLICATIONS:  None.    SPECIMENS: None.    ESTIMATED BLOOD LOSS: 10 mL    INDICATIONS:                          Kristal Lester is a 59 year old female who elected surgical treatment, and understood the indications for this surgery, as well as its risks, benefits, and alternatives as documented in the pre-operative H&P.  Specifically, we reviewed the risks and benefits of the surgery in detail. The risks include, but are not limited to, the general risks associated with anesthesia, including death, pulmonary embolism, DVT, stroke, myocardial infarction, pneumonia, and urinary tract infection. Additional risks specific to the surgery include the risk of infection, dural tear with resultant CSF leak which might necessitate placement of a drain or revision surgery or could result in headaches, nerve injury resulting in weakness or paralysis, risk of adjacent segment disease, the risks of vascular injury, need for revision surgery in the future due to one of the above issues, or risk of incomplete symptom relief. Kristal Lester understands the risks of the surgery and wishes to proceed.  No Guarantees were given.   In addition, we spent substantial time talking about incomplete symptom relief as this sometimes the case with SI joint fusion surgery.  We also talked about nonunion risk and possible need for revision surgery.    DESCRIPTION OF PROCEDURE:           Kristal Lester was taken to the operating  room, where the  Anesthesiology Service induced satisfactory general anesthesia.  Ancef was given IV.  Venous thromboembolic prophylaxis was performed with sequential devices.  A Mir catheter was placed under standard sterile techniques.  The patient was placed prone on an open OSI frame with the abdomen hanging free and all bony prominences well padded.  The low back was then prepped and draped in its entirety in the usual sterile fashion.  We then held a multidisciplinary time out in which we verified the patient, procedure, antibiotics, and operative plan.  All team members were in agreement.    I then placed a percutaneous reference pin in the left posterior superior iliac spine.  He brought in the navigation and took our initial spin.  I then marked out the bony landmarks on the skin.  I made a 3 cm longitudinal incision and dissected the superficial fat down to the fascia.  I then percutaneously placed 3 pins.  One pin was placed into the S1 vertebral body, the second pin was placed into the ventral S1 vertebral body and the third pin was placed into the S2 vertebral body.  I chose the trajectory and length based on the navigation.  The SI bone implants were then placed over each pin in the following fashion.  I used a drill to breech the dorsal cortex of the lateral ilium, I then broached under navigation guidance to the appropriate depth across the SI joint, and the implant itself was malleted into place.  The size and length of the implant was chosen based on the navigation guidance.  This was done at all 3 locations with 2 implants placed into S1 and a third implant placed in the S2 vertebral body.  We then brought the navigation back in and I took a check spin and we verify that the implants were completely intraosseous.    The wound was thoroughly irrigated.  I injected 60 cc of Marcaine for local anesthesia.  The incisions were closed in layers with Monocryl and Dermabond for the skin and a sterile dressing was  applied.       The patient was turned supine, extubated, and returned to the recovery area in stable condition.      I was present and scrubbed for the entire procedure.    Mac Urbina MD

## 2019-08-22 NOTE — BRIEF OP NOTE
Brief Operative Note    Preop Dx:   Right Si Joint Dysfunction  Post op Dx:   same  Procedure:    Procedure(s):  Right Minimal Invasive Sacral Iliac Joint Fusion  Surgeon:     Mac Urbina MD  Assistants:    Nikolay Agustin PA-C  Anesthesia:   General  EBL:    10 ml  Total IV Fluids:   See Anesthesia Record  Specimens:   None  Findings:   See Operative Dictation    .Assessment and Plan: Kristal Lester is a 59 year old female with PMH including pineal gland cyst, Benign paroxysmal positional vertigo now s/p right SI fusion on 8/22/19 with Dr. Urbina.     Carlitos Primary  Activity: Up with assist until independent. No excessive bending or twisting. No lifting >10 lbs x 6 weeks. No Carlos lift for transfers.  Weight bearing status: WBAT.  Pain management: Transition from IV to PO as tolerated. No NSAIDs   Diet: Begin with clear fluids and progress diet as tolerated.   DVT prophylaxis: Ambulation and SCDs only. No chemical DVT ppx needed.  Dressings: 4x4 and Tegaderm.  Physical Therapy/Occupational Therapy: Eval and treat at 3 weeks post op.  Follow-up: Clinic with Dr. Urbina in 6 weeks with repeat x-rays.   Disposition: Pending progress with therapies, pain control on orals, and medical stability, anticipate discharge to home on POD #0.

## 2019-08-24 NOTE — ANESTHESIA POSTPROCEDURE EVALUATION
Anesthesia POST Procedure Evaluation    Patient: Kristal Lester   MRN:     [id number] Gender:   female   Age:    59 year old :      1960        Preoperative Diagnosis: Right Si Joint Dysfunction   Procedure(s):  Right Minimal Invasive Sacral Iliac Joint Fusion   Postop Comments: No value filed.       Anesthesia Type:  Not documented  No value filed.    Reportable Event: NO     PAIN: Uncomplicated   Sign Out status: Comfortable, Well controlled pain     PONV: No PONV   Sign Out status:  No Nausea or Vomiting     Neuro/Psych: Uneventful perioperative course   Sign Out Status: Preoperative baseline; Age appropriate mentation     Airway/Resp.: Uneventful perioperative course   Sign Out Status: Non labored breathing, age appropriate RR; Resp. Status within EXPECTED Parameters     CV: Uneventful perioperative course   Sign Out status: Appropriate BP and perfusion indices; Appropriate HR/Rhythm     Disposition:   Sign Out in:  PACU  Disposition:  Phase II; Home  Recovery Course: Uneventful  Follow-Up: Not required           Last Anesthesia Record Vitals:  [facility] VITALS  2019 0802 - 2019 0902      2019             Resp Rate (observed):  14          Last PACU Vitals:  Vitals Value Taken Time   /86 2019  9:59 AM   Temp 36.5  C (97.7  F) 2019 10:00 AM   Pulse 73 2019  9:59 AM   Resp 18 2019 10:00 AM   SpO2 98 % 2019  9:53 AM   Temp src     NIBP     Pulse     SpO2     Resp     Temp     Ht Rate     Temp 2     Vitals shown include unvalidated device data.      Electronically Signed By: Rickie Hinojosa MD, 2019, 10:42 AM

## 2019-09-03 DIAGNOSIS — M53.3 SI (SACROILIAC) JOINT DYSFUNCTION: Primary | ICD-10-CM

## 2019-09-04 ENCOUNTER — DOCUMENTATION ONLY (OUTPATIENT)
Dept: CARE COORDINATION | Facility: CLINIC | Age: 59
End: 2019-09-04

## 2019-09-09 ENCOUNTER — THERAPY VISIT (OUTPATIENT)
Dept: PHYSICAL THERAPY | Facility: CLINIC | Age: 59
End: 2019-09-09
Attending: ORTHOPAEDIC SURGERY
Payer: COMMERCIAL

## 2019-09-09 DIAGNOSIS — G89.18 ACUTE POST-OPERATIVE PAIN: ICD-10-CM

## 2019-09-09 DIAGNOSIS — M53.3 SACROILIAC DYSFUNCTION: ICD-10-CM

## 2019-09-09 DIAGNOSIS — M53.3 SI (SACROILIAC) JOINT DYSFUNCTION: ICD-10-CM

## 2019-09-09 PROCEDURE — 97161 PT EVAL LOW COMPLEX 20 MIN: CPT | Mod: GP | Performed by: PHYSICAL THERAPIST

## 2019-09-09 PROCEDURE — 97110 THERAPEUTIC EXERCISES: CPT | Mod: GP | Performed by: PHYSICAL THERAPIST

## 2019-09-09 NOTE — PROGRESS NOTES
[facility] Initial Evaluation  Subjective:  The history is provided by the patient. No  was used.   Kristal Lester being seen for PT for post op SI Joint Fusion Surgery on 8/22/2019.   Problem began 8/22/2019. Where condition occurred: for unknown reasons.Problem occurred: Not sure  and reported as 2/10 and 3/10 on pain scale. General health as reported by patient is good. Pertinent medical history includes:  Menopausal.    Surgeries include:  Orthopedic surgery. Other surgery history details: SI Fusion Surgery.  Current medications:  None.   Primary job tasks include:  Computer work and prolonged sitting.  Pain is described as aching, burning, shooting and stabbing and is intermittent. Pain is worse during the night. Since onset symptoms are gradually improving. Special tests:  Bone scan, CT scan, MRI and x-ray. Previous treatment includes physical therapy, surgery and chiropractic. There was significant improvement following previous treatment.   Patient is . Restrictions include:  Working in normal job without restrictions.    Barriers include:  Bathroom/bedroom on second floor and stairs.    Type of problem:  Sacroiliac   Condition occurred with:  Other reason and a fall/slip (2017). This is a new condition   Problem details: Initial injury 2017. Right SI fusion 8-22-19. Buttock pain resolved since surgery but feels pain at incision site now. Has not been consistent with using crutches as prescribed TTWB x first 3 weeks. .   Patient reports pain:  SI joint right. Radiates to:  Thigh right. Associated symptoms:  Loss of motion/stiffness. Exacerbated by: activity. and relieved by rest (tylenol @ night for sleep).                      Objective:  Standing Alignment:        Lumbar:  Normal  Pelvic:  Normal          Gait:  TTWB  Gait Type:  Antalgic   Assistive Devices:  Crutches      Flexibility/Screens:   Positive screens:  SI Joint    Lower  Extremity:  Decreased left lower extremity flexibility:Hamstrings    Decreased right lower extremity flexibility:  Hamstrings               Lumbar/SI Evaluation  ROM:    AROM Lumbar:   Flexion:          N/A  Ext:                    N/A   Side Bend:        Left:  N/A    Right:  N/A  Rotation:           Left:     Right:   Side Glide:        Left:     Right:         Strength: Fair Core Strength   Lumbar Myotomes:  normal            Lumbar DTR's:  not assessed      Cord Signs:  not assessed    Lumbar Dermtomes:  not assessed                Neural Tension/Mobility:  Lumbar:  Normal        Lumbar Palpation:  not assessed      Functional Tests:  not assessed        Lumbar Provocation:  not assessed                                                     General     ROS    Assessment/Plan:    Patient is a 59 year old female with sacral complaints.    Patient has the following significant findings with corresponding treatment plan.                Diagnosis 1:  S/P R SI Fusion   Pain -  self management, education and home program  Decreased ROM/flexibility - manual therapy, therapeutic exercise and home program  Decreased strength - therapeutic exercise, therapeutic activities and home program  Inflammation - self management/home program  Impaired gait - gait training, assistive devices and home program  Impaired muscle performance - neuro re-education and home program  Decreased function - therapeutic activities and home program    Therapy Evaluation Codes:   1) History comprised of:   Personal factors that impact the plan of care:      Time since onset of symptoms.    Comorbidity factors that impact the plan of care are:      See Health History.     Medications impacting care: See Health History.  2) Examination of Body Systems comprised of:   Body structures and functions that impact the plan of care:      Sacral illiac joint.   Activity limitations that impact the plan of care are:      Bathing, Bending, Cooking, Driving,  Dressing, Lifting, Sitting, Squatting/kneeling, Stairs, Standing, Walking, Working and Sleeping.  3) Clinical presentation characteristics are:   Stable/Uncomplicated.  4) Decision-Making    Low complexity using standardized patient assessment instrument and/or measureable assessment of functional outcome.  Cumulative Therapy Evaluation is: Low complexity.    Previous and current functional limitations:  (See Goal Flow Sheet for this information)    Short term and Long term goals: (See Goal Flow Sheet for this information)     Communication ability:  Patient appears to be able to clearly communicate and understand verbal and written communication and follow directions correctly.  Treatment Explanation - The following has been discussed with the patient:   RX ordered/plan of care  Anticipated outcomes  Possible risks and side effects  This patient would benefit from PT intervention to resume normal activities.   Rehab potential is good.    Frequency:  2 X week, once daily  Duration:  for 6 weeks  Discharge Plan:  Achieve all LTG.  Independent in home treatment program.  Return to previous functional level by discharge.  Reach maximal therapeutic benefit.    Please refer to the daily flowsheet for treatment today, total treatment time and time spent performing 1:1 timed codes.

## 2019-09-16 ENCOUNTER — THERAPY VISIT (OUTPATIENT)
Dept: PHYSICAL THERAPY | Facility: CLINIC | Age: 59
End: 2019-09-16
Attending: ORTHOPAEDIC SURGERY
Payer: COMMERCIAL

## 2019-09-16 DIAGNOSIS — G89.18 ACUTE POST-OPERATIVE PAIN: ICD-10-CM

## 2019-09-16 DIAGNOSIS — M53.3 SACROILIAC DYSFUNCTION: ICD-10-CM

## 2019-09-16 DIAGNOSIS — R26.9 ABNORMAL GAIT: ICD-10-CM

## 2019-09-16 PROCEDURE — 97116 GAIT TRAINING THERAPY: CPT | Mod: GP | Performed by: PHYSICAL THERAPIST

## 2019-09-16 PROCEDURE — 97140 MANUAL THERAPY 1/> REGIONS: CPT | Mod: GP | Performed by: PHYSICAL THERAPIST

## 2019-09-16 PROCEDURE — 97112 NEUROMUSCULAR REEDUCATION: CPT | Mod: GP | Performed by: PHYSICAL THERAPIST

## 2019-09-19 ENCOUNTER — THERAPY VISIT (OUTPATIENT)
Dept: PHYSICAL THERAPY | Facility: CLINIC | Age: 59
End: 2019-09-19
Payer: COMMERCIAL

## 2019-09-19 DIAGNOSIS — M53.3 SACROILIAC DYSFUNCTION: ICD-10-CM

## 2019-09-19 DIAGNOSIS — G89.18 ACUTE POST-OPERATIVE PAIN: ICD-10-CM

## 2019-09-19 DIAGNOSIS — R26.9 ABNORMAL GAIT: ICD-10-CM

## 2019-09-19 PROCEDURE — 97140 MANUAL THERAPY 1/> REGIONS: CPT | Mod: GP | Performed by: PHYSICAL THERAPIST

## 2019-09-19 PROCEDURE — 97112 NEUROMUSCULAR REEDUCATION: CPT | Mod: GP | Performed by: PHYSICAL THERAPIST

## 2019-09-19 PROCEDURE — 97116 GAIT TRAINING THERAPY: CPT | Mod: GP | Performed by: PHYSICAL THERAPIST

## 2019-09-23 ENCOUNTER — THERAPY VISIT (OUTPATIENT)
Dept: PHYSICAL THERAPY | Facility: CLINIC | Age: 59
End: 2019-09-23
Attending: ORTHOPAEDIC SURGERY
Payer: COMMERCIAL

## 2019-09-23 DIAGNOSIS — M53.3 SACROILIAC DYSFUNCTION: ICD-10-CM

## 2019-09-23 DIAGNOSIS — G89.18 ACUTE POST-OPERATIVE PAIN: ICD-10-CM

## 2019-09-23 DIAGNOSIS — R26.9 ABNORMAL GAIT: ICD-10-CM

## 2019-09-23 PROCEDURE — 97112 NEUROMUSCULAR REEDUCATION: CPT | Mod: GP | Performed by: PHYSICAL THERAPIST

## 2019-09-23 PROCEDURE — 97110 THERAPEUTIC EXERCISES: CPT | Mod: GP | Performed by: PHYSICAL THERAPIST

## 2019-09-23 PROCEDURE — 97140 MANUAL THERAPY 1/> REGIONS: CPT | Mod: GP | Performed by: PHYSICAL THERAPIST

## 2019-09-26 ENCOUNTER — THERAPY VISIT (OUTPATIENT)
Dept: PHYSICAL THERAPY | Facility: CLINIC | Age: 59
End: 2019-09-26
Payer: COMMERCIAL

## 2019-09-26 DIAGNOSIS — M53.3 SACROILIAC DYSFUNCTION: ICD-10-CM

## 2019-09-26 DIAGNOSIS — R26.9 ABNORMAL GAIT: ICD-10-CM

## 2019-09-26 DIAGNOSIS — G89.18 ACUTE POST-OPERATIVE PAIN: ICD-10-CM

## 2019-09-26 PROCEDURE — 97110 THERAPEUTIC EXERCISES: CPT | Mod: GP | Performed by: PHYSICAL THERAPIST

## 2019-09-26 PROCEDURE — 97112 NEUROMUSCULAR REEDUCATION: CPT | Mod: GP | Performed by: PHYSICAL THERAPIST

## 2019-09-26 PROCEDURE — 97140 MANUAL THERAPY 1/> REGIONS: CPT | Mod: GP | Performed by: PHYSICAL THERAPIST

## 2019-09-28 ASSESSMENT — ENCOUNTER SYMPTOMS
NAIL CHANGES: 0
SKIN CHANGES: 0
POOR WOUND HEALING: 0

## 2019-09-30 ENCOUNTER — THERAPY VISIT (OUTPATIENT)
Dept: PHYSICAL THERAPY | Facility: CLINIC | Age: 59
End: 2019-09-30
Payer: COMMERCIAL

## 2019-09-30 DIAGNOSIS — M53.3 SI (SACROILIAC) JOINT DYSFUNCTION: Primary | ICD-10-CM

## 2019-09-30 DIAGNOSIS — M53.3 SACROILIAC DYSFUNCTION: ICD-10-CM

## 2019-09-30 DIAGNOSIS — R26.9 ABNORMAL GAIT: ICD-10-CM

## 2019-09-30 DIAGNOSIS — G89.18 ACUTE POST-OPERATIVE PAIN: ICD-10-CM

## 2019-09-30 PROCEDURE — 97140 MANUAL THERAPY 1/> REGIONS: CPT | Mod: GP | Performed by: PHYSICAL THERAPIST

## 2019-09-30 PROCEDURE — 97112 NEUROMUSCULAR REEDUCATION: CPT | Mod: GP | Performed by: PHYSICAL THERAPIST

## 2019-09-30 PROCEDURE — 97530 THERAPEUTIC ACTIVITIES: CPT | Mod: GP | Performed by: PHYSICAL THERAPIST

## 2019-09-30 NOTE — PROGRESS NOTES
Subjective:  HPI  Oswestry Score: 54 %                 Objective:  System    Physical Exam    General     ROS    Assessment/Plan:    PROGRESS  REPORT    Progress reporting period is from 9-9-19 to 9-30-19.       SUBJECTIVE  Subjective changes noted by patient:  Kristal reports that she tends to use single crutch vs SEC. She reports WBAT with 0-2/10 pain.     Current pain level is: 2/10.     Initial Pain level: 6/10.   Changes in function:  Yes (See Goal flowsheet attached for changes in current functional level)  Adverse reaction to treatment or activity: None    OBJECTIVE  Changes noted in objective findings:  The objective findings below are from DOS: At 5.5 weeks S/P right SI Fusion, Kristal is ambulating in near normal gait on level surfaces with SEC or single crutch.  TTP right glut med and max. Incision healing well. Following [facility] protocol.      ASSESSMENT/PLAN  Updated problem list and treatment plan: Diagnosis 1:  S/P R SI Fusion  Pain -  hot/cold therapy, manual therapy, self management, education and home program  Decreased ROM/flexibility - manual therapy, therapeutic exercise and home program  Decreased joint mobility - manual therapy, therapeutic exercise and home program  Decreased strength - therapeutic exercise, therapeutic activities and home program  Inflammation - cold therapy and self management/home program  Impaired gait - gait training, assistive devices and home program  Impaired muscle performance - neuro re-education and home program  Decreased function - therapeutic activities and home program  STG/LTGs have been met or progress has been made towards goals:  Yes (See Goal flow sheet completed today.)  Assessment of Progress: The patient's condition is improving.  Self Management Plans:  Patient has been instructed in a home treatment program.  Patient  has been instructed in self management of symptoms.  I have re-evaluated this patient and find that the nature, scope, duration and  intensity of the therapy is appropriate for the medical condition of the patient.  Kristal continues to require the following intervention to meet STG and LTG's:  PT    Recommendations:  This patient would benefit from continued therapy.     Frequency:  2 X week, once daily  Duration:  for 4 weeks        Please refer to the daily flowsheet for treatment today, total treatment time and time spent performing 1:1 timed codes.

## 2019-10-01 ENCOUNTER — ANCILLARY PROCEDURE (OUTPATIENT)
Dept: GENERAL RADIOLOGY | Facility: CLINIC | Age: 59
End: 2019-10-01
Attending: ORTHOPAEDIC SURGERY
Payer: COMMERCIAL

## 2019-10-01 ENCOUNTER — OFFICE VISIT (OUTPATIENT)
Dept: ORTHOPEDICS | Facility: CLINIC | Age: 59
End: 2019-10-01
Payer: COMMERCIAL

## 2019-10-01 VITALS — BODY MASS INDEX: 28.16 KG/M2 | WEIGHT: 169 LBS | HEIGHT: 65 IN

## 2019-10-01 DIAGNOSIS — M53.3 SI (SACROILIAC) JOINT DYSFUNCTION: Primary | ICD-10-CM

## 2019-10-01 ASSESSMENT — MIFFLIN-ST. JEOR: SCORE: 1342.46

## 2019-10-01 NOTE — LETTER
Return to Work  2019     Seen today: yes    Patient:  Kristal Lester  :   1960  MRN:     [id number]  Physician: DOROTHY MOE    Kristal Lester may return to work on Date: 10/7/19.      The next clinic appointment is scheduled for (date/time) 6 months from now.    Patient limitations:  none      Electronically signed by Dorohty Moe MD

## 2019-10-01 NOTE — LETTER
10/1/2019       RE: Kristal Lester  [address]  [place] [address]     Dear Colleague,    Thank you for referring your patient, Kristal Lester, to the [facility] at [facility]. Please see a copy of my visit note below.    Spine Surgery Return Clinic Visit      Chief Complaint:   No chief complaint on file.      Interval HPI:  Symptom Profile Including: location of symptoms, onset, severity, exacerbating/alleviating factors, previous treatments:        Kristal Lester is a 59 year old female who is here for 6-week follow-up S/P right minimally invasive sacroiliac joint fusion on 19 with Dr. Urbina.  Patient reports that she has been doing very well, and says that she immediately felt relief of her right sacroiliac pain after surgery.  Notes that she is still little sore around her incision site and over bilateral hips.  She has been going to physical therapy.  She has been walking with a crutch, but transitioned to a cane yesterday.  She thinks her gait is still a little off because she is scared of causing pain in the right SI joint.  She is no longer on narcotic medications, and takes Tylenol PRN for pain.    She also notes that she has had pins-and-needles sensation in bilateral feet recently.  Says that this only occurs at night.  No other radicular symptoms in lower extremities. Says she was diagnosed with tarsal tunnel syndrome in the past. No other concerns or complaints today.            Past Medical History:     Past Medical History:   Diagnosis Date     Benign paroxysmal positional vertigo, left 7/10/2019     HLD (hyperlipidemia)      PVC's (premature ventricular contractions)      SI (sacroiliac) joint dysfunction             Past Surgical History:     Past Surgical History:   Procedure Laterality Date     Bunion surgery  2017      SECTION  5 years ago, 35 years ago     ELBOW SURGERY Right      MAMMOPLASTY AUGMENTATION  23 years  ago     OPTICAL TRACKING SYSTEM FUSION SACRAL ILIAC Right 8/22/2019    Procedure: Right Minimal Invasive Sacral Iliac Joint Fusion;  Surgeon: Mac Urbina MD;  Location: [facility] OR            Social History:     Social History     Tobacco Use     Smoking status: Never Smoker     Smokeless tobacco: Never Used   Substance Use Topics     Alcohol use: Not Currently            Family History:     Family History   Problem Relation Age of Onset     Scleroderma Mother      Chronic Obstructive Pulmonary Disease Father      Heart Disease Father      Hyperlipidemia Father      Alzheimer Disease Maternal Grandmother      Parkinsonism Maternal Grandfather      Mental Illness Brother             Allergies:     Allergies   Allergen Reactions     Bees Shortness Of Breath     Cellulitis of the leg            Medications:     Current Outpatient Medications   Medication     acetaminophen (TYLENOL 8 HOUR) 650 MG CR tablet     diazepam (VALIUM) 5 MG tablet     docusate sodium (COLACE) 100 MG capsule     EPINEPHrine (EPIPEN/ADRENACLICK/OR ANY BX GENERIC EQUIV) 0.3 MG/0.3ML injection 2-pack     estradiol (VIVELLE-DOT) 0.05 MG/24HR bi-weekly patch     HYDROcodone-acetaminophen (LORTAB) 5-325 MG tablet     ondansetron (ZOFRAN-ODT) 4 MG ODT tab     polyethylene glycol (MIRALAX/GLYCOLAX) packet     progesterone (PROMETRIUM) 100 MG capsule     rosuvastatin (CRESTOR) 10 MG tablet     senna-docusate (SENOKOT-S/PERICOLACE) 8.6-50 MG tablet     vitamin D3 (CHOLECALCIFEROL) 2000 units (50 mcg) tablet     No current facility-administered medications for this visit.              Review of Systems:   A focused musculoskeletal and neurologic ROS was performed with pertinent positives and negatives noted in the HPI.  Additional systems were also reviewed and are documented at the bottom of the note.         Physical Exam:   Vitals: There were no vitals taken for this visit.  PHYSICAL EXAM:   Constitutional - Patient is healthy, well-nourished  and appears stated age.   Respiratory - Patient is breathing normally and in no respiratory distress.   Skin - No suspicious rashes or lesions.   Psychiatric - Normal mood and affect.   Cardiovascular - Extremities warm and well perfused.   Eyes - Visual acuity is normal to the written word.   ENT - Hearing intact to the spoken word.   GI - No abdominal distention.   Musculoskeletal - antalgic gait without use of assistive devices.         Lumbar Spine:    Appearance - Normal. Well healed right SI incision without drainage, surrounding erythema or fluctuance    Palpation - Non-tender to palpation    ROM - Full     Motor -        LOWER EXTREMITY Left Right   Hip flexion 5/5 5/5   Knee flexion 5/5 5/5   Knee extension 5/5 5/5   Ankle dorsiflexion 5/5 5/5   Ankle plantarflexion 5/5 5/5   Great toe extension 5/5 5/5        Neurologic - Sensation intact to light touch bilaterally.          Hip Exam: Pain with palpation over bilateral greater trochanters.   No pain with hip log roll and no tenderness over the greater trochanters.    Alignment:  Patient stands with a neutral standing sagittal balance.         Imaging:   We ordered and independently reviewed new radiographs at this clinic visit. The results were discussed with the patient. Findings include: 10/1/2019 pelvis x-ray AP, inlet, outlet, lateral views: Right sacral iliac joint with 3 implants without evidence of haloing or loosening.  Bilateral hips without evidence of severe osteoarthritis.  See full radiographic description in chart.     Assessment and Plan:     59 year old female who is here for 6-week follow-up S/P right minimally invasive sacroiliac joint fusion on 8/22/19 with Dr. Urbina.  Patient doing very well in regards to her right SI joint, and progressing well with physical therapy.  Pain in bilateral hips likely related to issues from her current abnormal gait.  Recommend cont a physical therapy to help with gait issues and strengthening.  She can  start doing modest activity including walking on the treadmill and doing stationary bike.  In 3 months, she can do all activities without restrictions.  The pins-and-needles sensation in her bilateral feet likely from her tarsal tunnel syndrome, and not related to her spine or sacroiliac joint.    -Return to work note given.  No restrictions.  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  - continue with Physical therapy   - follow up in 6 months with repeat XRs    Claudia Milian PA-C    Attending MD (Dr. Mac Urbina) :  I reviewed and verified the history and physical exam of the patient and discussed the patient's management with the other clinical providers involved in this patient's care including any involved residents or physicians assistants. I reviewed the above note and agree with the documented findings and plan of care, which were communicated to the patient.      Mac Urbina MD

## 2019-10-01 NOTE — NURSING NOTE
"Reason For Visit:   Chief Complaint   Patient presents with     Consult     6 week POP RIght SI fusion        Primary MD: Logan Dias  Ref. MD: none   Date of surgery: Dr. Urbina   Type of surgery: Dr. Urbina .  Smoker: No  Request smoking cessation information: No    Ht 1.651 m (5' 5\")   Wt 76.7 kg (169 lb)   BMI 28.12 kg/m           Oswestry (NIKHIL) Questionnaire    OSWESTRY DISABILITY INDEX 9/30/2019   Count 10   Sum 27   Oswestry Score (%) 54            Neck Disability Index (NDI) Questionnaire    No flowsheet data found.                Promis 10 Assessment    PROMIS 10 9/28/2019   In general, would you say your health is: Good   In general, would you say your quality of life is: Good   In general, how would you rate your physical health? Good   In general, how would you rate your mental health, including your mood and your ability to think? Very good   In general, how would you rate your satisfaction with your social activities and relationships? Good   In general, please rate how well you carry out your usual social activities and roles Good   To what extent are you able to carry out your everyday physical activities such as walking, climbing stairs, carrying groceries, or moving a chair? A little   How often have you been bothered by emotional problems such as feeling anxious, depressed or irritable? Rarely   How would you rate your fatigue on average? Mild   How would you rate your pain on average?   0 = No Pain  to  10 = Worst Imaginable Pain 2   In general, would you say your health is: 3   In general, would you say your quality of life is: 3   In general, how would you rate your physical health? 3   In general, how would you rate your mental health, including your mood and your ability to think? 4   In general, how would you rate your satisfaction with your social activities and relationships? 3   In general, please rate how well you carry out your usual social activities and roles. (This includes " activities at home, at work and in your community, and responsibilities as a parent, child, spouse, employee, friend, etc.) 3   To what extent are you able to carry out your everyday physical activities such as walking, climbing stairs, carrying groceries, or moving a chair? 2   In the past 7 days, how often have you been bothered by emotional problems such as feeling anxious, depressed, or irritable? 2   In the past 7 days, how would you rate your fatigue on average? 2   In the past 7 days, how would you rate your pain on average, where 0 means no pain, and 10 means worst imaginable pain? 2   Global Mental Health Score 14   Global Physical Health Score 13   PROMIS TOTAL - SUBSCORES 27                Tae Canseco, ATC

## 2019-10-01 NOTE — PROGRESS NOTES
Spine Surgery Return Clinic Visit      Chief Complaint:   No chief complaint on file.      Interval HPI:  Symptom Profile Including: location of symptoms, onset, severity, exacerbating/alleviating factors, previous treatments:        Kristal Lester is a 59 year old female who is here for 6-week follow-up S/P right minimally invasive sacroiliac joint fusion on 19 with Dr. Urbina.  Patient reports that she has been doing very well, and says that she immediately felt relief of her right sacroiliac pain after surgery.  Notes that she is still little sore around her incision site and over bilateral hips.  She has been going to physical therapy.  She has been walking with a crutch, but transitioned to a cane yesterday.  She thinks her gait is still a little off because she is scared of causing pain in the right SI joint.  She is no longer on narcotic medications, and takes Tylenol PRN for pain.    She also notes that she has had pins-and-needles sensation in bilateral feet recently.  Says that this only occurs at night.  No other radicular symptoms in lower extremities. Says she was diagnosed with tarsal tunnel syndrome in the past. No other concerns or complaints today.            Past Medical History:     Past Medical History:   Diagnosis Date     Benign paroxysmal positional vertigo, left 7/10/2019     HLD (hyperlipidemia)      PVC's (premature ventricular contractions)      SI (sacroiliac) joint dysfunction             Past Surgical History:     Past Surgical History:   Procedure Laterality Date     Bunion surgery  2017      SECTION  5 years ago, 35 years ago     ELBOW SURGERY Right      MAMMOPLASTY AUGMENTATION  23 years ago     OPTICAL TRACKING SYSTEM FUSION SACRAL ILIAC Right 2019    Procedure: Right Minimal Invasive Sacral Iliac Joint Fusion;  Surgeon: Mac Urbina MD;  Location:  OR            Social History:     Social History     Tobacco Use     Smoking status: Never Smoker      Smokeless tobacco: Never Used   Substance Use Topics     Alcohol use: Not Currently            Family History:     Family History   Problem Relation Age of Onset     Scleroderma Mother      Chronic Obstructive Pulmonary Disease Father      Heart Disease Father      Hyperlipidemia Father      Alzheimer Disease Maternal Grandmother      Parkinsonism Maternal Grandfather      Mental Illness Brother             Allergies:     Allergies   Allergen Reactions     Bees Shortness Of Breath     Cellulitis of the leg            Medications:     Current Outpatient Medications   Medication     acetaminophen (TYLENOL 8 HOUR) 650 MG CR tablet     diazepam (VALIUM) 5 MG tablet     docusate sodium (COLACE) 100 MG capsule     EPINEPHrine (EPIPEN/ADRENACLICK/OR ANY BX GENERIC EQUIV) 0.3 MG/0.3ML injection 2-pack     estradiol (VIVELLE-DOT) 0.05 MG/24HR bi-weekly patch     HYDROcodone-acetaminophen (LORTAB) 5-325 MG tablet     ondansetron (ZOFRAN-ODT) 4 MG ODT tab     polyethylene glycol (MIRALAX/GLYCOLAX) packet     progesterone (PROMETRIUM) 100 MG capsule     rosuvastatin (CRESTOR) 10 MG tablet     senna-docusate (SENOKOT-S/PERICOLACE) 8.6-50 MG tablet     vitamin D3 (CHOLECALCIFEROL) 2000 units (50 mcg) tablet     No current facility-administered medications for this visit.              Review of Systems:   A focused musculoskeletal and neurologic ROS was performed with pertinent positives and negatives noted in the HPI.  Additional systems were also reviewed and are documented at the bottom of the note.         Physical Exam:   Vitals: There were no vitals taken for this visit.  PHYSICAL EXAM:   Constitutional - Patient is healthy, well-nourished and appears stated age.   Respiratory - Patient is breathing normally and in no respiratory distress.   Skin - No suspicious rashes or lesions.   Psychiatric - Normal mood and affect.   Cardiovascular - Extremities warm and well perfused.   Eyes - Visual acuity is normal to the written  word.   ENT - Hearing intact to the spoken word.   GI - No abdominal distention.   Musculoskeletal - antalgic gait without use of assistive devices.         Lumbar Spine:    Appearance - Normal. Well healed right SI incision without drainage, surrounding erythema or fluctuance    Palpation - Non-tender to palpation    ROM - Full     Motor -        LOWER EXTREMITY Left Right   Hip flexion 5/5 5/5   Knee flexion 5/5 5/5   Knee extension 5/5 5/5   Ankle dorsiflexion 5/5 5/5   Ankle plantarflexion 5/5 5/5   Great toe extension 5/5 5/5        Neurologic - Sensation intact to light touch bilaterally.          Hip Exam: Pain with palpation over bilateral greater trochanters.   No pain with hip log roll and no tenderness over the greater trochanters.    Alignment:  Patient stands with a neutral standing sagittal balance.         Imaging:   We ordered and independently reviewed new radiographs at this clinic visit. The results were discussed with the patient. Findings include: 10/1/2019 pelvis x-ray AP, inlet, outlet, lateral views: Right sacral iliac joint with 3 implants without evidence of haloing or loosening.  Bilateral hips without evidence of severe osteoarthritis.  See full radiographic description in chart.       Assessment and Plan:     59 year old female who is here for 6-week follow-up S/P right minimally invasive sacroiliac joint fusion on 8/22/19 with Dr. Urbina.  Patient doing very well in regards to her right SI joint, and progressing well with physical therapy.  Pain in bilateral hips likely related to issues from her current abnormal gait.  Recommend cont a physical therapy to help with gait issues and strengthening.  She can start doing modest activity including walking on the treadmill and doing stationary bike.  In 3 months, she can do all activities without restrictions.  The pins-and-needles sensation in her bilateral feet likely from her tarsal tunnel syndrome, and not related to her spine or  sacroiliac joint.    -Return to work note given.  No restrictions.  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  - continue with Physical therapy   - follow up in 6 months with repeat XRs      Attending MD (Dr. Mac Urbina) :  I reviewed and verified the history and physical exam of the patient and discussed the patient's management with the other clinical providers involved in this patient's care including any involved residents or physicians assistants. I reviewed the above note and agree with the documented findings and plan of care, which were communicated to the patient.      MD Claudia Peguero PA-C    Respectfully,  Mac Urbina MD  Spine Surgery  [facility]      Answers for HPI/ROS submitted by the patient on 9/28/2019   General Symptoms: No  Skin Symptoms: Yes  HENT Symptoms: No  EYE SYMPTOMS: No  HEART SYMPTOMS: No  LUNG SYMPTOMS: No  INTESTINAL SYMPTOMS: No  URINARY SYMPTOMS: No  GYNECOLOGIC SYMPTOMS: No  BREAST SYMPTOMS: No  SKELETAL SYMPTOMS: No  BLOOD SYMPTOMS: No  NERVOUS SYSTEM SYMPTOMS: No  MENTAL HEALTH SYMPTOMS: No  Changes in hair: No  Changes in moles/birth marks: No  Itching: Yes  Rashes: Yes  Changes in nails: No  Acne: No  Hair in places you don't want it: No  Change in facial hair: No  Warts: No  Non-healing sores: No  Scarring: No  Flaking of skin: No  Color changes of hands/feet in cold : No  Sun sensitivity: No  Skin thickening: No

## 2019-10-03 ENCOUNTER — THERAPY VISIT (OUTPATIENT)
Dept: PHYSICAL THERAPY | Facility: CLINIC | Age: 59
End: 2019-10-03
Payer: COMMERCIAL

## 2019-10-03 DIAGNOSIS — M53.3 SACROILIAC DYSFUNCTION: ICD-10-CM

## 2019-10-03 DIAGNOSIS — R26.9 ABNORMAL GAIT: ICD-10-CM

## 2019-10-03 DIAGNOSIS — G89.18 ACUTE POST-OPERATIVE PAIN: ICD-10-CM

## 2019-10-03 PROCEDURE — 97140 MANUAL THERAPY 1/> REGIONS: CPT | Mod: GP | Performed by: PHYSICAL THERAPIST

## 2019-10-03 PROCEDURE — 97530 THERAPEUTIC ACTIVITIES: CPT | Mod: GP | Performed by: PHYSICAL THERAPIST

## 2019-10-03 PROCEDURE — 97112 NEUROMUSCULAR REEDUCATION: CPT | Mod: GP | Performed by: PHYSICAL THERAPIST

## 2019-10-09 ENCOUNTER — THERAPY VISIT (OUTPATIENT)
Dept: PHYSICAL THERAPY | Facility: CLINIC | Age: 59
End: 2019-10-09
Payer: COMMERCIAL

## 2019-10-09 DIAGNOSIS — G89.18 ACUTE POST-OPERATIVE PAIN: ICD-10-CM

## 2019-10-09 DIAGNOSIS — R26.9 ABNORMAL GAIT: ICD-10-CM

## 2019-10-09 DIAGNOSIS — M53.3 SACROILIAC DYSFUNCTION: ICD-10-CM

## 2019-10-09 PROCEDURE — 97140 MANUAL THERAPY 1/> REGIONS: CPT | Mod: GP | Performed by: PHYSICAL THERAPIST

## 2019-10-09 PROCEDURE — 97530 THERAPEUTIC ACTIVITIES: CPT | Mod: GP | Performed by: PHYSICAL THERAPIST

## 2019-10-09 PROCEDURE — 97112 NEUROMUSCULAR REEDUCATION: CPT | Mod: GP | Performed by: PHYSICAL THERAPIST

## 2019-10-14 DIAGNOSIS — M53.3 SI (SACROILIAC) JOINT DYSFUNCTION: Primary | ICD-10-CM

## 2019-10-16 ENCOUNTER — TELEPHONE (OUTPATIENT)
Dept: ORTHOPEDICS | Facility: CLINIC | Age: 59
End: 2019-10-16

## 2019-10-16 ENCOUNTER — THERAPY VISIT (OUTPATIENT)
Dept: PHYSICAL THERAPY | Facility: CLINIC | Age: 59
End: 2019-10-16
Payer: COMMERCIAL

## 2019-10-16 DIAGNOSIS — M53.3 SACROILIAC DYSFUNCTION: ICD-10-CM

## 2019-10-16 DIAGNOSIS — G89.18 ACUTE POST-OPERATIVE PAIN: ICD-10-CM

## 2019-10-16 DIAGNOSIS — R26.9 ABNORMAL GAIT: ICD-10-CM

## 2019-10-16 PROCEDURE — 97110 THERAPEUTIC EXERCISES: CPT | Mod: GP | Performed by: PHYSICAL THERAPIST

## 2019-10-16 PROCEDURE — 97140 MANUAL THERAPY 1/> REGIONS: CPT | Mod: GP | Performed by: PHYSICAL THERAPIST

## 2019-10-16 PROCEDURE — 97112 NEUROMUSCULAR REEDUCATION: CPT | Mod: GP | Performed by: PHYSICAL THERAPIST

## 2019-10-16 NOTE — TELEPHONE ENCOUNTER
[facility] Call Center    Phone Message    May a detailed message be left on voicemail: yes    Reason for Call: Other: Pt's physical therapist at [facility], called in and stated that she had misplaced the protocol. Pt is about 8-10 weeks post op. Rimma would like a new protocl faxed to her as soon as available. Thank you      Action Taken: Message routed to:  [facility] ([facility] Ortho

## 2019-10-31 ENCOUNTER — THERAPY VISIT (OUTPATIENT)
Dept: PHYSICAL THERAPY | Facility: CLINIC | Age: 59
End: 2019-10-31
Payer: COMMERCIAL

## 2019-10-31 DIAGNOSIS — M53.3 SACROILIAC DYSFUNCTION: ICD-10-CM

## 2019-10-31 DIAGNOSIS — R26.9 ABNORMAL GAIT: ICD-10-CM

## 2019-10-31 DIAGNOSIS — G89.18 ACUTE POST-OPERATIVE PAIN: ICD-10-CM

## 2019-10-31 PROCEDURE — 97530 THERAPEUTIC ACTIVITIES: CPT | Mod: GP | Performed by: PHYSICAL THERAPIST

## 2019-10-31 PROCEDURE — 97110 THERAPEUTIC EXERCISES: CPT | Mod: GP | Performed by: PHYSICAL THERAPIST

## 2019-10-31 PROCEDURE — 97112 NEUROMUSCULAR REEDUCATION: CPT | Mod: GP | Performed by: PHYSICAL THERAPIST

## 2019-11-07 ENCOUNTER — THERAPY VISIT (OUTPATIENT)
Dept: PHYSICAL THERAPY | Facility: CLINIC | Age: 59
End: 2019-11-07
Payer: COMMERCIAL

## 2019-11-07 DIAGNOSIS — M53.3 SACROILIAC DYSFUNCTION: ICD-10-CM

## 2019-11-07 DIAGNOSIS — G89.18 ACUTE POST-OPERATIVE PAIN: ICD-10-CM

## 2019-11-07 DIAGNOSIS — R26.9 ABNORMAL GAIT: ICD-10-CM

## 2019-11-07 PROCEDURE — 97110 THERAPEUTIC EXERCISES: CPT | Mod: GP | Performed by: PHYSICAL THERAPIST

## 2019-11-07 PROCEDURE — 97530 THERAPEUTIC ACTIVITIES: CPT | Mod: GP | Performed by: PHYSICAL THERAPIST

## 2019-11-07 PROCEDURE — 97112 NEUROMUSCULAR REEDUCATION: CPT | Mod: GP | Performed by: PHYSICAL THERAPIST

## 2019-11-14 ENCOUNTER — THERAPY VISIT (OUTPATIENT)
Dept: PHYSICAL THERAPY | Facility: CLINIC | Age: 59
End: 2019-11-14
Payer: COMMERCIAL

## 2019-11-14 DIAGNOSIS — M53.3 SACROILIAC DYSFUNCTION: ICD-10-CM

## 2019-11-14 DIAGNOSIS — G89.18 ACUTE POST-OPERATIVE PAIN: ICD-10-CM

## 2019-11-14 DIAGNOSIS — R26.9 ABNORMAL GAIT: ICD-10-CM

## 2019-11-14 PROCEDURE — 97110 THERAPEUTIC EXERCISES: CPT | Mod: GP | Performed by: PHYSICAL THERAPIST

## 2019-11-14 PROCEDURE — 97010 HOT OR COLD PACKS THERAPY: CPT | Mod: GP | Performed by: PHYSICAL THERAPIST

## 2019-11-14 PROCEDURE — 97035 APP MDLTY 1+ULTRASOUND EA 15: CPT | Mod: GP | Performed by: PHYSICAL THERAPIST

## 2019-11-14 PROCEDURE — 97140 MANUAL THERAPY 1/> REGIONS: CPT | Mod: GP | Performed by: PHYSICAL THERAPIST

## 2019-11-22 ASSESSMENT — ENCOUNTER SYMPTOMS
STIFFNESS: 1
MUSCLE WEAKNESS: 0
MUSCLE CRAMPS: 0
JOINT SWELLING: 0
NECK PAIN: 0
BACK PAIN: 1
ARTHRALGIAS: 1
MYALGIAS: 1

## 2019-11-25 ENCOUNTER — THERAPY VISIT (OUTPATIENT)
Dept: PHYSICAL THERAPY | Facility: CLINIC | Age: 59
End: 2019-11-25
Payer: COMMERCIAL

## 2019-11-25 DIAGNOSIS — G89.18 ACUTE POST-OPERATIVE PAIN: ICD-10-CM

## 2019-11-25 DIAGNOSIS — R26.9 ABNORMAL GAIT: ICD-10-CM

## 2019-11-25 DIAGNOSIS — M53.3 CHRONIC SI JOINT PAIN: Primary | ICD-10-CM

## 2019-11-25 DIAGNOSIS — G89.29 CHRONIC SI JOINT PAIN: Primary | ICD-10-CM

## 2019-11-25 DIAGNOSIS — M53.3 SACROILIAC DYSFUNCTION: ICD-10-CM

## 2019-11-25 PROCEDURE — 97035 APP MDLTY 1+ULTRASOUND EA 15: CPT | Mod: GP | Performed by: PHYSICAL THERAPIST

## 2019-11-25 PROCEDURE — 97110 THERAPEUTIC EXERCISES: CPT | Mod: GP | Performed by: PHYSICAL THERAPIST

## 2019-11-25 PROCEDURE — 97140 MANUAL THERAPY 1/> REGIONS: CPT | Mod: GP | Performed by: PHYSICAL THERAPIST

## 2019-11-25 PROCEDURE — 97010 HOT OR COLD PACKS THERAPY: CPT | Mod: GP | Performed by: PHYSICAL THERAPIST

## 2019-11-25 NOTE — PROGRESS NOTES
Subjective:  HPI                    Objective:  System    Physical Exam    General     ROS    Assessment/Plan:    PROGRESS  REPORT    Progress reporting period is from 9-9-19 to 11-25-19.       SUBJECTIVE  Subjective changes noted by patient:  Kristal has begun to experience lateral right hip pain with shooting pain into lateral thigh over past 3-4 weeks. Symptoms are worse during the night and interrupt her sleep. She denies pain with weightbearing or ADLS. She feels the fusion itself has healed well and she no longer experiences the pain in that location.     Current pain level is: 0/10 (night pain = 6/10).     Initial Pain level: 6/10.   Changes in function:  Yes (See Goal flowsheet attached for changes in current functional level)  Adverse reaction to treatment or activity: None    OBJECTIVE  Changes noted in objective findings:   At 14 weeks S/P right SI fusion, Kristal is ambulating in normal gait on level surfaces and stairs. Full trunk ROM in standing w.o reproduction of SI pain. Standing posture reveals elevated right iliac crest and Kristal stands with right knee slightly flexed. Suspect leg length discrepancy with trial of heel lift left LE. Her core strengthening has advanced to level 3. TTP right GTB and suspect bursitis. Last visit trial of US to right GT bursa and STM to ITB.      ASSESSMENT/PLAN  Updated problem list and treatment plan: Diagnosis 1:  S/P Right SI Fusion  Pain -  hot/cold therapy, US, manual therapy, splint/taping/bracing/orthotics, self management, education and home program  Decreased ROM/flexibility - manual therapy, therapeutic exercise and home program  Decreased strength - therapeutic exercise, therapeutic activities and home program  Inflammation - cold therapy, US and self management/home program  Impaired gait - gait training and home program  Impaired muscle performance - neuro re-education and home program  Decreased function - therapeutic activities and home  program  STG/LTGs have been met or progress has been made towards goals:  Yes (See Goal flow sheet completed today.)  Assessment of Progress: The patient's condition is improving.  Patient is meeting short term goals and is progressing towards long term goals.  Self Management Plans:  Patient has been instructed in a home treatment program.  Patient  has been instructed in self management of symptoms.  I have re-evaluated this patient and find that the nature, scope, duration and intensity of the therapy is appropriate for the medical condition of the patient.  Kristal continues to require the following intervention to meet STG and LTG's:  PT    Recommendations:  This patient would benefit from further evaluation.    Please refer to the daily flowsheet for treatment today, total treatment time and time spent performing 1:1 timed codes.

## 2019-11-26 ENCOUNTER — ANCILLARY PROCEDURE (OUTPATIENT)
Dept: GENERAL RADIOLOGY | Facility: CLINIC | Age: 59
End: 2019-11-26
Attending: ORTHOPAEDIC SURGERY
Payer: COMMERCIAL

## 2019-11-26 ENCOUNTER — OFFICE VISIT (OUTPATIENT)
Dept: ORTHOPEDICS | Facility: CLINIC | Age: 59
End: 2019-11-26
Payer: COMMERCIAL

## 2019-11-26 VITALS — HEIGHT: 65 IN | BODY MASS INDEX: 28.16 KG/M2 | WEIGHT: 169 LBS

## 2019-11-26 DIAGNOSIS — M53.3 SI (SACROILIAC) JOINT DYSFUNCTION: ICD-10-CM

## 2019-11-26 DIAGNOSIS — M54.16 LUMBAR RADICULOPATHY: Primary | ICD-10-CM

## 2019-11-26 RX ORDER — METHOCARBAMOL 500 MG/1
500 TABLET, FILM COATED ORAL 4 TIMES DAILY PRN
Qty: 50 TABLET | Refills: 0 | Status: SHIPPED | OUTPATIENT
Start: 2019-11-26 | End: 2019-11-26

## 2019-11-26 RX ORDER — METHOCARBAMOL 500 MG/1
500 TABLET, FILM COATED ORAL
Qty: 30 TABLET | Refills: 0 | Status: SHIPPED | OUTPATIENT
Start: 2019-11-26 | End: 2021-04-02

## 2019-11-26 ASSESSMENT — MIFFLIN-ST. JEOR: SCORE: 1342.46

## 2019-11-26 NOTE — LETTER
2019       RE: Kristal Lester  [address]  [facility] [address]     Dear Colleague,    Thank you for referring your patient, Kristal Lester, to the [facility] at [facility]. Please see a copy of my visit note below.    Spine Surgery Return Clinic Visit      Chief Complaint:   RECHECK (continued pain at surgical site )      Interval HPI:  Symptom Profile Including: location of symptoms, onset, severity, exacerbating/alleviating factors, previous treatments:        Kristal Lester is a 59 year old female who returns today status post right sacroiliac joint fusion.    Overall she does think the surgery is helped her and she has less right-sided low back pain.  She still dealing with some midline low back pain in the setting of known degenerative scoliosis.  She is also dealing with some right greater trochanteric bursitis.  She is been going to physical therapy and working on hip abductor strengthening and core strengthening.  She is interested in increasing her activities.  She notices the back pain most at nighttime it does seem to prevent her from sleeping.            Past Medical History:     Past Medical History:   Diagnosis Date     Benign paroxysmal positional vertigo, left 7/10/2019     HLD (hyperlipidemia)      PVC's (premature ventricular contractions)      SI (sacroiliac) joint dysfunction             Past Surgical History:     Past Surgical History:   Procedure Laterality Date     Bunion surgery  2017      SECTION  5 years ago, 35 years ago     ELBOW SURGERY Right      MAMMOPLASTY AUGMENTATION  23 years ago     OPTICAL TRACKING SYSTEM FUSION SACRAL ILIAC Right 2019    Procedure: Right Minimal Invasive Sacral Iliac Joint Fusion;  Surgeon: Mac Urbina MD;  Location:  OR            Social History:     Social History     Tobacco Use     Smoking status: Never Smoker     Smokeless tobacco: Never Used   Substance  "Use Topics     Alcohol use: Not Currently            Family History:     Family History   Problem Relation Age of Onset     Scleroderma Mother      Chronic Obstructive Pulmonary Disease Father      Heart Disease Father      Hyperlipidemia Father      Alzheimer Disease Maternal Grandmother      Parkinsonism Maternal Grandfather      Mental Illness Brother             Allergies:     Allergies   Allergen Reactions     Bees Shortness Of Breath     Cellulitis of the leg            Medications:     Current Outpatient Medications   Medication     acetaminophen (TYLENOL 8 HOUR) 650 MG CR tablet     diazepam (VALIUM) 5 MG tablet     docusate sodium (COLACE) 100 MG capsule     EPINEPHrine (EPIPEN/ADRENACLICK/OR ANY BX GENERIC EQUIV) 0.3 MG/0.3ML injection 2-pack     estradiol (VIVELLE-DOT) 0.05 MG/24HR bi-weekly patch     HYDROcodone-acetaminophen (LORTAB) 5-325 MG tablet     ondansetron (ZOFRAN-ODT) 4 MG ODT tab     polyethylene glycol (MIRALAX/GLYCOLAX) packet     progesterone (PROMETRIUM) 100 MG capsule     rosuvastatin (CRESTOR) 10 MG tablet     senna-docusate (SENOKOT-S/PERICOLACE) 8.6-50 MG tablet     vitamin D3 (CHOLECALCIFEROL) 2000 units (50 mcg) tablet     No current facility-administered medications for this visit.              Review of Systems:   A focused musculoskeletal and neurologic ROS was performed with pertinent positives and negatives noted in the HPI.  Additional systems were also reviewed and are documented at the bottom of the note.         Physical Exam:   Vitals: Ht 1.651 m (5' 5\")   Wt 76.7 kg (169 lb)   BMI 28.12 kg/m     Musculoskeletal, Neurologic, and Spine:          Lumbar Spine:    Appearance - No gross stepoffs or deformities    Motor -     L2-3: Hip flexion 5/5 R and 5/5 L strength          L3/4:  Knee extension R 5/5 and L 5/5 strength         L4/5:  Foot dorsiflexion R 5/5 L 5/5 and       EHL dorsiflexion R 5/5 L 5/5 strength         S1:  Plantarflexion/Peroneal Muscles  R 5/5 and L 5/5 " strength    Sensation: intact to light touch L3-S1 distribution BLE          Hip Exam:  No pain with hip log roll and no tenderness over the greater trochanters.    Alignment:  Patient stands with a neutral standing sagittal balance.           Imaging:   We ordered and independently reviewed new radiographs at this clinic visit. The results were discussed with the patient. Findings include:     AP and inlet outlet pelvis views today show unchanged position of the implants with no evidence of complication       Assessment and Plan:     59 year old female with good clinical outcome at this point status post SI joint fusion surgery.  I do think her midline low back pain is due to her degenerative scoliosis.  It measures only 20 degrees in the coronal plane I think this is mild enough that she should try to delay surgery as long as possible.  We discussed continued physical therapy and low impact cardio as well as oral medications.  I offered to provide a muscle relaxant which may be somewhat sedating and help her sleep and she could try just taking this at nighttime.  She was interested in this and I did provide a prescription for Robaxin today.  She is going to follow-up with me with repeat pelvis radiographs in 6 months to check on the status of her SI joint fusion.    We also did discuss referral to the pain clinic for her back pain for consideration of facet injections or radiofrequency ablations and if the back pain becomes intolerable she will call and let us know and I could make this referral.           Respectfully,  Mac Urbina MD  Spine Surgery  [facility]

## 2019-11-26 NOTE — PROGRESS NOTES
Spine Surgery Return Clinic Visit      Chief Complaint:   RECHECK (continued pain at surgical site )      Interval HPI:  Symptom Profile Including: location of symptoms, onset, severity, exacerbating/alleviating factors, previous treatments:        Kristal Lester is a 59 year old female who returns today status post right sacroiliac joint fusion.    Overall she does think the surgery is helped her and she has less right-sided low back pain.  She still dealing with some midline low back pain in the setting of known degenerative scoliosis.  She is also dealing with some right greater trochanteric bursitis.  She is been going to physical therapy and working on hip abductor strengthening and core strengthening.  She is interested in increasing her activities.  She notices the back pain most at nighttime it does seem to prevent her from sleeping.            Past Medical History:     Past Medical History:   Diagnosis Date     Benign paroxysmal positional vertigo, left 7/10/2019     HLD (hyperlipidemia)      PVC's (premature ventricular contractions)      SI (sacroiliac) joint dysfunction             Past Surgical History:     Past Surgical History:   Procedure Laterality Date     Bunion surgery  2017      SECTION  5 years ago, 35 years ago     ELBOW SURGERY Right      MAMMOPLASTY AUGMENTATION  23 years ago     OPTICAL TRACKING SYSTEM FUSION SACRAL ILIAC Right 2019    Procedure: Right Minimal Invasive Sacral Iliac Joint Fusion;  Surgeon: Mac Urbina MD;  Location: [facility] OR            Social History:     Social History     Tobacco Use     Smoking status: Never Smoker     Smokeless tobacco: Never Used   Substance Use Topics     Alcohol use: Not Currently            Family History:     Family History   Problem Relation Age of Onset     Scleroderma Mother      Chronic Obstructive Pulmonary Disease Father      Heart Disease Father      Hyperlipidemia Father      Alzheimer Disease Maternal Grandmother  "     Parkinsonism Maternal Grandfather      Mental Illness Brother             Allergies:     Allergies   Allergen Reactions     Bees Shortness Of Breath     Cellulitis of the leg            Medications:     Current Outpatient Medications   Medication     acetaminophen (TYLENOL 8 HOUR) 650 MG CR tablet     diazepam (VALIUM) 5 MG tablet     docusate sodium (COLACE) 100 MG capsule     EPINEPHrine (EPIPEN/ADRENACLICK/OR ANY BX GENERIC EQUIV) 0.3 MG/0.3ML injection 2-pack     estradiol (VIVELLE-DOT) 0.05 MG/24HR bi-weekly patch     HYDROcodone-acetaminophen (LORTAB) 5-325 MG tablet     ondansetron (ZOFRAN-ODT) 4 MG ODT tab     polyethylene glycol (MIRALAX/GLYCOLAX) packet     progesterone (PROMETRIUM) 100 MG capsule     rosuvastatin (CRESTOR) 10 MG tablet     senna-docusate (SENOKOT-S/PERICOLACE) 8.6-50 MG tablet     vitamin D3 (CHOLECALCIFEROL) 2000 units (50 mcg) tablet     No current facility-administered medications for this visit.              Review of Systems:   A focused musculoskeletal and neurologic ROS was performed with pertinent positives and negatives noted in the HPI.  Additional systems were also reviewed and are documented at the bottom of the note.         Physical Exam:   Vitals: Ht 1.651 m (5' 5\")   Wt 76.7 kg (169 lb)   BMI 28.12 kg/m    Musculoskeletal, Neurologic, and Spine:          Lumbar Spine:    Appearance - No gross stepoffs or deformities    Motor -     L2-3: Hip flexion 5/5 R and 5/5 L strength          L3/4:  Knee extension R 5/5 and L 5/5 strength         L4/5:  Foot dorsiflexion R 5/5 L 5/5 and       EHL dorsiflexion R 5/5 L 5/5 strength         S1:  Plantarflexion/Peroneal Muscles  R 5/5 and L 5/5 strength    Sensation: intact to light touch L3-S1 distribution BLE          Hip Exam:  No pain with hip log roll and no tenderness over the greater trochanters.    Alignment:  Patient stands with a neutral standing sagittal balance.           Imaging:   We ordered and independently " reviewed new radiographs at this clinic visit. The results were discussed with the patient. Findings include:     AP and inlet outlet pelvis views today show unchanged position of the implants with no evidence of complication       Assessment and Plan:     59 year old female with good clinical outcome at this point status post SI joint fusion surgery.  I do think her midline low back pain is due to her degenerative scoliosis.  It measures only 20 degrees in the coronal plane I think this is mild enough that she should try to delay surgery as long as possible.  We discussed continued physical therapy and low impact cardio as well as oral medications.  I offered to provide a muscle relaxant which may be somewhat sedating and help her sleep and she could try just taking this at nighttime.  She was interested in this and I did provide a prescription for Robaxin today.  She is going to follow-up with me with repeat pelvis radiographs in 6 months to check on the status of her SI joint fusion.    We also did discuss referral to the pain clinic for her back pain for consideration of facet injections or radiofrequency ablations and if the back pain becomes intolerable she will call and let us know and I could make this referral.           Respectfully,  Mac Urbina MD  Spine Surgery  [facility]    Answers for HPI/ROS submitted by the patient on 11/22/2019   General Symptoms: No  Skin Symptoms: No  HENT Symptoms: No  EYE SYMPTOMS: No  HEART SYMPTOMS: No  LUNG SYMPTOMS: No  INTESTINAL SYMPTOMS: No  URINARY SYMPTOMS: No  GYNECOLOGIC SYMPTOMS: No  BREAST SYMPTOMS: No  SKELETAL SYMPTOMS: Yes  BLOOD SYMPTOMS: No  NERVOUS SYSTEM SYMPTOMS: No  MENTAL HEALTH SYMPTOMS: No  Back pain: Yes  Muscle aches: Yes  Neck pain: No  Swollen joints: No  Joint pain: Yes  Bone pain: Yes  Muscle cramps: No  Muscle weakness: No  Joint stiffness: Yes  Bone fracture: No

## 2019-11-26 NOTE — NURSING NOTE
"Reason For Visit:   Chief Complaint   Patient presents with     RECHECK     continued pain at surgical site        Primary MD: Logan Dias  Ref. MD:   Date of surgery: Dr. Urbina   Type of surgery: Dr. Urbina .  Smoker: No  Request smoking cessation information: No    Ht 1.651 m (5' 5\")   Wt 76.7 kg (169 lb)   BMI 28.12 kg/m           Oswestry (NIKHIL) Questionnaire    OSWESTRY DISABILITY INDEX 11/22/2019   Count 10   Sum 12   Oswestry Score (%) 24            Neck Disability Index (NDI) Questionnaire    No flowsheet data found.                Promis 10 Assessment    PROMIS 10 11/22/2019   In general, would you say your health is: Good   In general, would you say your quality of life is: Good   In general, how would you rate your physical health? Good   In general, how would you rate your mental health, including your mood and your ability to think? Very good   In general, how would you rate your satisfaction with your social activities and relationships? Good   In general, please rate how well you carry out your usual social activities and roles Good   To what extent are you able to carry out your everyday physical activities such as walking, climbing stairs, carrying groceries, or moving a chair? Moderately   How often have you been bothered by emotional problems such as feeling anxious, depressed or irritable? Rarely   How would you rate your fatigue on average? Mild   How would you rate your pain on average?   0 = No Pain  to  10 = Worst Imaginable Pain 3   In general, would you say your health is: 3   In general, would you say your quality of life is: 3   In general, how would you rate your physical health? 3   In general, how would you rate your mental health, including your mood and your ability to think? 4   In general, how would you rate your satisfaction with your social activities and relationships? 3   In general, please rate how well you carry out your usual social activities and roles. (This " includes activities at home, at work and in your community, and responsibilities as a parent, child, spouse, employee, friend, etc.) 3   To what extent are you able to carry out your everyday physical activities such as walking, climbing stairs, carrying groceries, or moving a chair? 3   In the past 7 days, how often have you been bothered by emotional problems such as feeling anxious, depressed, or irritable? 2   In the past 7 days, how would you rate your fatigue on average? 2   In the past 7 days, how would you rate your pain on average, where 0 means no pain, and 10 means worst imaginable pain? 3   Global Mental Health Score 14   Global Physical Health Score 14   PROMIS TOTAL - SUBSCORES 28                Tae Canseco ATC

## 2019-12-12 PROBLEM — G89.18 ACUTE POST-OPERATIVE PAIN: Status: RESOLVED | Noted: 2019-09-09 | Resolved: 2019-12-12

## 2019-12-12 PROBLEM — R26.9 ABNORMAL GAIT: Status: RESOLVED | Noted: 2019-09-16 | Resolved: 2019-12-12

## 2019-12-12 PROBLEM — M53.3 SACROILIAC DYSFUNCTION: Status: RESOLVED | Noted: 2019-09-09 | Resolved: 2019-12-12

## 2020-02-20 ASSESSMENT — ENCOUNTER SYMPTOMS
HALLUCINATIONS: 0
WEIGHT LOSS: 1
WEIGHT GAIN: 0
POLYPHAGIA: 0
NERVOUS/ANXIOUS: 1
FEVER: 0
POLYDIPSIA: 0
DECREASED APPETITE: 1
INCREASED ENERGY: 0
FATIGUE: 1
INSOMNIA: 1
ALTERED TEMPERATURE REGULATION: 0
NIGHT SWEATS: 0
DECREASED CONCENTRATION: 1
CHILLS: 0
PANIC: 1
DEPRESSION: 1

## 2020-02-28 DIAGNOSIS — M53.3 CHRONIC SI JOINT PAIN: ICD-10-CM

## 2020-02-28 DIAGNOSIS — M54.16 LUMBAR RADICULOPATHY: Primary | ICD-10-CM

## 2020-02-28 DIAGNOSIS — G89.29 CHRONIC SI JOINT PAIN: ICD-10-CM

## 2020-03-03 ENCOUNTER — ANCILLARY PROCEDURE (OUTPATIENT)
Dept: GENERAL RADIOLOGY | Facility: CLINIC | Age: 60
End: 2020-03-03
Attending: ORTHOPAEDIC SURGERY
Payer: COMMERCIAL

## 2020-03-03 ENCOUNTER — DOCUMENTATION ONLY (OUTPATIENT)
Dept: CARE COORDINATION | Facility: CLINIC | Age: 60
End: 2020-03-03

## 2020-03-03 ENCOUNTER — OFFICE VISIT (OUTPATIENT)
Dept: ORTHOPEDICS | Facility: CLINIC | Age: 60
End: 2020-03-03
Payer: COMMERCIAL

## 2020-03-03 VITALS — WEIGHT: 168.3 LBS | HEIGHT: 65 IN | BODY MASS INDEX: 28.04 KG/M2

## 2020-03-03 DIAGNOSIS — M53.3 CHRONIC SI JOINT PAIN: ICD-10-CM

## 2020-03-03 DIAGNOSIS — G89.29 CHRONIC SI JOINT PAIN: ICD-10-CM

## 2020-03-03 DIAGNOSIS — M54.16 LUMBAR RADICULOPATHY: Primary | ICD-10-CM

## 2020-03-03 ASSESSMENT — MIFFLIN-ST. JEOR: SCORE: 1339.28

## 2020-03-03 NOTE — LETTER
3/3/2020       RE: Kristal Lester  [address]  [facility] [address]     Dear Colleague,    Thank you for referring your patient, Kristal Lester, to the [facility] at [facility]. Please see a copy of my visit note below.    Spine Surgery Return Clinic Visit      Chief Complaint:   RECHECK (6 month follow up after surgery )      Interval HPI:  Symptom Profile Including: location of symptoms, onset, severity, exacerbating/alleviating factors, previous treatments:        Kristal Lester is a 59 year old female who is status post right sacroiliac joint fusion.  She is very grateful.  She says she is done really well with the operation.  She is so grateful she asks if she can be listed for other patients to contact so she can tell him how wonderful the procedure was.  She does have some residual low back pain and she notes that not everything is completely gone but she is much better than she was and she has returned to work.            Past Medical History:     Past Medical History:   Diagnosis Date     Benign paroxysmal positional vertigo, left 7/10/2019     HLD (hyperlipidemia)      PVC's (premature ventricular contractions)      SI (sacroiliac) joint dysfunction             Past Surgical History:     Past Surgical History:   Procedure Laterality Date     Bunion surgery  2017      SECTION  5 years ago, 35 years ago     ELBOW SURGERY Right      MAMMOPLASTY AUGMENTATION  23 years ago     OPTICAL TRACKING SYSTEM FUSION SACRAL ILIAC Right 2019    Procedure: Right Minimal Invasive Sacral Iliac Joint Fusion;  Surgeon: Mac Urbina MD;  Location:  OR            Social History:     Social History     Tobacco Use     Smoking status: Never Smoker     Smokeless tobacco: Never Used   Substance Use Topics     Alcohol use: Not Currently            Family History:     Family History   Problem Relation Age of Onset     Scleroderma Mother       "Chronic Obstructive Pulmonary Disease Father      Heart Disease Father      Hyperlipidemia Father      Alzheimer Disease Maternal Grandmother      Parkinsonism Maternal Grandfather      Mental Illness Brother             Allergies:     Allergies   Allergen Reactions     Bees Shortness Of Breath     Cellulitis of the leg            Medications:     Current Outpatient Medications   Medication     acetaminophen (TYLENOL 8 HOUR) 650 MG CR tablet     diazepam (VALIUM) 5 MG tablet     docusate sodium (COLACE) 100 MG capsule     EPINEPHrine (EPIPEN/ADRENACLICK/OR ANY BX GENERIC EQUIV) 0.3 MG/0.3ML injection 2-pack     estradiol (VIVELLE-DOT) 0.05 MG/24HR bi-weekly patch     HYDROcodone-acetaminophen (LORTAB) 5-325 MG tablet     methocarbamol (ROBAXIN) 500 MG tablet     ondansetron (ZOFRAN-ODT) 4 MG ODT tab     polyethylene glycol (MIRALAX/GLYCOLAX) packet     progesterone (PROMETRIUM) 100 MG capsule     rosuvastatin (CRESTOR) 10 MG tablet     senna-docusate (SENOKOT-S/PERICOLACE) 8.6-50 MG tablet     vitamin D3 (CHOLECALCIFEROL) 2000 units (50 mcg) tablet     No current facility-administered medications for this visit.              Review of Systems:   A focused musculoskeletal and neurologic ROS was performed with pertinent positives and negatives noted in the HPI.  Additional systems were also reviewed and are documented at the bottom of the note.         Physical Exam:   Vitals: Ht 1.651 m (5' 5\")   Wt 76.3 kg (168 lb 4.8 oz)   BMI 28.01 kg/m     Musculoskeletal, Neurologic, and Spine:            Lumbar Spine:    Appearance - No gross stepoffs or deformities    Motor -     L2-3: Hip flexion 5/5 R and 5/5 L strength          L3/4:  Knee extension R 5/5 and L 5/5 strength         L4/5:  Foot dorsiflexion R 5/5 L 5/5 and       EHL dorsiflexion R 4/5 L 4/5 strength         S1:  Plantarflexion/Peroneal Muscles  R 5/5 and L 5/5 strength    Sensation: intact to light touch L3-S1 distribution BLE               Imaging:   We " ordered and independently reviewed new radiographs at this clinic visit. The results were discussed with the patient. Findings include:     Imaging today shows unchanged position of her implants       Assessment and Plan:     59 year old female with good clinical result status post right sacroiliac joint fusion.    I told her it takes about a year for things to solidify.  I would like her to come back in 6 months for a CT scan of the pelvis to assess the fusion status.     Respectfully,  Mac Urbina MD  Spine Surgery  [facility]    Answers for HPI/ROS submitted by the patient on 2/20/2020   General Symptoms: Yes  Skin Symptoms: No  HENT Symptoms: No  EYE SYMPTOMS: No  HEART SYMPTOMS: No  LUNG SYMPTOMS: No  INTESTINAL SYMPTOMS: No  URINARY SYMPTOMS: No  GYNECOLOGIC SYMPTOMS: No  BREAST SYMPTOMS: No  SKELETAL SYMPTOMS: No  BLOOD SYMPTOMS: No  NERVOUS SYSTEM SYMPTOMS: No  MENTAL HEALTH SYMPTOMS: Yes  Fever: No  Loss of appetite: Yes  Weight loss: Yes  Weight gain: No  Fatigue: Yes  Night sweats: No  Chills: No  Increased stress: Yes  Excessive hunger: No  Excessive thirst: No  Feeling hot or cold when others believe the temperature is normal: No  Loss of height: No  Post-operative complications: No  Surgical site pain: No  Hallucinations: No  Change in or Loss of Energy: No  Hyperactivity: No  Confusion: No  Nervous or Anxious: Yes  Depression: Yes  Trouble sleeping: Yes  Trouble thinking or concentrating: Yes  Mood changes: Yes  Panic attacks: Yes

## 2020-03-03 NOTE — NURSING NOTE
"Reason For Visit:   Chief Complaint   Patient presents with     RECHECK     6 month follow up after surgery        Primary MD: Logan Dias  Ref. MD: Self   Date of surgery: Dr. Urbina   Type of surgery: Dr. Urbina .  Smoker: No  Request smoking cessation information: No    Ht 1.651 m (5' 5\")   Wt 76.3 kg (168 lb 4.8 oz)   BMI 28.01 kg/m           Oswestry (NIKHIL) Questionnaire    OSWESTRY DISABILITY INDEX 2/20/2020   Count 8   Sum 2   Oswestry Score (%) 5            Neck Disability Index (NDI) Questionnaire    No flowsheet data found.                Promis 10 Assessment    PROMIS 10 2/20/2020   In general, would you say your health is: Very good   In general, would you say your quality of life is: Fair   In general, how would you rate your physical health? Very good   In general, how would you rate your mental health, including your mood and your ability to think? Poor   In general, how would you rate your satisfaction with your social activities and relationships? Poor   In general, please rate how well you carry out your usual social activities and roles Fair   To what extent are you able to carry out your everyday physical activities such as walking, climbing stairs, carrying groceries, or moving a chair? Mostly   How often have you been bothered by emotional problems such as feeling anxious, depressed or irritable? Often   How would you rate your fatigue on average? Moderate   How would you rate your pain on average?   0 = No Pain  to  10 = Worst Imaginable Pain 2   In general, would you say your health is: 4   In general, would you say your quality of life is: 2   In general, how would you rate your physical health? 4   In general, how would you rate your mental health, including your mood and your ability to think? 1   In general, how would you rate your satisfaction with your social activities and relationships? 1   In general, please rate how well you carry out your usual social activities and roles. " (This includes activities at home, at work and in your community, and responsibilities as a parent, child, spouse, employee, friend, etc.) 2   To what extent are you able to carry out your everyday physical activities such as walking, climbing stairs, carrying groceries, or moving a chair? 4   In the past 7 days, how often have you been bothered by emotional problems such as feeling anxious, depressed, or irritable? 4   In the past 7 days, how would you rate your fatigue on average? 3   In the past 7 days, how would you rate your pain on average, where 0 means no pain, and 10 means worst imaginable pain? 2   Global Mental Health Score 6   Global Physical Health Score 15   PROMIS TOTAL - SUBSCORES 21                Tae Canseco, ATC

## 2020-03-03 NOTE — PROGRESS NOTES
Spine Surgery Return Clinic Visit      Chief Complaint:   RECHECK (6 month follow up after surgery )      Interval HPI:  Symptom Profile Including: location of symptoms, onset, severity, exacerbating/alleviating factors, previous treatments:        Kristal Lester is a 59 year old female who is status post right sacroiliac joint fusion.  She is very grateful.  She says she is done really well with the operation.  She is so grateful she asks if she can be listed for other patients to contact so she can tell him how wonderful the procedure was.  She does have some residual low back pain and she notes that not everything is completely gone but she is much better than she was and she has returned to work.            Past Medical History:     Past Medical History:   Diagnosis Date     Benign paroxysmal positional vertigo, left 7/10/2019     HLD (hyperlipidemia)      PVC's (premature ventricular contractions)      SI (sacroiliac) joint dysfunction             Past Surgical History:     Past Surgical History:   Procedure Laterality Date     Bunion surgery  2017      SECTION  5 years ago, 35 years ago     ELBOW SURGERY Right      MAMMOPLASTY AUGMENTATION  23 years ago     OPTICAL TRACKING SYSTEM FUSION SACRAL ILIAC Right 2019    Procedure: Right Minimal Invasive Sacral Iliac Joint Fusion;  Surgeon: Mac Urbina MD;  Location: [facility]            Social History:     Social History     Tobacco Use     Smoking status: Never Smoker     Smokeless tobacco: Never Used   Substance Use Topics     Alcohol use: Not Currently            Family History:     Family History   Problem Relation Age of Onset     Scleroderma Mother      Chronic Obstructive Pulmonary Disease Father      Heart Disease Father      Hyperlipidemia Father      Alzheimer Disease Maternal Grandmother      Parkinsonism Maternal Grandfather      Mental Illness Brother             Allergies:     Allergies   Allergen Reactions     Bees Shortness  "Of Breath     Cellulitis of the leg            Medications:     Current Outpatient Medications   Medication     acetaminophen (TYLENOL 8 HOUR) 650 MG CR tablet     diazepam (VALIUM) 5 MG tablet     docusate sodium (COLACE) 100 MG capsule     EPINEPHrine (EPIPEN/ADRENACLICK/OR ANY BX GENERIC EQUIV) 0.3 MG/0.3ML injection 2-pack     estradiol (VIVELLE-DOT) 0.05 MG/24HR bi-weekly patch     HYDROcodone-acetaminophen (LORTAB) 5-325 MG tablet     methocarbamol (ROBAXIN) 500 MG tablet     ondansetron (ZOFRAN-ODT) 4 MG ODT tab     polyethylene glycol (MIRALAX/GLYCOLAX) packet     progesterone (PROMETRIUM) 100 MG capsule     rosuvastatin (CRESTOR) 10 MG tablet     senna-docusate (SENOKOT-S/PERICOLACE) 8.6-50 MG tablet     vitamin D3 (CHOLECALCIFEROL) 2000 units (50 mcg) tablet     No current facility-administered medications for this visit.              Review of Systems:   A focused musculoskeletal and neurologic ROS was performed with pertinent positives and negatives noted in the HPI.  Additional systems were also reviewed and are documented at the bottom of the note.         Physical Exam:   Vitals: Ht 1.651 m (5' 5\")   Wt 76.3 kg (168 lb 4.8 oz)   BMI 28.01 kg/m    Musculoskeletal, Neurologic, and Spine:            Lumbar Spine:    Appearance - No gross stepoffs or deformities    Motor -     L2-3: Hip flexion 5/5 R and 5/5 L strength          L3/4:  Knee extension R 5/5 and L 5/5 strength         L4/5:  Foot dorsiflexion R 5/5 L 5/5 and       EHL dorsiflexion R 4/5 L 4/5 strength         S1:  Plantarflexion/Peroneal Muscles  R 5/5 and L 5/5 strength    Sensation: intact to light touch L3-S1 distribution BLE               Imaging:   We ordered and independently reviewed new radiographs at this clinic visit. The results were discussed with the patient. Findings include:     Imaging today shows unchanged position of her implants       Assessment and Plan:     59 year old female with good clinical result status post right " sacroiliac joint fusion.    I told her it takes about a year for things to solidify.  I would like her to come back in 6 months for a CT scan of the pelvis to assess the fusion status.           Respectfully,  Mac Urbina MD  Spine Surgery  [facility]    Answers for HPI/ROS submitted by the patient on 2/20/2020   General Symptoms: Yes  Skin Symptoms: No  HENT Symptoms: No  EYE SYMPTOMS: No  HEART SYMPTOMS: No  LUNG SYMPTOMS: No  INTESTINAL SYMPTOMS: No  URINARY SYMPTOMS: No  GYNECOLOGIC SYMPTOMS: No  BREAST SYMPTOMS: No  SKELETAL SYMPTOMS: No  BLOOD SYMPTOMS: No  NERVOUS SYSTEM SYMPTOMS: No  MENTAL HEALTH SYMPTOMS: Yes  Fever: No  Loss of appetite: Yes  Weight loss: Yes  Weight gain: No  Fatigue: Yes  Night sweats: No  Chills: No  Increased stress: Yes  Excessive hunger: No  Excessive thirst: No  Feeling hot or cold when others believe the temperature is normal: No  Loss of height: No  Post-operative complications: No  Surgical site pain: No  Hallucinations: No  Change in or Loss of Energy: No  Hyperactivity: No  Confusion: No  Nervous or Anxious: Yes  Depression: Yes  Trouble sleeping: Yes  Trouble thinking or concentrating: Yes  Mood changes: Yes  Panic attacks: Yes

## 2020-03-10 ENCOUNTER — HEALTH MAINTENANCE LETTER (OUTPATIENT)
Age: 60
End: 2020-03-10

## 2020-06-03 ASSESSMENT — ENCOUNTER SYMPTOMS
PANIC: 0
TINGLING: 0
SPEECH CHANGE: 0
INSOMNIA: 1
NERVOUS/ANXIOUS: 1
MEMORY LOSS: 0
DIZZINESS: 0
SEIZURES: 0
DECREASED CONCENTRATION: 1
NUMBNESS: 0
DEPRESSION: 1
HEADACHES: 0
DISTURBANCES IN COORDINATION: 1
WEAKNESS: 0
LOSS OF CONSCIOUSNESS: 0
PARALYSIS: 0
TREMORS: 0

## 2020-06-05 ENCOUNTER — ANCILLARY PROCEDURE (OUTPATIENT)
Dept: GENERAL RADIOLOGY | Facility: CLINIC | Age: 60
End: 2020-06-05
Attending: ORTHOPAEDIC SURGERY
Payer: COMMERCIAL

## 2020-06-05 ENCOUNTER — OFFICE VISIT (OUTPATIENT)
Dept: ORTHOPEDICS | Facility: CLINIC | Age: 60
End: 2020-06-05
Payer: COMMERCIAL

## 2020-06-05 DIAGNOSIS — M53.3 CHRONIC SI JOINT PAIN: Primary | ICD-10-CM

## 2020-06-05 DIAGNOSIS — G89.29 CHRONIC SI JOINT PAIN: Primary | ICD-10-CM

## 2020-06-05 DIAGNOSIS — M53.3 SI (SACROILIAC) JOINT DYSFUNCTION: Primary | ICD-10-CM

## 2020-06-05 NOTE — LETTER
2020     RE: Kristal Lester  [address]  [place] [facility] [address]    Dear Colleague,    Thank you for referring your patient, Kristal Lester, to the [facility]. Please see a copy of my visit note below.    Spine Surgery Return Clinic Visit      Chief Complaint:   RECHECK (3 month follow up patient having increased pain after picking up a large bag of cement )      Interval HPI:  Symptom Profile Including: location of symptoms, onset, severity, exacerbating/alleviating factors, previous treatments:        Kristal Lester is a 59 year old female who returns today roughly 8 months status post right minimally invasive sacroiliac joint fusion.  I last saw her in March.  At that time she was really doing quite well.  She is returned to work full-time.  However she was lifting a bag of cement about a month ago when she felt a pop in her right side and she had a sudden return of pain.  She thinks it is different than the previous sacroiliac joint pain she had.  It is a tightness in the muscles in that area are very tender.  It is right around the lumbosacral junction and then more to the right side as well as somewhat over the right posterior superior iliac spine.  It is particularly severe with sitting.  She is tried some anti-inflammatories.  She also took 1 pill of oxycodone and this made her feel nauseous.            Past Medical History:     Past Medical History:   Diagnosis Date     Benign paroxysmal positional vertigo, left 7/10/2019     HLD (hyperlipidemia)      PVC's (premature ventricular contractions)      SI (sacroiliac) joint dysfunction             Past Surgical History:     Past Surgical History:   Procedure Laterality Date     Bunion surgery  2017      SECTION  5 years ago, 35 years ago     ELBOW SURGERY Right      MAMMOPLASTY AUGMENTATION  23 years ago     OPTICAL TRACKING SYSTEM FUSION SACRAL ILIAC Right 2019    Procedure: Right Minimal Invasive Sacral Iliac Joint  Fusion;  Surgeon: Mac Urbina MD;  Location: [facility] OR            Social History:     Social History     Tobacco Use     Smoking status: Never Smoker     Smokeless tobacco: Never Used   Substance Use Topics     Alcohol use: Not Currently            Family History:     Family History   Problem Relation Age of Onset     Scleroderma Mother      Chronic Obstructive Pulmonary Disease Father      Heart Disease Father      Hyperlipidemia Father      Alzheimer Disease Maternal Grandmother      Parkinsonism Maternal Grandfather      Mental Illness Brother             Allergies:     Allergies   Allergen Reactions     Bees Shortness Of Breath     Cellulitis of the leg            Medications:     Current Outpatient Medications   Medication     acetaminophen (TYLENOL 8 HOUR) 650 MG CR tablet     diazepam (VALIUM) 5 MG tablet     docusate sodium (COLACE) 100 MG capsule     EPINEPHrine (EPIPEN/ADRENACLICK/OR ANY BX GENERIC EQUIV) 0.3 MG/0.3ML injection 2-pack     estradiol (VIVELLE-DOT) 0.05 MG/24HR bi-weekly patch     HYDROcodone-acetaminophen (LORTAB) 5-325 MG tablet     methocarbamol (ROBAXIN) 500 MG tablet     ondansetron (ZOFRAN-ODT) 4 MG ODT tab     polyethylene glycol (MIRALAX/GLYCOLAX) packet     progesterone (PROMETRIUM) 100 MG capsule     rosuvastatin (CRESTOR) 10 MG tablet     senna-docusate (SENOKOT-S/PERICOLACE) 8.6-50 MG tablet     vitamin D3 (CHOLECALCIFEROL) 2000 units (50 mcg) tablet     No current facility-administered medications for this visit.              Review of Systems:   A focused musculoskeletal and neurologic ROS was performed with pertinent positives and negatives noted in the HPI.  Additional systems were also reviewed and are documented at the bottom of the note.         Physical Exam:   Vitals: There were no vitals taken for this visit.  Musculoskeletal, Neurologic, and Spine:            Lumbar Spine:    Appearance - No gross stepoffs or deformities    Motor -     L2-3: Hip flexion 5/5  R and 5/5 L strength          L3/4:  Knee extension R 5/5 and L 5/5 strength         L4/5:  Foot dorsiflexion R 5/5 L 5/5 and       EHL dorsiflexion R 4/5 L 4/5 strength         S1:  Plantarflexion/Peroneal Muscles  R 5/5 and L 5/5 strength    Sensation: intact to light touch L3-S1 distribution BLE          Hip Exam:  No pain with hip log roll.    Alignment:  Patient stands with a neutral standing sagittal balance.    I also examined the sacroiliac joint.  She has a positive Nikki, positive pelvic compression and positive lateral compression as well as positive Gaenslen's.  She is tender over the right greater trochanter as well.           Imaging:   We ordered and independently reviewed new radiographs at this clinic visit. The results were discussed with the patient. Findings include:     3 view pelvic radiographs were reviewed today.  These do show a radiolucent line around the superiormost implant in the right sacroiliac joint.  However there is not really much change from the March films.       Assessment and Plan:     59 year old female with return of right sided sacroiliac joint symptoms about 8 months status post attempted minimally invasive fusion.    I told her there is 2 possibilities, one being a muscle strain and also the fact that she does have some lumbar spondylosis.  The other perhaps more concerning possibility would be a nonunion of her surgery.  I would like her to get a CT scan of the pelvis.  In addition I recommended a CT-guided right sacroiliac joint injection and I asked her to keep a pain diary.  I am going to give her an SI joint belt, and also refer her for lumbar physical therapy.  I will follow-up with her a few weeks after the injection and review the imaging with her at that time.     Respectfully,  Mac Urbina MD  Spine Surgery  [facility]    Answers for HPI/ROS submitted by the patient on 6/3/2020   General Symptoms: No  Skin Symptoms: No  HENT Symptoms:  No  EYE SYMPTOMS: No  HEART SYMPTOMS: No  LUNG SYMPTOMS: No  INTESTINAL SYMPTOMS: No  URINARY SYMPTOMS: No  GYNECOLOGIC SYMPTOMS: No  BREAST SYMPTOMS: No  SKELETAL SYMPTOMS: Yes  BLOOD SYMPTOMS: No  NERVOUS SYSTEM SYMPTOMS: Yes  MENTAL HEALTH SYMPTOMS: Yes  Trouble with coordination: Yes  Dizziness or trouble with balance: No  Fainting or black-out spells: No  Memory loss: No  Headache: No  Seizures: No  Speech problems: No  Tingling: No  Tremor: No  Weakness: No  Difficulty walking: Yes  Paralysis: No  Numbness: No  Nervous or Anxious: Yes  Depression: Yes  Trouble sleeping: Yes  Trouble thinking or concentrating: Yes  Mood changes: Yes  Panic attacks: No    Again, thank you for allowing me to participate in the care of your patient.      Sincerely,      Mac Urbina MD

## 2020-06-05 NOTE — PROGRESS NOTES
Spine Surgery Return Clinic Visit      Chief Complaint:   RECHECK (3 month follow up patient having increased pain after picking up a large bag of cement )      Interval HPI:  Symptom Profile Including: location of symptoms, onset, severity, exacerbating/alleviating factors, previous treatments:        Kristal Lester is a 59 year old female who returns today roughly 8 months status post right minimally invasive sacroiliac joint fusion.  I last saw her in March.  At that time she was really doing quite well.  She is returned to work full-time.  However she was lifting a bag of cement about a month ago when she felt a pop in her right side and she had a sudden return of pain.  She thinks it is different than the previous sacroiliac joint pain she had.  It is a tightness in the muscles in that area are very tender.  It is right around the lumbosacral junction and then more to the right side as well as somewhat over the right posterior superior iliac spine.  It is particularly severe with sitting.  She is tried some anti-inflammatories.  She also took 1 pill of oxycodone and this made her feel nauseous.            Past Medical History:     Past Medical History:   Diagnosis Date     Benign paroxysmal positional vertigo, left 7/10/2019     HLD (hyperlipidemia)      PVC's (premature ventricular contractions)      SI (sacroiliac) joint dysfunction             Past Surgical History:     Past Surgical History:   Procedure Laterality Date     Bunion surgery  2017      SECTION  5 years ago, 35 years ago     ELBOW SURGERY Right      MAMMOPLASTY AUGMENTATION  23 years ago     OPTICAL TRACKING SYSTEM FUSION SACRAL ILIAC Right 2019    Procedure: Right Minimal Invasive Sacral Iliac Joint Fusion;  Surgeon: Mac Urbina MD;  Location:  OR            Social History:     Social History     Tobacco Use     Smoking status: Never Smoker     Smokeless tobacco: Never Used   Substance Use Topics     Alcohol  use: Not Currently            Family History:     Family History   Problem Relation Age of Onset     Scleroderma Mother      Chronic Obstructive Pulmonary Disease Father      Heart Disease Father      Hyperlipidemia Father      Alzheimer Disease Maternal Grandmother      Parkinsonism Maternal Grandfather      Mental Illness Brother             Allergies:     Allergies   Allergen Reactions     Bees Shortness Of Breath     Cellulitis of the leg            Medications:     Current Outpatient Medications   Medication     acetaminophen (TYLENOL 8 HOUR) 650 MG CR tablet     diazepam (VALIUM) 5 MG tablet     docusate sodium (COLACE) 100 MG capsule     EPINEPHrine (EPIPEN/ADRENACLICK/OR ANY BX GENERIC EQUIV) 0.3 MG/0.3ML injection 2-pack     estradiol (VIVELLE-DOT) 0.05 MG/24HR bi-weekly patch     HYDROcodone-acetaminophen (LORTAB) 5-325 MG tablet     methocarbamol (ROBAXIN) 500 MG tablet     ondansetron (ZOFRAN-ODT) 4 MG ODT tab     polyethylene glycol (MIRALAX/GLYCOLAX) packet     progesterone (PROMETRIUM) 100 MG capsule     rosuvastatin (CRESTOR) 10 MG tablet     senna-docusate (SENOKOT-S/PERICOLACE) 8.6-50 MG tablet     vitamin D3 (CHOLECALCIFEROL) 2000 units (50 mcg) tablet     No current facility-administered medications for this visit.              Review of Systems:   A focused musculoskeletal and neurologic ROS was performed with pertinent positives and negatives noted in the HPI.  Additional systems were also reviewed and are documented at the bottom of the note.         Physical Exam:   Vitals: There were no vitals taken for this visit.  Musculoskeletal, Neurologic, and Spine:            Lumbar Spine:    Appearance - No gross stepoffs or deformities    Motor -     L2-3: Hip flexion 5/5 R and 5/5 L strength          L3/4:  Knee extension R 5/5 and L 5/5 strength         L4/5:  Foot dorsiflexion R 5/5 L 5/5 and       EHL dorsiflexion R 4/5 L 4/5 strength         S1:  Plantarflexion/Peroneal Muscles  R 5/5 and L 5/5  strength    Sensation: intact to light touch L3-S1 distribution BLE          Hip Exam:  No pain with hip log roll.    Alignment:  Patient stands with a neutral standing sagittal balance.    I also examined the sacroiliac joint.  She has a positive Nikki, positive pelvic compression and positive lateral compression as well as positive Gaenslen's.  She is tender over the right greater trochanter as well.           Imaging:   We ordered and independently reviewed new radiographs at this clinic visit. The results were discussed with the patient. Findings include:     3 view pelvic radiographs were reviewed today.  These do show a radiolucent line around the superiormost implant in the right sacroiliac joint.  However there is not really much change from the March films.       Assessment and Plan:     59 year old female with return of right sided sacroiliac joint symptoms about 8 months status post attempted minimally invasive fusion.    I told her there is 2 possibilities, one being a muscle strain and also the fact that she does have some lumbar spondylosis.  The other perhaps more concerning possibility would be a nonunion of her surgery.  I would like her to get a CT scan of the pelvis.  In addition I recommended a CT-guided right sacroiliac joint injection and I asked her to keep a pain diary.  I am going to give her an SI joint belt, and also refer her for lumbar physical therapy.  I will follow-up with her a few weeks after the injection and review the imaging with her at that time.     Respectfully,  Mac Urbina MD  Spine Surgery  [facility]    Answers for HPI/ROS submitted by the patient on 6/3/2020   General Symptoms: No  Skin Symptoms: No  HENT Symptoms: No  EYE SYMPTOMS: No  HEART SYMPTOMS: No  LUNG SYMPTOMS: No  INTESTINAL SYMPTOMS: No  URINARY SYMPTOMS: No  GYNECOLOGIC SYMPTOMS: No  BREAST SYMPTOMS: No  SKELETAL SYMPTOMS: Yes  BLOOD SYMPTOMS: No  NERVOUS SYSTEM SYMPTOMS: Yes  MENTAL  HEALTH SYMPTOMS: Yes  Trouble with coordination: Yes  Dizziness or trouble with balance: No  Fainting or black-out spells: No  Memory loss: No  Headache: No  Seizures: No  Speech problems: No  Tingling: No  Tremor: No  Weakness: No  Difficulty walking: Yes  Paralysis: No  Numbness: No  Nervous or Anxious: Yes  Depression: Yes  Trouble sleeping: Yes  Trouble thinking or concentrating: Yes  Mood changes: Yes  Panic attacks: No

## 2020-06-08 DIAGNOSIS — M53.3 SI (SACROILIAC) JOINT DYSFUNCTION: Primary | ICD-10-CM

## 2020-08-03 ENCOUNTER — THERAPY VISIT (OUTPATIENT)
Dept: PHYSICAL THERAPY | Facility: CLINIC | Age: 60
End: 2020-08-03
Payer: COMMERCIAL

## 2020-08-03 DIAGNOSIS — M53.3 CHRONIC SI JOINT PAIN: ICD-10-CM

## 2020-08-03 DIAGNOSIS — M54.41 ACUTE BILATERAL LOW BACK PAIN WITH RIGHT-SIDED SCIATICA: ICD-10-CM

## 2020-08-03 DIAGNOSIS — G89.29 CHRONIC SI JOINT PAIN: ICD-10-CM

## 2020-08-03 PROCEDURE — 97110 THERAPEUTIC EXERCISES: CPT | Mod: GP | Performed by: PHYSICAL THERAPIST

## 2020-08-03 PROCEDURE — 97162 PT EVAL MOD COMPLEX 30 MIN: CPT | Mod: GP | Performed by: PHYSICAL THERAPIST

## 2020-08-03 NOTE — LETTER
[facility]  [address] [address] [place] [id number]  [phone]    2020    Re: Kristal Letser   :   1960  MRN:  [id number]   REFERRING PHYSICIAN:   Mac Urbina    [facility]  Date of Initial Evaluation:  8/3/2020  Visits:  Rxs Used: 1  Reason for Referral:      Chronic SI joint pain  Acute bilateral low back pain with right-sided sciatica    EVALUATION SUMMARY  [facility] Initial Evaluation  Subjective:  [facility] Initial Evaluation  Ms. Lester injured her low back on 2020 - while lifting a 50# bag of cement sand to dump in a garbage can at home. PMH of a right SI fusion.  Her surgeon is concerned that the SI fusion has not healed.  Further testing is indicated, but currently the patient cannot afford a CT.  She is very motivated to start a home exercise program.  Radiology indicates some arthritic changes at L4-L5, which is the area her surgeon wants PT to help.The history is provided by the patient. No  was used.     Patient Health History  Kristal Lester being seen for Lower back pain.   Problem began: 2020.   Problem occurred: Lifting a 50 lb bag of cement.   Pain is reported as 4/10 on pain scale.  General health as reported by patient is good.  Red flags:  None as reported by patient.  Medical allergies: none.   Surgeries include:  Orthopedic surgery. Other surgery history details: SI Joint Surgery.    Current occupation is .   Primary job tasks include:  Computer work.     Therapist Generated HPI Evaluation  Type of problem:  Lumbar.  This is a new condition.  Condition occurred with:  Lifting.  Where condition occurred: at home.  Patient reports pain:  Lower lumbar spine.  Pain is described as aching and is constant.  Pain radiates to:  Gluteals right and thigh right. Pain is the same all the time.  Since onset symptoms are  unchanged.  Symptoms are exacerbated by certain positions, bending, lifting and sitting  and relieved by activity/movement.  Restrictions due to condition include:  Working in normal job without restrictions.  Barriers include:  None as reported by patient.    Patient Health History  Kristal Lester being seen for Lower back pain.   Problem began: 5/1/2020.   Problem occurred: Lifting a 50 lb bag of cement.   Pain is reported as 4/10 on pain scale.  General health as reported by patient is good.  Red flags:  None as reported by patient.  Medical allergies: none.   Surgeries include:  Orthopedic surgery. Other surgery history details: SI Joint Surgery.    Current occupation is .   Primary job tasks include:  Computer work.                Objective:  Standing Alignment:    Lumbar:  Lordosis decr  Gait:    Gait Type:  Antalgic         Lumbar/SI Evaluation  ROM:  AROM Lumbar: not assessed  Lumbar Myotomes:  normal  Lumbar DTR's:  not assessed  Cord Signs:  not assessed  Neural Tension/Mobility:    Left side:SLR; SLR w/DF or Slump  negative.   Right side:   SLR w/DF; Slump or SLR  negative.   Lumbar Palpation:    Tenderness present at Right: Erector Spinae and Piriformis                               Musculoskeletal:        Back:      ROS  Assessment/Plan:    Patient is a 60 year old female with lumbar complaints.    Patient has the following significant findings with corresponding treatment plan.                Diagnosis 1:  LBP  Pain -  self management, education and home program  Impaired muscle performance - neuro re-education and home program  Decreased function - therapeutic activities and home program    Therapy Evaluation Codes:   1) History comprised of:   Personal factors that impact the plan of care:      None.    Comorbidity factors that impact the plan of care are:      None.     Medications impacting care: None reported.  2) Examination of Body Systems comprised of:   Body structures  and functions that impact the plan of care:      Lumbar spine.   Activity limitations that impact the plan of care are:      Sitting and Sleeping.  3) Clinical presentation characteristics are:   Evolving/Changing.  4) Decision-Making    Moderate complexity using standardized patient assessment instrument and/or measureable assessment of functional outcome.    Cumulative Therapy Evaluation is: Moderate complexity.  Previous and current functional limitations:  (See Goal Flow Sheet for this information)    Short term and Long term goals: (See Goal Flow Sheet for this information)   Communication ability:  Patient appears to be able to clearly communicate and understand verbal and written communication and follow directions correctly.  Treatment Explanation - The following has been discussed with the patient:   RX ordered/plan of care  Anticipated outcomes  Possible risks and side effects  This patient would benefit from PT intervention to resume normal activities.   Rehab potential is good.    Frequency:  1 X week, once daily, every other week  Duration:  for 8 weeks  Discharge Plan:  Achieve all LTG.  Independent in home treatment program.  Reach maximal therapeutic benefit.    Thank you for your referral.          INQUIRIES  Therapist: Denisse Elizondo PT, DPT   [address] [address]  Phone: [phone]  Fax: [phone]

## 2020-08-04 NOTE — PROGRESS NOTES
[facility] Initial Evaluation  Subjective:  [facility] Initial Evaluation    Ms. Lester injured her low back on 5/1/2020 - while lifting a 50# bag of cement sand to dump in a garbage can at home. PMH of a right SI fusion.  Her surgeon is concerned that the SI fusion has not healed.  Further testing is indicated, but currently the patient cannot afford a CT.  She is very motivated to start a home exercise program.  Radiology indicates some arthritic changes at L4-L5, which is the area her surgeon wants PT to help.    The history is provided by the patient. No  was used.   Patient Health History  Kristal Lester being seen for Lower back pain.     Problem began: 5/1/2020.   Problem occurred: Lifting a 50 lb bag of cement.   Pain is reported as 4/10 on pain scale.  General health as reported by patient is good.     Red flags:  None as reported by patient.  Medical allergies: none.   Surgeries include:  Orthopedic surgery. Other surgery history details: SI Joint Surgery.        Current occupation is .   Primary job tasks include:  Computer work.                  Therapist Generated HPI Evaluation         Type of problem:  Lumbar.    This is a new condition.  Condition occurred with:  Lifting.  Where condition occurred: at home.  Patient reports pain:  Lower lumbar spine.  Pain is described as aching and is constant.  Pain radiates to:  Gluteals right and thigh right. Pain is the same all the time.  Since onset symptoms are unchanged.  Symptoms are exacerbated by certain positions, bending, lifting and sitting  and relieved by activity/movement.      Restrictions due to condition include:  Working in normal job without restrictions.  Barriers include:  None as reported by patient.    Patient Health History  Kristal Lester being seen for Lower back pain.     Problem began: 5/1/2020.   Problem occurred: Lifting a 50 lb bag of cement.    Pain is reported as 4/10 on pain scale.  General health as reported by patient is good.     Red flags:  None as reported by patient.  Medical allergies: none.   Surgeries include:  Orthopedic surgery. Other surgery history details: SI Joint Surgery.        Current occupation is .   Primary job tasks include:  Computer work.                                    Objective:  Standing Alignment:        Lumbar:  Lordosis decr            Gait:    Gait Type:  Antalgic                    Lumbar/SI Evaluation  ROM:  AROM Lumbar: not assessed      Lumbar Myotomes:  normal            Lumbar DTR's:  not assessed      Cord Signs:  not assessed      Neural Tension/Mobility:      Left side:SLR; SLR w/DF or Slump  negative.     Right side:   SLR w/DF; Slump or SLR  negative.   Lumbar Palpation:      Tenderness present at Right: Erector Spinae and Piriformis                                                                                          Musculoskeletal:        Back:        ROS    Assessment/Plan:    Patient is a 60 year old female with lumbar complaints.    Patient has the following significant findings with corresponding treatment plan.                Diagnosis 1:  LBP  Pain -  self management, education and home program  Impaired muscle performance - neuro re-education and home program  Decreased function - therapeutic activities and home program    Therapy Evaluation Codes:   1) History comprised of:   Personal factors that impact the plan of care:      None.    Comorbidity factors that impact the plan of care are:      None.     Medications impacting care: None reported.  2) Examination of Body Systems comprised of:   Body structures and functions that impact the plan of care:      Lumbar spine.   Activity limitations that impact the plan of care are:      Sitting and Sleeping.  3) Clinical presentation characteristics are:   Evolving/Changing.  4) Decision-Making    Moderate complexity using  standardized patient assessment instrument and/or measureable assessment of functional outcome.  Cumulative Therapy Evaluation is: Moderate complexity.    Previous and current functional limitations:  (See Goal Flow Sheet for this information)    Short term and Long term goals: (See Goal Flow Sheet for this information)     Communication ability:  Patient appears to be able to clearly communicate and understand verbal and written communication and follow directions correctly.  Treatment Explanation - The following has been discussed with the patient:   RX ordered/plan of care  Anticipated outcomes  Possible risks and side effects  This patient would benefit from PT intervention to resume normal activities.   Rehab potential is good.    Frequency:  1 X week, once daily, every other week  Duration:  for 8 weeks  Discharge Plan:  Achieve all LTG.  Independent in home treatment program.  Reach maximal therapeutic benefit.    Please refer to the daily flowsheet for treatment today, total treatment time and time spent performing 1:1 timed codes.

## 2020-08-17 ENCOUNTER — THERAPY VISIT (OUTPATIENT)
Dept: PHYSICAL THERAPY | Facility: CLINIC | Age: 60
End: 2020-08-17
Payer: COMMERCIAL

## 2020-08-17 DIAGNOSIS — M54.41 ACUTE BILATERAL LOW BACK PAIN WITH RIGHT-SIDED SCIATICA: ICD-10-CM

## 2020-08-17 PROCEDURE — 97112 NEUROMUSCULAR REEDUCATION: CPT | Mod: GP | Performed by: PHYSICAL THERAPIST

## 2020-08-17 PROCEDURE — 97110 THERAPEUTIC EXERCISES: CPT | Mod: GP | Performed by: PHYSICAL THERAPIST

## 2020-10-31 PROBLEM — M54.41 BILATERAL LOW BACK PAIN WITH RIGHT-SIDED SCIATICA: Status: RESOLVED | Noted: 2020-08-03 | Resolved: 2020-10-31

## 2020-10-31 NOTE — PROGRESS NOTES
Subjective:  HPI  Physical Exam                    Objective:  System    Physical Exam    General     ROS    Assessment/Plan:    DISCHARGE REPORT    Progress reporting period is from 8/3 to 8/17/2020.       SUBJECTIVE   Subjective: Mostly painful in the morning, once up and around she feels better.  Walking 2 dogs 5 times a day.  She feels better with  her SI symptoms.  Consistent home exercise program.    Current Pain level: 5/10.      Initial Pain level: 6/10.   Changes in function:  Yes (See Goal flowsheet attached for changes in current functional level)  Adverse reaction to treatment or activity: None    OBJECTIVE  Changes noted in objective findings:  Patient has failed to return to therapy so current objective findings are unknown.  Objective: Improved transitional motion sit to supine.  Good engagement of abdominal muscles with home exercise program.     ASSESSMENT/PLAN  Updated problem list and treatment plan: Diagnosis 1:  SI pain  Pain -  self management, education and home program  Impaired gait - home program  Impaired muscle performance - neuro re-education and home program  Decreased function - therapeutic activities and home program  STG/LTGs have been met or progress has been made towards goals:  Yes (See Goal flow sheet completed today.)  Assessment of Progress: The patient's condition has potential to improve.  Self Management Plans:  Patient has been instructed in a home treatment program.  Patient  has been instructed in self management of symptoms.    Kristal continues to require the following intervention to meet STG and LTG's:  PT intervention is no longer required to meet STG/LTG.    Recommendations:  This patient is ready to be discharged from therapy and continue their home treatment program.    Please refer to the daily flowsheet for treatment today, total treatment time and time spent performing 1:1 timed codes.

## 2020-12-27 ENCOUNTER — HEALTH MAINTENANCE LETTER (OUTPATIENT)
Age: 60
End: 2020-12-27

## 2021-03-01 ENCOUNTER — ANCILLARY PROCEDURE (OUTPATIENT)
Dept: GENERAL RADIOLOGY | Facility: CLINIC | Age: 61
End: 2021-03-01
Attending: PREVENTIVE MEDICINE
Payer: COMMERCIAL

## 2021-03-01 ENCOUNTER — OFFICE VISIT (OUTPATIENT)
Dept: ORTHOPEDICS | Facility: CLINIC | Age: 61
End: 2021-03-01
Payer: COMMERCIAL

## 2021-03-01 ENCOUNTER — TELEPHONE (OUTPATIENT)
Dept: ORTHOPEDICS | Facility: CLINIC | Age: 61
End: 2021-03-01

## 2021-03-01 VITALS
SYSTOLIC BLOOD PRESSURE: 117 MMHG | DIASTOLIC BLOOD PRESSURE: 75 MMHG | BODY MASS INDEX: 28.01 KG/M2 | HEART RATE: 74 BPM | HEIGHT: 65 IN

## 2021-03-01 DIAGNOSIS — M25.522 CHRONIC ELBOW PAIN, LEFT: Primary | ICD-10-CM

## 2021-03-01 DIAGNOSIS — M25.522 CHRONIC ELBOW PAIN, LEFT: ICD-10-CM

## 2021-03-01 DIAGNOSIS — G89.29 CHRONIC ELBOW PAIN, LEFT: Primary | ICD-10-CM

## 2021-03-01 DIAGNOSIS — M54.12 CERVICAL RADICULAR PAIN: ICD-10-CM

## 2021-03-01 DIAGNOSIS — M50.30 DDD (DEGENERATIVE DISC DISEASE), CERVICAL: ICD-10-CM

## 2021-03-01 DIAGNOSIS — G89.29 CHRONIC ELBOW PAIN, LEFT: ICD-10-CM

## 2021-03-01 PROCEDURE — 72040 X-RAY EXAM NECK SPINE 2-3 VW: CPT | Performed by: RADIOLOGY

## 2021-03-01 PROCEDURE — 99204 OFFICE O/P NEW MOD 45 MIN: CPT | Performed by: PREVENTIVE MEDICINE

## 2021-03-01 PROCEDURE — 73080 X-RAY EXAM OF ELBOW: CPT | Mod: LT | Performed by: RADIOLOGY

## 2021-03-01 RX ORDER — DICLOFENAC SODIUM 75 MG/1
75 TABLET, DELAYED RELEASE ORAL 2 TIMES DAILY PRN
Qty: 40 TABLET | Refills: 1 | Status: ON HOLD | OUTPATIENT
Start: 2021-03-01 | End: 2021-08-12

## 2021-03-01 RX ORDER — LORAZEPAM 0.5 MG/1
0.5 TABLET ORAL AT BEDTIME
COMMUNITY
End: 2022-11-16

## 2021-03-01 RX ORDER — ESCITALOPRAM OXALATE 10 MG/1
10 TABLET ORAL EVERY MORNING
COMMUNITY

## 2021-03-01 RX ORDER — CYCLOBENZAPRINE HCL 5 MG
5-10 TABLET ORAL
Qty: 45 TABLET | Refills: 0 | Status: SHIPPED | OUTPATIENT
Start: 2021-03-01 | End: 2024-02-22

## 2021-03-01 ASSESSMENT — PAIN SCALES - GENERAL: PAINLEVEL: MILD PAIN (3)

## 2021-03-01 NOTE — LETTER
3/1/2021         RE: Kristal Lester  [address]  [facility] [address]        Dear Colleague,    Thank you for referring your patient, Kristal Lester, to the [facility] [place]. Please see a copy of my visit note below.    HISTORY OF PRESENT ILLNESS  Ms. Lester is a pleasant 60 year old year old female who presents to clinic today with left elbow pain and some neck pain  Kristal explains that she has had this worsened over the past 6 months  Location: left elbow   Quality:  achy pain    Severity: 5/10 at worst    Duration: see above  Timing: occurs intermittently  Context: occurs while exercising and lifting  Modifying factors:  resting and non-use makes it better, movement and use makes it worse  Associated signs & symptoms: neck and left elbow with some radiation into hand    MEDICAL HISTORY  Patient Active Problem List   Diagnosis     Ulnocarpal impingement syndrome     Hallux valgus with bunions     High frequency hearing loss of both ears     Hyperlipidemia     Pineal gland cyst     Benign paroxysmal positional vertigo       Current Outpatient Medications   Medication Sig Dispense Refill     Calcium 200 MG TABS        EPINEPHrine (EPIPEN/ADRENACLICK/OR ANY BX GENERIC EQUIV) 0.3 MG/0.3ML injection 2-pack Inject 0.3 mg into the muscle as needed for anaphylaxis       escitalopram (LEXAPRO) 10 MG tablet Take 10 mg by mouth daily       estradiol (VIVELLE-DOT) 0.05 MG/24HR bi-weekly patch Place 1 patch onto the skin twice a week       LORazepam (ATIVAN) 0.5 MG tablet Take 0.5 mg by mouth every 6 hours as needed for anxiety       progesterone (PROMETRIUM) 100 MG capsule Take 100 mg by mouth At Bedtime        rosuvastatin (CRESTOR) 10 MG tablet Take 10 mg by mouth At Bedtime       vitamin D3 (CHOLECALCIFEROL) 2000 units (50 mcg) tablet Take 1 tablet by mouth At Bedtime       acetaminophen (TYLENOL 8 HOUR) 650 MG CR tablet Take 650 mg by mouth as needed for  mild pain or fever       diazepam (VALIUM) 5 MG tablet Take 1 tablet (5 mg) by mouth every 6 hours as needed for muscle spasms 20 tablet 0     docusate sodium (COLACE) 100 MG capsule Take 1 capsule (100 mg) by mouth 2 times daily 20 capsule 0     HYDROcodone-acetaminophen (LORTAB) 5-325 MG tablet Take 1-2 tablets by mouth every 4 hours as needed for severe pain 50 tablet 0     methocarbamol (ROBAXIN) 500 MG tablet Take 1 tablet (500 mg) by mouth nightly as needed for muscle spasms 30 tablet 0     ondansetron (ZOFRAN-ODT) 4 MG ODT tab Take 1-2 tablets (4-8 mg) by mouth every 8 hours as needed for nausea 4 tablet 0     order for DME SI-loc belt 1 Units 0     polyethylene glycol (MIRALAX/GLYCOLAX) packet Take 17 g by mouth daily 10 packet 0     senna-docusate (SENOKOT-S/PERICOLACE) 8.6-50 MG tablet Take 1-2 tablets by mouth 2 times daily 30 tablet 0       Allergies   Allergen Reactions     Bees Shortness Of Breath     Cellulitis of the leg       Family History   Problem Relation Age of Onset     Scleroderma Mother      Chronic Obstructive Pulmonary Disease Father      Heart Disease Father      Hyperlipidemia Father      Alzheimer Disease Maternal Grandmother      Parkinsonism Maternal Grandfather      Mental Illness Brother      Social History     Socioeconomic History     Marital status:      Spouse name: Not on file     Number of children: Not on file     Years of education: Not on file     Highest education level: Not on file   Occupational History     Not on file   Social Needs     Financial resource strain: Not on file     Food insecurity     Worry: Not on file     Inability: Not on file     Transportation needs     Medical: Not on file     Non-medical: Not on file   Tobacco Use     Smoking status: Never Smoker     Smokeless tobacco: Never Used   Substance and Sexual Activity     Alcohol use: Not Currently     Drug use: Not Currently     Sexual activity: Not on file   Lifestyle     Physical activity      "Days per week: Not on file     Minutes per session: Not on file     Stress: Not on file   Relationships     Social connections     Talks on phone: Not on file     Gets together: Not on file     Attends Yarsanism service: Not on file     Active member of club or organization: Not on file     Attends meetings of clubs or organizations: Not on file     Relationship status: Not on file     Intimate partner violence     Fear of current or ex partner: Not on file     Emotionally abused: Not on file     Physically abused: Not on file     Forced sexual activity: Not on file   Other Topics Concern     Not on file   Social History Narrative     Not on file       Additional medical/Social/Surgical histories reviewed in [facility] and updated as appropriate.     REVIEW OF SYSTEMS (3/1/2021)  10 point ROS of systems including Constitutional, Eyes, Respiratory, Cardiovascular, Gastroenterology, Genitourinary, Integumentary, Musculoskeletal, Psychiatric, Allergic/Immunologic were all negative except for pertinent positives noted in my HPI.     PHYSICAL EXAM  Vitals:    03/01/21 0841   BP: 117/75   Pulse: 74   Height: 1.651 m (5' 5\")     Vital Signs: /75   Pulse 74   Ht 1.651 m (5' 5\")   BMI 28.01 kg/m   Patient declined being weighed. Body mass index is 28.01 kg/m .    General  - normal appearance, in no obvious distress  HEENT  - conjunctivae not injected, moist mucous membranes, normocephalic/atraumatic head, ears normal appearance, no lesions, mouth normal appearance, no scars, normal dentition and teeth present  CV  - normal radial pulse  Pulm  - normal respiratory pattern, non-labored  Musculoskeletal - left elbow  - inspection: normal joint alignment, no obvious deformity, mild soft tissue swelling medially  - palpation: tender at the origin of the common flexor tendon  - ROM:  160 deg flexion   0 deg extension   90 deg pronation   90 deg supination  - strength: 5/5 wrist flexion with elbow flexed, 4/5 with elbow " extended, painful resisted flexion of fingers with elbow flexed, worse with extension, 5/5  strength  - special tests:  (-) varus  (-) valgus  (-) Tinel's  Neuro  - no sensory or motor deficit, grossly normal coordination, normal muscle tone  Skin  - no ecchymosis, erythema, warmth, or induration, no obvious rash  Psych  - interactive, appropriate, normal mood and affect  Neck ; has some pain with flexion and extension, positive spurlings on left  ASSESSMENT & PLAN  59 yo female with left medial epidcondylitis, cervical radicular pain, some cervical ddd  Reviewed cervical xrays: shows some ddd  Reviewed elbow xrays: WNL  Discussed and ordered cervical MRI  voltaren and flexeril PRN  F/u after MRI    Appropriate PPE was utilized for prevention of spread of Covid-19.  Santiago Gan MD, CAQSM        Again, thank you for allowing me to participate in the care of your patient.        Sincerely,        Santiago Gan MD

## 2021-03-01 NOTE — TELEPHONE ENCOUNTER
3/1 Provided phone [phone] to schedule mri.     Delia Marin   Procedure    Ortho/Sports Med/Pod/Ent/Eye  [facility]   [phone]

## 2021-03-01 NOTE — PROGRESS NOTES
HISTORY OF PRESENT ILLNESS  Ms. Lester is a pleasant 60 year old year old female who presents to clinic today with left elbow pain and some neck pain  Kristal explains that she has had this worsened over the past 6 months  Location: left elbow   Quality:  achy pain    Severity: 5/10 at worst    Duration: see above  Timing: occurs intermittently  Context: occurs while exercising and lifting  Modifying factors:  resting and non-use makes it better, movement and use makes it worse  Associated signs & symptoms: neck and left elbow with some radiation into hand    MEDICAL HISTORY  Patient Active Problem List   Diagnosis     Ulnocarpal impingement syndrome     Hallux valgus with bunions     High frequency hearing loss of both ears     Hyperlipidemia     Pineal gland cyst     Benign paroxysmal positional vertigo       Current Outpatient Medications   Medication Sig Dispense Refill     Calcium 200 MG TABS        EPINEPHrine (EPIPEN/ADRENACLICK/OR ANY BX GENERIC EQUIV) 0.3 MG/0.3ML injection 2-pack Inject 0.3 mg into the muscle as needed for anaphylaxis       escitalopram (LEXAPRO) 10 MG tablet Take 10 mg by mouth daily       estradiol (VIVELLE-DOT) 0.05 MG/24HR bi-weekly patch Place 1 patch onto the skin twice a week       LORazepam (ATIVAN) 0.5 MG tablet Take 0.5 mg by mouth every 6 hours as needed for anxiety       progesterone (PROMETRIUM) 100 MG capsule Take 100 mg by mouth At Bedtime        rosuvastatin (CRESTOR) 10 MG tablet Take 10 mg by mouth At Bedtime       vitamin D3 (CHOLECALCIFEROL) 2000 units (50 mcg) tablet Take 1 tablet by mouth At Bedtime       acetaminophen (TYLENOL 8 HOUR) 650 MG CR tablet Take 650 mg by mouth as needed for mild pain or fever       diazepam (VALIUM) 5 MG tablet Take 1 tablet (5 mg) by mouth every 6 hours as needed for muscle spasms 20 tablet 0     docusate sodium (COLACE) 100 MG capsule Take 1 capsule (100 mg) by mouth 2 times daily 20 capsule 0     HYDROcodone-acetaminophen (LORTAB)  5-325 MG tablet Take 1-2 tablets by mouth every 4 hours as needed for severe pain 50 tablet 0     methocarbamol (ROBAXIN) 500 MG tablet Take 1 tablet (500 mg) by mouth nightly as needed for muscle spasms 30 tablet 0     ondansetron (ZOFRAN-ODT) 4 MG ODT tab Take 1-2 tablets (4-8 mg) by mouth every 8 hours as needed for nausea 4 tablet 0     order for DME SI-loc belt 1 Units 0     polyethylene glycol (MIRALAX/GLYCOLAX) packet Take 17 g by mouth daily 10 packet 0     senna-docusate (SENOKOT-S/PERICOLACE) 8.6-50 MG tablet Take 1-2 tablets by mouth 2 times daily 30 tablet 0       Allergies   Allergen Reactions     Bees Shortness Of Breath     Cellulitis of the leg       Family History   Problem Relation Age of Onset     Scleroderma Mother      Chronic Obstructive Pulmonary Disease Father      Heart Disease Father      Hyperlipidemia Father      Alzheimer Disease Maternal Grandmother      Parkinsonism Maternal Grandfather      Mental Illness Brother      Social History     Socioeconomic History     Marital status:      Spouse name: Not on file     Number of children: Not on file     Years of education: Not on file     Highest education level: Not on file   Occupational History     Not on file   Social Needs     Financial resource strain: Not on file     Food insecurity     Worry: Not on file     Inability: Not on file     Transportation needs     Medical: Not on file     Non-medical: Not on file   Tobacco Use     Smoking status: Never Smoker     Smokeless tobacco: Never Used   Substance and Sexual Activity     Alcohol use: Not Currently     Drug use: Not Currently     Sexual activity: Not on file   Lifestyle     Physical activity     Days per week: Not on file     Minutes per session: Not on file     Stress: Not on file   Relationships     Social connections     Talks on phone: Not on file     Gets together: Not on file     Attends Worship service: Not on file     Active member of club or organization: Not on  "file     Attends meetings of clubs or organizations: Not on file     Relationship status: Not on file     Intimate partner violence     Fear of current or ex partner: Not on file     Emotionally abused: Not on file     Physically abused: Not on file     Forced sexual activity: Not on file   Other Topics Concern     Not on file   Social History Narrative     Not on file       Additional medical/Social/Surgical histories reviewed in [facility] and updated as appropriate.     REVIEW OF SYSTEMS (3/1/2021)  10 point ROS of systems including Constitutional, Eyes, Respiratory, Cardiovascular, Gastroenterology, Genitourinary, Integumentary, Musculoskeletal, Psychiatric, Allergic/Immunologic were all negative except for pertinent positives noted in my HPI.     PHYSICAL EXAM  Vitals:    03/01/21 0841   BP: 117/75   Pulse: 74   Height: 1.651 m (5' 5\")     Vital Signs: /75   Pulse 74   Ht 1.651 m (5' 5\")   BMI 28.01 kg/m   Patient declined being weighed. Body mass index is 28.01 kg/m .    General  - normal appearance, in no obvious distress  HEENT  - conjunctivae not injected, moist mucous membranes, normocephalic/atraumatic head, ears normal appearance, no lesions, mouth normal appearance, no scars, normal dentition and teeth present  CV  - normal radial pulse  Pulm  - normal respiratory pattern, non-labored  Musculoskeletal - left elbow  - inspection: normal joint alignment, no obvious deformity, mild soft tissue swelling medially  - palpation: tender at the origin of the common flexor tendon  - ROM:  160 deg flexion   0 deg extension   90 deg pronation   90 deg supination  - strength: 5/5 wrist flexion with elbow flexed, 4/5 with elbow extended, painful resisted flexion of fingers with elbow flexed, worse with extension, 5/5  strength  - special tests:  (-) varus  (-) valgus  (-) Tinel's  Neuro  - no sensory or motor deficit, grossly normal coordination, normal muscle tone  Skin  - no ecchymosis, erythema, warmth, or " induration, no obvious rash  Psych  - interactive, appropriate, normal mood and affect  Neck ; has some pain with flexion and extension, positive spurlings on left  ASSESSMENT & PLAN  59 yo female with left medial epidcondylitis, cervical radicular pain, some cervical ddd  Reviewed cervical xrays: shows some ddd  Reviewed elbow xrays: WNL  Discussed and ordered cervical MRI  voltaren and flexeril PRN  F/u after MRI    Appropriate PPE was utilized for prevention of spread of Covid-19.  Santiago aGn MD, CAQSM

## 2021-03-01 NOTE — PATIENT INSTRUCTIONS
Thanks for coming today.  Ortho/Sports Medicine Clinic  [address] [place] [address]    To schedule future appointments in Ortho Clinic, you may call [phone].    To schedule ordered imaging by your provider:   Call [facility] [phone]    To schedule an injection ordered by your provider:  Call [facility] Injection scheduling line: [phone]  [facility] available online at:  [email or web address]    Please call if any further questions or concerns ([phone]).  Clinic hours 8 am to 5 pm.    Return to clinic (call) if symptoms worsen or fail to improve.    Financial Consumer Hotline: [phone]

## 2021-03-03 DIAGNOSIS — G89.29 CHRONIC SI JOINT PAIN: ICD-10-CM

## 2021-03-03 DIAGNOSIS — M53.3 CHRONIC SI JOINT PAIN: ICD-10-CM

## 2021-03-03 DIAGNOSIS — M53.3 SI (SACROILIAC) JOINT DYSFUNCTION: Primary | ICD-10-CM

## 2021-03-12 ENCOUNTER — TRANSFERRED RECORDS (OUTPATIENT)
Dept: HEALTH INFORMATION MANAGEMENT | Facility: CLINIC | Age: 61
End: 2021-03-12

## 2021-03-16 ENCOUNTER — VIRTUAL VISIT (OUTPATIENT)
Dept: ORTHOPEDICS | Facility: CLINIC | Age: 61
End: 2021-03-16
Payer: COMMERCIAL

## 2021-03-16 ENCOUNTER — TELEPHONE (OUTPATIENT)
Dept: ORTHOPEDICS | Facility: CLINIC | Age: 61
End: 2021-03-16

## 2021-03-16 DIAGNOSIS — M50.30 DDD (DEGENERATIVE DISC DISEASE), CERVICAL: Primary | ICD-10-CM

## 2021-03-16 DIAGNOSIS — M54.12 CERVICAL RADICULAR PAIN: ICD-10-CM

## 2021-03-16 PROCEDURE — 99213 OFFICE O/P EST LOW 20 MIN: CPT | Mod: TEL | Performed by: PREVENTIVE MEDICINE

## 2021-03-16 NOTE — LETTER
3/16/2021         RE: Kristal Lester  [address]  [place] [address]        Dear Colleague,    Thank you for referring your patient, Kristal Lester, to the [facility] [place]. Please see a copy of my visit note below.    Kristal is a 60 year old who is being evaluated via a billable telephone visit.      What phone number would you like to be contacted at? cell  How would you like to obtain your AVS? MyChart        Subjective   Kristal is a 60 year old who presents for ongoing low back and neck pain  Still having pain in both areas  Wants to discuss a plan   HPI     Review of Systems   Constitutional, HEENT, cardiovascular, pulmonary, gi and gu systems are negative, except as otherwise noted.      Objective           Vitals:  No vitals were obtained today due to virtual visit.    Physical Exam   healthy, alert and no distress  PSYCH: Alert and oriented times 3; coherent speech, normal   rate and volume, able to articulate logical thoughts, able   to abstract reason, no tangential thoughts, no hallucinations   or delusions  Her affect is normal  RESP: No cough, no audible wheezing, able to talk in full sentences  Remainder of exam unable to be completed due to telephone visits    Assessment:   61 yo female with lumbar and cervical ddd, not resolved  Plan:     Referred to Dr Urbina for spine surgery consultation for lumbar spine  Ordered cervical MRI   Consider cervical Bryant  F/u with me after cervical MRI        Phone call duration: 14 minutes  Phone call start: 8:35am  Phone call end: 8:49am  Dr Gan      Again, thank you for allowing me to participate in the care of your patient.        Sincerely,        Santiago Gan MD

## 2021-03-16 NOTE — PROGRESS NOTES
Kristal is a 60 year old who is being evaluated via a billable telephone visit.      What phone number would you like to be contacted at? cell  How would you like to obtain your AVS? Janett        Subjective   Kristal is a 60 year old who presents for ongoing low back and neck pain  Still having pain in both areas  Wants to discuss a plan   HPI     Review of Systems   Constitutional, HEENT, cardiovascular, pulmonary, gi and gu systems are negative, except as otherwise noted.      Objective           Vitals:  No vitals were obtained today due to virtual visit.    Physical Exam   healthy, alert and no distress  PSYCH: Alert and oriented times 3; coherent speech, normal   rate and volume, able to articulate logical thoughts, able   to abstract reason, no tangential thoughts, no hallucinations   or delusions  Her affect is normal  RESP: No cough, no audible wheezing, able to talk in full sentences  Remainder of exam unable to be completed due to telephone visits    Assessment:   61 yo female with lumbar and cervical ddd, not resolved  Plan:     Referred to Dr Urbina for spine surgery consultation for lumbar spine  Ordered cervical MRI   Consider cervical Bryant  F/u with me after cervical MRI        Phone call duration: 14 minutes  Phone call start: 8:35am  Phone call end: 8:49am  Dr Gan

## 2021-03-16 NOTE — LETTER
3/16/2021         RE: Kristal Lester  [address]  [facility] [address]        Dear Colleague,    Thank you for referring your patient, Kristal Lester, to the [facility] [place]. Please see a copy of my visit note below.    Kristal is a 60 year old who is being evaluated via a billable telephone visit.      What phone number would you like to be contacted at? cell  How would you like to obtain your AVS? MyChart        Subjective   Kristal is a 60 year old who presents for ongoing low back and neck pain  Still having pain in both areas  Wants to discuss a plan   HPI     Review of Systems   Constitutional, HEENT, cardiovascular, pulmonary, gi and gu systems are negative, except as otherwise noted.      Objective           Vitals:  No vitals were obtained today due to virtual visit.    Physical Exam   healthy, alert and no distress  PSYCH: Alert and oriented times 3; coherent speech, normal   rate and volume, able to articulate logical thoughts, able   to abstract reason, no tangential thoughts, no hallucinations   or delusions  Her affect is normal  RESP: No cough, no audible wheezing, able to talk in full sentences  Remainder of exam unable to be completed due to telephone visits    Assessment:   59 yo female with lumbar and cervical ddd, not resolved  Plan:     Referred to Dr Urbina for spine surgery consultation for lumbar spine  Ordered cervical MRI   Consider cervical Bryant  F/u with me after cervical MRI        Phone call duration: 14 minutes  Phone call start: 8:35am  Phone call end: 8:49am  Dr Gan      Again, thank you for allowing me to participate in the care of your patient.        Sincerely,        Santiago Gan MD

## 2021-03-24 ENCOUNTER — DOCUMENTATION ONLY (OUTPATIENT)
Dept: CARE COORDINATION | Facility: CLINIC | Age: 61
End: 2021-03-24

## 2021-04-02 ENCOUNTER — VIRTUAL VISIT (OUTPATIENT)
Dept: ORTHOPEDICS | Facility: CLINIC | Age: 61
End: 2021-04-02
Payer: COMMERCIAL

## 2021-04-02 DIAGNOSIS — M53.3 SI (SACROILIAC) JOINT DYSFUNCTION: Primary | ICD-10-CM

## 2021-04-02 DIAGNOSIS — Z98.1 S/P SPINAL FUSION: ICD-10-CM

## 2021-04-02 PROCEDURE — 99212 OFFICE O/P EST SF 10 MIN: CPT | Mod: TEL | Performed by: ORTHOPAEDIC SURGERY

## 2021-04-02 NOTE — PROGRESS NOTES
Kristal is a 60 year old who is being evaluated via a billable telephone visit.      What phone number would you like to be contacted at? Home  How would you like to obtain your AVS? Josh  Phone call duration: 14 minutes    I called and spoke with Kristal. Since her last visit she recently had surgery on plantar fasciitis and she is wearing a boot and recovering from this.    With regards to her back she did have the CT-guided SI joint injection done and she says that for a few days her pain in the hip area was totally gone. She is very pleased with this. Then the pain returned and again there is an aching pain, similar to what she had before the SI joint fusion. She notes that the SI joint fusion took this away for about 6 months but then the symptoms returned.    I reviewed her recent pelvic radiographs which show some lucency around the implants concerning for nonunion.    I had asked her to get a lumbar CT scan as well as pelvic CT scan and she says this was done but I do not have the images today.    She does have a new cervical MRI and lumbar MRI. This showed multilevel spondylosis. She says that the neck and low back are not as bad as her hip and SI pain and we agreed that those areas would be on the back burner for a little bit while we focused on trying to help with her SI joint pain.    With regards to the ongoing SI pain my suspicion is that she has a nonunion. I would like to review her CT scan. If this has been done we will obtain it and if it has not been done we will order it again try to help her with this. I explained that if she is continuing to have pain and in fact it is a nonunion then we would need to consider a revision fusion. I went over the risks and benefits of this with her and she would like to proceed. I told her I had like her to get over the plantar fascia surgery and as she knows there may be some insurance difficulties getting approval. We will work towards getting this done  for her and I will plan to see her in clinic in 6 weeks to follow-up on her progress and make sure she is recovering from her other procedures.    Mac Urbina MD

## 2021-04-02 NOTE — LETTER
4/2/2021         RE: Kristal Lester  [address]  [facility] [address]        Dear Colleague,    Thank you for referring your patient, Kristal Lester, to the [facility] [place]. Please see a copy of my visit note below.    Kristal is a 60 year old who is being evaluated via a billable telephone visit.      What phone number would you like to be contacted at? Home  How would you like to obtain your AVS? Lisbethhart  Phone call duration: 14 minutes    I called and spoke with Kristal. Since her last visit she recently had surgery on plantar fasciitis and she is wearing a boot and recovering from this.    With regards to her back she did have the CT-guided SI joint injection done and she says that for a few days her pain in the hip area was totally gone. She is very pleased with this. Then the pain returned and again there is an aching pain, similar to what she had before the SI joint fusion. She notes that the SI joint fusion took this away for about 6 months but then the symptoms returned.    I reviewed her recent pelvic radiographs which show some lucency around the implants concerning for nonunion.    I had asked her to get a lumbar CT scan as well as pelvic CT scan and she says this was done but I do not have the images today.    She does have a new cervical MRI and lumbar MRI. This showed multilevel spondylosis. She says that the neck and low back are not as bad as her hip and SI pain and we agreed that those areas would be on the back burner for a little bit while we focused on trying to help with her SI joint pain.    With regards to the ongoing SI pain my suspicion is that she has a nonunion. I would like to review her CT scan. If this has been done we will obtain it and if it has not been done we will order it again try to help her with this. I explained that if she is continuing to have pain and in fact it is a nonunion then we would need to consider a  revision fusion. I went over the risks and benefits of this with her and she would like to proceed. I told her I had like her to get over the plantar fascia surgery and as she knows there may be some insurance difficulties getting approval. We will work towards getting this done for her and I will plan to see her in clinic in 6 weeks to follow-up on her progress and make sure she is recovering from her other procedures.    Mac Urbina MD

## 2021-04-06 ENCOUNTER — TELEPHONE (OUTPATIENT)
Dept: ORTHOPEDICS | Facility: CLINIC | Age: 61
End: 2021-04-06

## 2021-04-06 NOTE — TELEPHONE ENCOUNTER
----- Message from Izabela Alvarado RN sent at 4/2/2021  3:49 PM CDT -----  Regarding: call pt  Schedule 2 CTs & RTN appt  in 6 weeks in person with .  Thanks. Izabela LLOYD

## 2021-04-09 ENCOUNTER — ANCILLARY PROCEDURE (OUTPATIENT)
Dept: CT IMAGING | Facility: CLINIC | Age: 61
End: 2021-04-09
Attending: ORTHOPAEDIC SURGERY
Payer: COMMERCIAL

## 2021-04-09 DIAGNOSIS — Z98.1 S/P SPINAL FUSION: ICD-10-CM

## 2021-04-09 DIAGNOSIS — M53.3 SI (SACROILIAC) JOINT DYSFUNCTION: ICD-10-CM

## 2021-04-09 PROCEDURE — 72131 CT LUMBAR SPINE W/O DYE: CPT | Mod: GC | Performed by: RADIOLOGY

## 2021-04-09 PROCEDURE — 72192 CT PELVIS W/O DYE: CPT | Performed by: RADIOLOGY

## 2021-04-20 ASSESSMENT — ENCOUNTER SYMPTOMS
CHILLS: 0
NECK PAIN: 1
BACK PAIN: 1
DECREASED APPETITE: 0
JOINT SWELLING: 0
WEIGHT LOSS: 0
NUMBNESS: 0
FEVER: 0
WEIGHT GAIN: 0
TINGLING: 1
MEMORY LOSS: 0
HEADACHES: 1
ARTHRALGIAS: 1
POLYDIPSIA: 0
MUSCLE WEAKNESS: 1
INCREASED ENERGY: 0
SPEECH CHANGE: 0
POOR WOUND HEALING: 0
SKIN CHANGES: 0
WEAKNESS: 1
NAIL CHANGES: 0
STIFFNESS: 1
PARALYSIS: 0
NIGHT SWEATS: 1
MUSCLE CRAMPS: 0
FATIGUE: 1
DISTURBANCES IN COORDINATION: 0
TREMORS: 0
POLYPHAGIA: 0
HALLUCINATIONS: 0
DIZZINESS: 0
LOSS OF CONSCIOUSNESS: 0
SEIZURES: 0
ALTERED TEMPERATURE REGULATION: 0
MYALGIAS: 1

## 2021-04-24 ENCOUNTER — HEALTH MAINTENANCE LETTER (OUTPATIENT)
Age: 61
End: 2021-04-24

## 2021-05-04 ENCOUNTER — OFFICE VISIT (OUTPATIENT)
Dept: ORTHOPEDICS | Facility: CLINIC | Age: 61
End: 2021-05-04
Payer: COMMERCIAL

## 2021-05-04 VITALS — WEIGHT: 160 LBS | HEIGHT: 65 IN | BODY MASS INDEX: 26.66 KG/M2

## 2021-05-04 DIAGNOSIS — M53.3 SI (SACROILIAC) JOINT DYSFUNCTION: Primary | ICD-10-CM

## 2021-05-04 PROCEDURE — 99214 OFFICE O/P EST MOD 30 MIN: CPT | Performed by: ORTHOPAEDIC SURGERY

## 2021-05-04 ASSESSMENT — MIFFLIN-ST. JEOR: SCORE: 1296.64

## 2021-05-04 NOTE — NURSING NOTE
Teaching Flowsheet   Relevant Diagnosis: Right sided SI joint fusion open  Teaching Topic: Pre op teaching     Person(s) involved in teaching:   Patient     Motivation Level:  Asks Questions: Yes  Eager to Learn: Yes  Cooperative: Yes  Receptive (willing/able to accept information): Yes  Any cultural factors/Anglican beliefs that may influence understanding or compliance? No       Patient demonstrates understanding of the following:  Reason for the appointment, diagnosis and treatment plan: Yes  Knowledge of proper use of medications and conditions for which they are ordered (with special attention to potential side effects or drug interactions): Yes  Which situations necessitate calling provider and whom to contact: Yes       Teaching Concerns Addressed: RN discussed all aspects of pre and post op surgery with patient. Alize will call patient with surgery date and PAC appt.        Proper use and care of meds (medical equip, care aids, etc.): Yes  Nutritional needs and diet plan: Yes  Pain management techniques: Yes  Wound Care: Yes  How and/when to access community resources: Yes     Instructional Materials Used/Given: Pre op teaching and antibacterial soap.     Time spent with patient: 15 minutes.

## 2021-05-04 NOTE — LETTER
5/4/2021         RE: Kristal Lester  [address]  [facility] [address]        Dear Colleague,    Thank you for referring your patient, Kristal Lester, to the [facility] [place]. Please see a copy of my visit note below.    Spine Surgery Return Clinic Visit      Chief Complaint:   RECHECK (follow up on Pelvis and lumbar CT patient stated that her smptoms are getting worse and she had the injection with relief for about 1 week then symptoms came back )      Interval HPI:  Symptom Profile Including: location of symptoms, onset, severity, exacerbating/alleviating factors, previous treatments:        Kristal Lester is a 60 year old female who returns today in follow-up of continued right sacroiliac joint pain.  Previously she had undergone a right minimally invasive sacroiliac joint fusion with me.  This helped her for about a year.  Then she had a return of symptoms.  She now says that it feels like she never had surgery and things are back to the way they were before.  Given that the surgery initially helped her I had seen her back in clinic and performed repeat exam which seem consistent with recurrent sacroiliac joint pathology.  We sent her for a CT-guided right sacroiliac joint injection and she reports that this helped her for approximately 1 week with complete symptom relief, but now it has come back again.  She is having difficulty standing and walking particularly being seated for a long period of time.  She is very bothered by the symptoms and feels like she cannot be active and do the things she wants to do because of the disabling nature of the symptoms.  She returns today for a CT scan review and possible consideration of revision fusion.              Past Medical History:     Past Medical History:   Diagnosis Date     Benign paroxysmal positional vertigo, left 7/10/2019     HLD (hyperlipidemia)      PVC's (premature ventricular contractions)       SI (sacroiliac) joint dysfunction             Past Surgical History:     Past Surgical History:   Procedure Laterality Date     Bunion surgery  2017      SECTION  5 years ago, 35 years ago     ELBOW SURGERY Right      MAMMOPLASTY AUGMENTATION  23 years ago     OPTICAL TRACKING SYSTEM FUSION SACRAL ILIAC Right 2019    Procedure: Right Minimal Invasive Sacral Iliac Joint Fusion;  Surgeon: Mac Urbina MD;  Location: [facility] OR            Social History:     Social History     Tobacco Use     Smoking status: Never Smoker     Smokeless tobacco: Never Used   Substance Use Topics     Alcohol use: Not Currently            Family History:     Family History   Problem Relation Age of Onset     Scleroderma Mother      Chronic Obstructive Pulmonary Disease Father      Heart Disease Father      Hyperlipidemia Father      Alzheimer Disease Maternal Grandmother      Parkinsonism Maternal Grandfather      Mental Illness Brother             Allergies:     Allergies   Allergen Reactions     Bees Shortness Of Breath     Cellulitis of the leg            Medications:     Current Outpatient Medications   Medication     Calcium 200 MG TABS     cyclobenzaprine (FLEXERIL) 5 MG tablet     diclofenac (VOLTAREN) 75 MG EC tablet     EPINEPHrine (EPIPEN/ADRENACLICK/OR ANY BX GENERIC EQUIV) 0.3 MG/0.3ML injection 2-pack     escitalopram (LEXAPRO) 10 MG tablet     estradiol (VIVELLE-DOT) 0.05 MG/24HR bi-weekly patch     LORazepam (ATIVAN) 0.5 MG tablet     order for DME     progesterone (PROMETRIUM) 100 MG capsule     rosuvastatin (CRESTOR) 10 MG tablet     vitamin D3 (CHOLECALCIFEROL) 2000 units (50 mcg) tablet     No current facility-administered medications for this visit.              Review of Systems:   A focused musculoskeletal and neurologic ROS was performed with pertinent positives and negatives noted in the HPI.  Additional systems were also reviewed and are documented at the bottom of the note.         Physical  "Exam:   Vitals: Ht 1.651 m (5' 5\")   Wt 72.6 kg (160 lb)   BMI 26.63 kg/m    Musculoskeletal, Neurologic, and Spine:        Lumbar Spine:    Appearance - No gross stepoffs or deformities    Motor -     L2-3: Hip flexion 5/5 R and 5/5 L strength          L3/4:  Knee extension R 5/5 and L 5/5 strength         L4/5:  Foot dorsiflexion R 5/5 L 5/5 and       EHL dorsiflexion R 5/5 L 5/5 strength         S1:  Plantarflexion/Peroneal Muscles  R 5/5 and L 5/5 strength    Sensation: intact to light touch L3-S1 distribution BLE      Neurologic:      REFLEXES Left Right                  Patella 1+ 1+   Ankle jerk 1+ 1+   Babinski No upgoing great toe No upgoing great toe   Clonus 0 beats 0 beats     I examined her right sacroiliac joint.  She has no pain with hip logroll.  She has positive pain with thigh thrust anterior compression and Gaenslen's sign.  Mild pain when I relieve the lateral compression.  She is mildly tender over the greater trochanter.    Alignment:  Patient stands with a neutral standing sagittal balance.           Imaging:   We ordered and independently reviewed new radiographs at this clinic visit. The results were discussed with the patient. Findings include:     I reviewed the pelvic CT scan obtained after the last visit which does not show any obvious clear bony bridging across the sacroiliac joint.  On the coronal and axial views there is a slight halo around several of the implants.    I also reviewed her previous lumbar MRI which shows mild degenerative changes but no severe stenosis.  We also have pelvic and lumbar radiographs, which show the implants in place with no obvious complication and no obvious lumbar deformity.    She also had a April lumbar CT scan, which again shows lumbar spondylosis       Assessment and Plan:     60 year old female with recurrent right sacroiliac joint pain, concerning for nonunion given the positive provocative physical exam and response to injection as well as the " appearance of the CT scan.    We discussed options at this time.  She feels very disabled by the symptoms.  She is been doing anti-inflammatories physical therapy and unfortunately has a surgery that appears that has not healed.  She is also tried an SI joint belt and a series of injections.      We talked about options for revising this.  It can be done anteriorly through an ilioinguinal approach, the downside of which is a fairly extensive muscle dissection, or posteriorly with an attempt at posterior decortication and grafting of the joint.  In either case it would be necessary to try and placed iliosacral screws across the joint.  This would be done with Stealth navigation.  It is not possible to do a revision minimally invasive fusion.  She is already had a minimally invasive fusion attempt.  Given that this has not healed it will be necessary to do an open fusion and decortication of the joint.    I explained the main risk of the surgery is that she may not get full pain relief.  I quoted her about a 50% chance of improvement.  There is a small chance of infection small chance of implant malposition small chance of neurovascular or bowel or vessel injury with implant placement and the risks of anesthesia not limited to but including up to stroke heart attack and death.    We talked about the recovery and I think it would be similar to her previous surgery although probably some more muscle type pain given an open incision would be required.  She is interested in proceeding and I will work with our insurance people to try and get approval for her.           Respectfully,  Mac Urbina MD  Spine Surgery  [facility]    Answers for HPI/ROS submitted by the patient on 4/20/2021   General Symptoms: Yes  Skin Symptoms: Yes  HENT Symptoms: No  EYE SYMPTOMS: No  HEART SYMPTOMS: No  LUNG SYMPTOMS: No  INTESTINAL SYMPTOMS: No  URINARY SYMPTOMS: No  GYNECOLOGIC SYMPTOMS: No  BREAST SYMPTOMS:  No  SKELETAL SYMPTOMS: Yes  BLOOD SYMPTOMS: No  NERVOUS SYSTEM SYMPTOMS: Yes  MENTAL HEALTH SYMPTOMS: No  Fever: No  Loss of appetite: No  Weight loss: No  Weight gain: No  Fatigue: Yes  Night sweats: Yes  Chills: No  Increased stress: No  Excessive hunger: No  Excessive thirst: No  Feeling hot or cold when others believe the temperature is normal: No  Loss of height: No  Post-operative complications: Yes  Surgical site pain: Yes  Hallucinations: No  Change in or Loss of Energy: No  Hyperactivity: No  Confusion: No  Changes in hair: No  Changes in moles/birth marks: No  Itching: Yes  Rashes: Yes  Changes in nails: No  Acne: No  Hair in places you don't want it: No  Change in facial hair: No  Warts: No  Non-healing sores: No  Scarring: No  Flaking of skin: No  Color changes of hands/feet in cold : No  Sun sensitivity: No  Skin thickening: No  Back pain: Yes  Muscle aches: Yes  Neck pain: Yes  Swollen joints: No  Joint pain: Yes  Bone pain: Yes  Muscle cramps: No  Muscle weakness: Yes  Joint stiffness: Yes  Bone fracture: No  Trouble with coordination: No  Dizziness or trouble with balance: No  Fainting or black-out spells: No  Memory loss: No  Headache: Yes  Seizures: No  Speech problems: No  Tingling: Yes  Tremor: No  Weakness: Yes  Difficulty walking: Yes  Paralysis: No  Numbness: No

## 2021-05-04 NOTE — PROGRESS NOTES
Spine Surgery Return Clinic Visit      Chief Complaint:   RECHECK (follow up on Pelvis and lumbar CT patient stated that her smptoms are getting worse and she had the injection with relief for about 1 week then symptoms came back )      Interval HPI:  Symptom Profile Including: location of symptoms, onset, severity, exacerbating/alleviating factors, previous treatments:        Kristal Lester is a 60 year old female who returns today in follow-up of continued right sacroiliac joint pain.  Previously she had undergone a right minimally invasive sacroiliac joint fusion with me.  This helped her for about a year.  Then she had a return of symptoms.  She now says that it feels like she never had surgery and things are back to the way they were before.  Given that the surgery initially helped her I had seen her back in clinic and performed repeat exam which seem consistent with recurrent sacroiliac joint pathology.  We sent her for a CT-guided right sacroiliac joint injection and she reports that this helped her for approximately 1 week with complete symptom relief, but now it has come back again.  She is having difficulty standing and walking particularly being seated for a long period of time.  She is very bothered by the symptoms and feels like she cannot be active and do the things she wants to do because of the disabling nature of the symptoms.  She returns today for a CT scan review and possible consideration of revision fusion.              Past Medical History:     Past Medical History:   Diagnosis Date     Benign paroxysmal positional vertigo, left 7/10/2019     HLD (hyperlipidemia)      PVC's (premature ventricular contractions)      SI (sacroiliac) joint dysfunction             Past Surgical History:     Past Surgical History:   Procedure Laterality Date     Bunion surgery  2017      SECTION  5 years ago, 35 years ago     ELBOW SURGERY Right      MAMMOPLASTY AUGMENTATION  23 years ago     OPTICAL  "TRACKING SYSTEM FUSION SACRAL ILIAC Right 8/22/2019    Procedure: Right Minimal Invasive Sacral Iliac Joint Fusion;  Surgeon: Mac Urbina MD;  Location: [facility] OR            Social History:     Social History     Tobacco Use     Smoking status: Never Smoker     Smokeless tobacco: Never Used   Substance Use Topics     Alcohol use: Not Currently            Family History:     Family History   Problem Relation Age of Onset     Scleroderma Mother      Chronic Obstructive Pulmonary Disease Father      Heart Disease Father      Hyperlipidemia Father      Alzheimer Disease Maternal Grandmother      Parkinsonism Maternal Grandfather      Mental Illness Brother             Allergies:     Allergies   Allergen Reactions     Bees Shortness Of Breath     Cellulitis of the leg            Medications:     Current Outpatient Medications   Medication     Calcium 200 MG TABS     cyclobenzaprine (FLEXERIL) 5 MG tablet     diclofenac (VOLTAREN) 75 MG EC tablet     EPINEPHrine (EPIPEN/ADRENACLICK/OR ANY BX GENERIC EQUIV) 0.3 MG/0.3ML injection 2-pack     escitalopram (LEXAPRO) 10 MG tablet     estradiol (VIVELLE-DOT) 0.05 MG/24HR bi-weekly patch     LORazepam (ATIVAN) 0.5 MG tablet     order for DME     progesterone (PROMETRIUM) 100 MG capsule     rosuvastatin (CRESTOR) 10 MG tablet     vitamin D3 (CHOLECALCIFEROL) 2000 units (50 mcg) tablet     No current facility-administered medications for this visit.              Review of Systems:   A focused musculoskeletal and neurologic ROS was performed with pertinent positives and negatives noted in the HPI.  Additional systems were also reviewed and are documented at the bottom of the note.         Physical Exam:   Vitals: Ht 1.651 m (5' 5\")   Wt 72.6 kg (160 lb)   BMI 26.63 kg/m    Musculoskeletal, Neurologic, and Spine:        Lumbar Spine:    Appearance - No gross stepoffs or deformities    Motor -     L2-3: Hip flexion 5/5 R and 5/5 L strength          L3/4:  Knee extension " R 5/5 and L 5/5 strength         L4/5:  Foot dorsiflexion R 5/5 L 5/5 and       EHL dorsiflexion R 5/5 L 5/5 strength         S1:  Plantarflexion/Peroneal Muscles  R 5/5 and L 5/5 strength    Sensation: intact to light touch L3-S1 distribution BLE      Neurologic:      REFLEXES Left Right                  Patella 1+ 1+   Ankle jerk 1+ 1+   Babinski No upgoing great toe No upgoing great toe   Clonus 0 beats 0 beats     I examined her right sacroiliac joint.  She has no pain with hip logroll.  She has positive pain with thigh thrust anterior compression and Gaenslen's sign.  Mild pain when I relieve the lateral compression.  She is mildly tender over the greater trochanter.    Alignment:  Patient stands with a neutral standing sagittal balance.           Imaging:   We ordered and independently reviewed new radiographs at this clinic visit. The results were discussed with the patient. Findings include:     I reviewed the pelvic CT scan obtained after the last visit which does not show any obvious clear bony bridging across the sacroiliac joint.  On the coronal and axial views there is a slight halo around several of the implants.    I also reviewed her previous lumbar MRI which shows mild degenerative changes but no severe stenosis.  We also have pelvic and lumbar radiographs, which show the implants in place with no obvious complication and no obvious lumbar deformity.    She also had a April lumbar CT scan, which again shows lumbar spondylosis       Assessment and Plan:     60 year old female with recurrent right sacroiliac joint pain, concerning for nonunion given the positive provocative physical exam and response to injection as well as the appearance of the CT scan.    We discussed options at this time.  She feels very disabled by the symptoms.  She is been doing anti-inflammatories physical therapy and unfortunately has a surgery that appears that has not healed.  She is also tried an SI joint belt and a series  of injections.      We talked about options for revising this.  It can be done anteriorly through an ilioinguinal approach, the downside of which is a fairly extensive muscle dissection, or posteriorly with an attempt at posterior decortication and grafting of the joint.  In either case it would be necessary to try and placed iliosacral screws across the joint.  This would be done with Stealth navigation.  It is not possible to do a revision minimally invasive fusion.  She is already had a minimally invasive fusion attempt.  Given that this has not healed it will be necessary to do an open fusion and decortication of the joint.    I explained the main risk of the surgery is that she may not get full pain relief.  I quoted her about a 50% chance of improvement.  There is a small chance of infection small chance of implant malposition small chance of neurovascular or bowel or vessel injury with implant placement and the risks of anesthesia not limited to but including up to stroke heart attack and death.    We talked about the recovery and I think it would be similar to her previous surgery although probably some more muscle type pain given an open incision would be required.  She is interested in proceeding and I will work with our insurance people to try and get approval for her.           Respectfully,  Mac Urbina MD  Spine Surgery  [facility]    Answers for HPI/ROS submitted by the patient on 4/20/2021   General Symptoms: Yes  Skin Symptoms: Yes  HENT Symptoms: No  EYE SYMPTOMS: No  HEART SYMPTOMS: No  LUNG SYMPTOMS: No  INTESTINAL SYMPTOMS: No  URINARY SYMPTOMS: No  GYNECOLOGIC SYMPTOMS: No  BREAST SYMPTOMS: No  SKELETAL SYMPTOMS: Yes  BLOOD SYMPTOMS: No  NERVOUS SYSTEM SYMPTOMS: Yes  MENTAL HEALTH SYMPTOMS: No  Fever: No  Loss of appetite: No  Weight loss: No  Weight gain: No  Fatigue: Yes  Night sweats: Yes  Chills: No  Increased stress: No  Excessive hunger: No  Excessive thirst:  No  Feeling hot or cold when others believe the temperature is normal: No  Loss of height: No  Post-operative complications: Yes  Surgical site pain: Yes  Hallucinations: No  Change in or Loss of Energy: No  Hyperactivity: No  Confusion: No  Changes in hair: No  Changes in moles/birth marks: No  Itching: Yes  Rashes: Yes  Changes in nails: No  Acne: No  Hair in places you don't want it: No  Change in facial hair: No  Warts: No  Non-healing sores: No  Scarring: No  Flaking of skin: No  Color changes of hands/feet in cold : No  Sun sensitivity: No  Skin thickening: No  Back pain: Yes  Muscle aches: Yes  Neck pain: Yes  Swollen joints: No  Joint pain: Yes  Bone pain: Yes  Muscle cramps: No  Muscle weakness: Yes  Joint stiffness: Yes  Bone fracture: No  Trouble with coordination: No  Dizziness or trouble with balance: No  Fainting or black-out spells: No  Memory loss: No  Headache: Yes  Seizures: No  Speech problems: No  Tingling: Yes  Tremor: No  Weakness: Yes  Difficulty walking: Yes  Paralysis: No  Numbness: No

## 2021-05-04 NOTE — NURSING NOTE
"Reason For Visit:   Chief Complaint   Patient presents with     RECHECK     follow up on Pelvis and lumbar CT patient stated that her smptoms are getting worse and she had the injection with relief for about 1 week then symptoms came back        Primary MD: Logan Dias  Ref. MD: Jules     ?  No  Date of surgery: Dr. Urbina   Type of surgery: Dr. Urbina .  Smoker: No  Request smoking cessation information: No    Ht 1.651 m (5' 5\")   Wt 72.6 kg (160 lb)   BMI 26.63 kg/m           Oswestry (NIKHIL) Questionnaire    OSWESTRY DISABILITY INDEX 4/20/2021   Count 9   Sum 16   Oswestry Score (%) 35.56            Neck Disability Index (NDI) Questionnaire    No flowsheet data found.                Promis 10 Assessment    PROMIS 10 4/20/2021   In general, would you say your health is: Good   In general, would you say your quality of life is: Good   In general, how would you rate your physical health? Good   In general, how would you rate your mental health, including your mood and your ability to think? Very good   In general, how would you rate your satisfaction with your social activities and relationships? Good   In general, please rate how well you carry out your usual social activities and roles Good   To what extent are you able to carry out your everyday physical activities such as walking, climbing stairs, carrying groceries, or moving a chair? Moderately   How often have you been bothered by emotional problems such as feeling anxious, depressed or irritable? Rarely   How would you rate your fatigue on average? Mild   How would you rate your pain on average?   0 = No Pain  to  10 = Worst Imaginable Pain 4   In general, would you say your health is: 3   In general, would you say your quality of life is: 3   In general, how would you rate your physical health? 3   In general, how would you rate your mental health, including your mood and your ability to think? 4   In general, how would you rate your " satisfaction with your social activities and relationships? 3   In general, please rate how well you carry out your usual social activities and roles. (This includes activities at home, at work and in your community, and responsibilities as a parent, child, spouse, employee, friend, etc.) 3   To what extent are you able to carry out your everyday physical activities such as walking, climbing stairs, carrying groceries, or moving a chair? 3   In the past 7 days, how often have you been bothered by emotional problems such as feeling anxious, depressed, or irritable? 2   In the past 7 days, how would you rate your fatigue on average? 2   In the past 7 days, how would you rate your pain on average, where 0 means no pain, and 10 means worst imaginable pain? 4   Global Mental Health Score 14   Global Physical Health Score 13   PROMIS TOTAL - SUBSCORES 27                Tae Canseco ATC

## 2021-05-24 ENCOUNTER — TELEPHONE (OUTPATIENT)
Dept: ORTHOPEDICS | Facility: CLINIC | Age: 61
End: 2021-05-24

## 2021-05-24 PROBLEM — M53.3 SI (SACROILIAC) JOINT DYSFUNCTION: Status: ACTIVE | Noted: 2021-05-24

## 2021-05-24 NOTE — TELEPHONE ENCOUNTER
Patient is scheduled for surgery with Dr. Urbina    Spoke with: Patient    Date of Surgery: 8/13/21    Location: [place]    Post op: 6 weeks    Pre op with Provider: Complete    H&P: Scheduled with PAC 7/19/21    Pre-procedure COVID-19 Test: [place] 8/10/21    Additional imaging/appointments: N/A    Surgery packet: Received in clinic     Additional comments: N/A

## 2021-05-25 NOTE — TELEPHONE ENCOUNTER
FUTURE VISIT INFORMATION      SURGERY INFORMATION:    Date: 8/13/21    Location: [facility] OR    Surgeon:  Mac Urbina MD    Anesthesia Type:  General    Procedure: Revision right sacroiliac joint fusion with iliosacral screws and open posterior joint debridement and grafting, use of O-Arm/stealth navigation, allograft, local autograft, and bone morphogenic    Consult: ov 5/4    RECORDS REQUESTED FROM:       Primary Care Provider: Logan Dias    Most recent EKG+ Tracing: 3/16/17    Most recent ECHO: 3/16/17

## 2021-06-12 DIAGNOSIS — Z11.59 ENCOUNTER FOR SCREENING FOR OTHER VIRAL DISEASES: ICD-10-CM

## 2021-07-08 ENCOUNTER — TELEPHONE (OUTPATIENT)
Dept: ORTHOPEDICS | Facility: CLINIC | Age: 61
End: 2021-07-08

## 2021-07-08 NOTE — TELEPHONE ENCOUNTER
RN received VM from [facility] regarding patient.  RN called patient back but had to leave .  RN answered her questions regarding post op care for SI joint fusion. RN provided call back number.     Graciela Cotto RN

## 2021-07-19 ENCOUNTER — LAB (OUTPATIENT)
Dept: LAB | Facility: CLINIC | Age: 61
End: 2021-07-19
Payer: COMMERCIAL

## 2021-07-19 ENCOUNTER — ANESTHESIA EVENT (OUTPATIENT)
Dept: SURGERY | Facility: CLINIC | Age: 61
End: 2021-07-19

## 2021-07-19 ENCOUNTER — OFFICE VISIT (OUTPATIENT)
Dept: SURGERY | Facility: CLINIC | Age: 61
End: 2021-07-19
Payer: COMMERCIAL

## 2021-07-19 VITALS
DIASTOLIC BLOOD PRESSURE: 87 MMHG | BODY MASS INDEX: 27.99 KG/M2 | SYSTOLIC BLOOD PRESSURE: 135 MMHG | HEART RATE: 73 BPM | TEMPERATURE: 98.2 F | RESPIRATION RATE: 24 BRPM | WEIGHT: 168 LBS | HEIGHT: 65 IN | OXYGEN SATURATION: 98 %

## 2021-07-19 DIAGNOSIS — Z01.818 PREOP EXAMINATION: Primary | ICD-10-CM

## 2021-07-19 DIAGNOSIS — Z01.818 PREOP EXAMINATION: ICD-10-CM

## 2021-07-19 DIAGNOSIS — M53.3 SI (SACROILIAC) JOINT DYSFUNCTION: ICD-10-CM

## 2021-07-19 LAB
ABO/RH(D): NORMAL
ALBUMIN SERPL-MCNC: 4.2 G/DL (ref 3.4–5)
ALP SERPL-CCNC: 84 U/L (ref 40–150)
ALT SERPL W P-5'-P-CCNC: 22 U/L (ref 0–50)
ANION GAP SERPL CALCULATED.3IONS-SCNC: 4 MMOL/L (ref 3–14)
ANTIBODY SCREEN: NEGATIVE
AST SERPL W P-5'-P-CCNC: 11 U/L (ref 0–45)
BASOPHILS # BLD AUTO: 0.1 10E3/UL (ref 0–0.2)
BASOPHILS NFR BLD AUTO: 1 %
BILIRUB SERPL-MCNC: 0.3 MG/DL (ref 0.2–1.3)
BUN SERPL-MCNC: 18 MG/DL (ref 7–30)
CALCIUM SERPL-MCNC: 9.3 MG/DL (ref 8.5–10.1)
CHLORIDE BLD-SCNC: 107 MMOL/L (ref 94–109)
CO2 SERPL-SCNC: 27 MMOL/L (ref 20–32)
CREAT SERPL-MCNC: 0.62 MG/DL (ref 0.52–1.04)
EOSINOPHIL # BLD AUTO: 0.1 10E3/UL (ref 0–0.7)
EOSINOPHIL NFR BLD AUTO: 1 %
ERYTHROCYTE [DISTWIDTH] IN BLOOD BY AUTOMATED COUNT: 12.2 % (ref 10–15)
GFR SERPL CREATININE-BSD FRML MDRD: >90 ML/MIN/1.73M2
GLUCOSE BLD-MCNC: 90 MG/DL (ref 70–99)
HCT VFR BLD AUTO: 40.7 % (ref 35–47)
HGB BLD-MCNC: 13.6 G/DL (ref 11.7–15.7)
IMM GRANULOCYTES # BLD: 0 10E3/UL
IMM GRANULOCYTES NFR BLD: 0 %
LYMPHOCYTES # BLD AUTO: 1.7 10E3/UL (ref 0.8–5.3)
LYMPHOCYTES NFR BLD AUTO: 24 %
MCH RBC QN AUTO: 32.2 PG (ref 26.5–33)
MCHC RBC AUTO-ENTMCNC: 33.4 G/DL (ref 31.5–36.5)
MCV RBC AUTO: 96 FL (ref 78–100)
MONOCYTES # BLD AUTO: 0.6 10E3/UL (ref 0–1.3)
MONOCYTES NFR BLD AUTO: 9 %
NEUTROPHILS # BLD AUTO: 4.6 10E3/UL (ref 1.6–8.3)
NEUTROPHILS NFR BLD AUTO: 65 %
NRBC # BLD AUTO: 0 10E3/UL
NRBC BLD AUTO-RTO: 0 /100
PLATELET # BLD AUTO: 281 10E3/UL (ref 150–450)
POTASSIUM BLD-SCNC: 4.1 MMOL/L (ref 3.4–5.3)
PROT SERPL-MCNC: 7.9 G/DL (ref 6.8–8.8)
RBC # BLD AUTO: 4.22 10E6/UL (ref 3.8–5.2)
SODIUM SERPL-SCNC: 138 MMOL/L (ref 133–144)
SPECIMEN EXPIRATION DATE: NORMAL
WBC # BLD AUTO: 7.1 10E3/UL (ref 4–11)

## 2021-07-19 PROCEDURE — 85025 COMPLETE CBC W/AUTO DIFF WBC: CPT | Performed by: PATHOLOGY

## 2021-07-19 PROCEDURE — 86900 BLOOD TYPING SEROLOGIC ABO: CPT | Mod: 90 | Performed by: PATHOLOGY

## 2021-07-19 PROCEDURE — 86901 BLOOD TYPING SEROLOGIC RH(D): CPT | Mod: 90 | Performed by: PATHOLOGY

## 2021-07-19 PROCEDURE — 36415 COLL VENOUS BLD VENIPUNCTURE: CPT | Performed by: PATHOLOGY

## 2021-07-19 PROCEDURE — 99204 OFFICE O/P NEW MOD 45 MIN: CPT | Performed by: PHYSICIAN ASSISTANT

## 2021-07-19 PROCEDURE — 80053 COMPREHEN METABOLIC PANEL: CPT | Performed by: PATHOLOGY

## 2021-07-19 PROCEDURE — 86850 RBC ANTIBODY SCREEN: CPT | Mod: 90 | Performed by: PATHOLOGY

## 2021-07-19 ASSESSMENT — ENCOUNTER SYMPTOMS: SEIZURES: 0

## 2021-07-19 ASSESSMENT — PAIN SCALES - GENERAL: PAINLEVEL: MILD PAIN (3)

## 2021-07-19 ASSESSMENT — LIFESTYLE VARIABLES: TOBACCO_USE: 0

## 2021-07-19 ASSESSMENT — MIFFLIN-ST. JEOR: SCORE: 1332.92

## 2021-07-19 NOTE — H&P (VIEW-ONLY)
Pre-Operative H & P     CC:  Preoperative exam to assess for increased cardiopulmonary risk while undergoing surgery and anesthesia.    Date of Encounter: 7/19/2021  Primary Care Physician:  Logan Dias  Reason for Visit: SI (sacroiliac) joint dysfunction       HPI     Kristal Lester is a 61 y/o female who presents for pre-operative H&P in preparation for Revision right sacroiliac joint fusion with iliosacral screws and open posterior joint debridement and grafting, use of O-Arm/stealth navigation, allograft, local autograft, and bone morphogenic with Mac Urbina MD on 8/13/21 at [facility] for treatment of SI (sacroiliac) joint dysfunction.     Ms. Lester has a history of right SI joint pain. She underwent Right Minimally Invasive SI Joint Fusion with O-Arm/Stealth with Dr. Urbina on 8/22/19, which helped with her symptoms for about a year.  She now says that it feels like she never had surgery and things are back to the way they were before.  Her exam is consistent with recurrent sacroiliac joint pathology.  A CT-guided right sacroiliac joint injection helped her for approximately 1 week with complete symptom relief, but now it has come back again.  She is having difficulty standing and walking particularly being seated for a long period of time.  She is very bothered by the symptoms and feels like she cannot be active and do the things she wants to do because of the disabling nature of the symptoms. She now presents for the above procedure.    PMH is also significant for PONV, motion sickness, BPPV & claustrophobia    History was obtained from patient & chart review.     Past Medical History  Past Medical History:   Diagnosis Date     Benign paroxysmal positional vertigo, left 7/10/2019     HLD (hyperlipidemia)      PVC's (premature ventricular contractions)      SI (sacroiliac) joint dysfunction        Past Surgical History  Past Surgical History:    Procedure Laterality Date     Bunion surgery  2017      SECTION  5 years ago, 35 years ago     ELBOW SURGERY Right      MAMMOPLASTY AUGMENTATION  23 years ago     OPTICAL TRACKING SYSTEM FUSION SACRAL ILIAC Right 2019    Procedure: Right Minimal Invasive Sacral Iliac Joint Fusion;  Surgeon: Mac Urbina MD;  Location: [facility] OR       Hx of Blood transfusions/reactions: denies     Hx of abnormal bleeding or anti-platelet use: denies    Menstrual history: No LMP recorded. Patient is postmenopausal.:      Steroid use in the last year: denies    Personal or FH with difficulty with Anesthesia:  + motion sickness and severe PONV.    Prior to Admission Medications  Current Outpatient Medications   Medication Sig Dispense Refill     Calcium 200 MG TABS Take by mouth every morning        cyclobenzaprine (FLEXERIL) 5 MG tablet Take 1-2 tablets (5-10 mg) by mouth nightly as needed for muscle spasms 45 tablet 0     diphenhydrAMINE-APAP, sleep, (TYLENOL PM EXTRA STRENGTH PO) Take by mouth nightly as needed       EPINEPHrine (EPIPEN/ADRENACLICK/OR ANY BX GENERIC EQUIV) 0.3 MG/0.3ML injection 2-pack Inject 0.3 mg into the muscle as needed for anaphylaxis       escitalopram (LEXAPRO) 10 MG tablet Take 10 mg by mouth every morning        estradiol (VIVELLE-DOT) 0.05 MG/24HR bi-weekly patch Place 1 patch onto the skin twice a week        LORazepam (ATIVAN) 0.5 MG tablet Take 0.5 mg by mouth At Bedtime        progesterone (PROMETRIUM) 100 MG capsule Take 100 mg by mouth At Bedtime        rosuvastatin (CRESTOR) 10 MG tablet Take 10 mg by mouth At Bedtime       vitamin D3 (CHOLECALCIFEROL) 2000 units (50 mcg) tablet Take 1 tablet by mouth every morning        diclofenac (VOLTAREN) 75 MG EC tablet Take 1 tablet (75 mg) by mouth 2 times daily as needed (Patient taking differently: Take 75 mg by mouth 2 times daily as needed ) 40 tablet 1     order for DME SI-loc belt 1 Units 0       Allergies  Allergies    Allergen Reactions     Bees Shortness Of Breath     Cellulitis of the leg       Social History  Social History     Socioeconomic History     Marital status:      Spouse name: Not on file     Number of children: Not on file     Years of education: Not on file     Highest education level: Not on file   Occupational History     Not on file   Tobacco Use     Smoking status: Never Smoker     Smokeless tobacco: Never Used   Substance and Sexual Activity     Alcohol use: Not Currently     Drug use: Not Currently     Sexual activity: Not on file   Other Topics Concern     Not on file   Social History Narrative     Not on file     Social Determinants of Health     Financial Resource Strain:      Difficulty of Paying Living Expenses:    Food Insecurity:      Worried About Running Out of Food in the Last Year:      Ran Out of Food in the Last Year:    Transportation Needs:      Lack of Transportation (Medical):      Lack of Transportation (Non-Medical):    Physical Activity:      Days of Exercise per Week:      Minutes of Exercise per Session:    Stress:      Feeling of Stress :    Social Connections:      Frequency of Communication with Friends and Family:      Frequency of Social Gatherings with Friends and Family:      Attends [employer] Services:      Active Member of Clubs or Organizations:      Attends Club or Organization Meetings:      Marital Status:    Intimate Partner Violence:      Fear of Current or Ex-Partner:      Emotionally Abused:      Physically Abused:      Sexually Abused:        Family History  Family History   Problem Relation Age of Onset     Chronic Obstructive Pulmonary Disease Father      Heart Disease Father      Hyperlipidemia Father      Scleroderma Mother      Mental Illness Brother      Alzheimer Disease Maternal Grandmother      Parkinsonism Maternal Grandfather      Anesthesia Reaction No family hx of      Deep Vein Thrombosis (DVT) No family hx of            ROS/MED HX  The complete  review of systems is negative other than noted in the HPI or here.  Patient denies recent illness, fever and respiratory infection during past month.  Pt denies steroid use during past year.    ENT/Pulmonary:  - neg pulmonary ROS  (-) tobacco use, asthma and sleep apnea   Neurologic: Comment: BPPV     (-) no seizures and no CVA   Cardiovascular: Comment: Holter monitor 2/24-25/17  Sinus rhythm with 12,651 PVCs, 13% of all beats. No evidence for sustained arrythmias.      (+) -----Previous cardiac testing   Echo: Date: Results:    Stress Test: Date: 2017 Results:  1. Normal stress echocardiogram with no inducible wall motion abnormalities at stress.   2. No diagnostic ischemic EKG changes during stress.   3. Occasional PVCs with rest extinguished with effort.  4. LV function normal. Visually estimated EF 60-65% at rest.  5. Normal regional wall motion at rest.   6. Normal heart rate and blood pressure response to stress.   7. No pericardial effusion.   8. No anginal symptoms with exercise.  9. Target heart rate achieved.   10. Normal exercise capacity.   11. Oxygen saturation remains normal with stress    ECG Reviewed: Date: 2017 Results:  Sinus rhythm with frequent PVCs  Cath: Date: Results:      METS/Exercise Tolerance: 4 - Raking leaves, gardening Comment: Walks dog 6x/day, uses recumbant bike x45 min regularly.    Hematologic:    (-) history of blood clots and history of blood transfusion   Musculoskeletal: Comment: SI joint dysfunction    Cervical arthritis         GI/Hepatic: Comment: Mild GERD, untreated      (+) cholecystitis/cholelithiasis,  (-) liver disease   Renal/Genitourinary:  - neg Renal ROS     Endo:  - neg endo ROS  (-) Type II DM   Psychiatric/Substance Use: Comment: Claustrophobia        Infectious Disease:  - neg infectious disease ROS     Malignancy:  - neg malignancy ROS     Other:  - neg other ROS              Temp: 98.2  F (36.8  C) Temp src: Oral BP: 135/87 Pulse: 73   Resp: 24 SpO2: 98 %     "     168 lbs 0 oz  5' 5\"[PT REPORTED[   Body mass index is 27.96 kg/m .       Physical Exam  Constitutional: Awake, alert, cooperative, no apparent distress, and appears stated age.  Eyes: Pupils equal, round and reactive to light, extra ocular muscles intact, sclera clear, conjunctiva normal.  HENT: Normocephalic, oral pharynx with moist mucus membranes, good dentition. No goiter appreciated. No removable dental hardware.  Respiratory: Clear to auscultation bilaterally, no crackles or wheezing. No SOB when supine.  Cardiovascular: Regular rate and rhythm, normal S1 and S2, and no murmur noted.  Carotids +2, no bruits. No edema. Palpable pulses to radial, DP and PT arteries.   GI: Normal bowel sounds, soft, non-distended, non-tender, no masses palpated.    Lymph/Hematologic: No cervical lymphadenopathy and no supraclavicular lymphadenopathy.  Genitourinary:  deferred  Skin: Warm and dry.  No rashes.   Musculoskeletal: full ROM of neck. There is no redness, warmth, or swelling of the joints. Gross motor strength is normal, w/ exception of RLE diminished strength.    Neurologic: Awake, alert, oriented to name, place and time. Cranial nerves II-XII are grossly intact. Gait is normal. Lies supine and sits back up w/o assistance.  Neuropsychiatric: Calm, cooperative. Normal affect. Pleasant. Answers questions appropriately, follows commands w/o difficulty.        PRIOR LABS/DIAGNOSTIC STUDIES:    All labs and imaging personally reviewed      CT LUMBAR SPINE W/O CONTRAST 4/9/2021  Impression:  1. Right sacroiliac joint fusion procedure without complicating  process. Little osseous fusion across the joint space. Left sacroiliac  joint osteoarthritis particularly at the inferior aspect of the joint.  2. Mild to moderate spondylosis about the lumbar spine as above not  significantly changed since MRI 3/4/2021 given differences in  technique.      EKG 2017: Personally reviewed: Sinus rhythm with frequent PVCs     Exercise " stress echo 3/16/17  Summary:  1. Normal stress echocardiogram with no inducible wall motion abnormalities at stress.   2. No diagnostic ischemic EKG changes during stress.   3. Occasional PVCs with rest extinguished with effort.  4. LV function normal. Visually estimated EF 60-65% at rest.  5. Normal regional wall motion at rest.   6. Normal heart rate and blood pressure response to stress.   7. No pericardial effusion.   8. No anginal symptoms with exercise.  9. Target heart rate achieved.   10. Normal exercise capacity.   11. Oxygen saturation remains normal with stress      Holter monitor 2/24-25/17  Sinus rhythm with 12,651 PVCs, 13% of all beats. No evidence for sustained arrythmias.       CBC:   Lab Results   Component Value Date    HGB 13.9 08/22/2019     BMP:   Lab Results   Component Value Date     (H) 08/22/2019     COAGS: No results found for: PTT, INR, FIBR  POC:   Lab Results   Component Value Date    HCG Negative 08/22/2019       Labs today: (personally reviewed)  CMP, CBC, T&S    Sodium 133 - 144 mmol/L 138      Potassium 3.4 - 5.3 mmol/L 4.1     Chloride 94 - 109 mmol/L 107     Carbon Dioxide (CO2) 20 - 32 mmol/L 27     Anion Gap 3 - 14 mmol/L 4     Urea Nitrogen 7 - 30 mg/dL 18     Creatinine 0.52 - 1.04 mg/dL 0.62     Calcium 8.5 - 10.1 mg/dL 9.3     Glucose 70 - 99 mg/dL 90     Alkaline Phosphatase 40 - 150 U/L 84     AST 0 - 45 U/L 11     ALT 0 - 50 U/L 22     Protein Total 6.8 - 8.8 g/dL 7.9     Albumin 3.4 - 5.0 g/dL 4.2     Bilirubin Total 0.2 - 1.3 mg/dL 0.3     GFR Estimate >60 mL/min/1.73m2 >90        WBC Count 4.0 - 11.0 10e3/uL 7.1      RBC Count 3.80 - 5.20 10e6/uL 4.22     Hemoglobin 11.7 - 15.7 g/dL 13.6     Hematocrit 35.0 - 47.0 % 40.7     MCV 78 - 100 fL 96     MCH 26.5 - 33.0 pg 32.2     MCHC 31.5 - 36.5 g/dL 33.4     RDW 10.0 - 15.0 % 12.2     Platelet Count 150 - 450 10e3/uL 281     % Neutrophils % 65     % Lymphocytes % 24     % Monocytes % 9     % Eosinophils % 1     %  Basophils % 1     % Immature Granulocytes % 0     NRBCs per 100 WBC <1 /100 0     Absolute Neutrophils 1.6 - 8.3 10e3/uL 4.6     Absolute Lymphocytes 0.8 - 5.3 10e3/uL 1.7     Absolute Monocytes 0.0 - 1.3 10e3/uL 0.6     Absolute Eosinophils 0.0 - 0.7 10e3/uL 0.1     Absolute Basophils 0.0 - 0.2 10e3/uL 0.1     Absolute Immature Granulocytes <=0.0 10e3/uL 0.0     Absolute NRBCs 10e3/uL 0.0        COVID19 testing scheduled on 8/10/21        ASSESSMENT and PLAN  Kristal Lester is a 60 year old female scheduled to undergo Revision right sacroiliac joint fusion with iliosacral screws and open posterior joint debridement and grafting, use of O-Arm/stealth navigation, allograft, local autograft, and bone morphogenic with Mac Urbina MD on 8/13/21 at [facility] for treatment of SI (sacroiliac) joint dysfunction.     Pt has had prior anesthetic.     History of anesthetic complications:  + motion sickness and severe PONV.    She has the following specific operative considerations:   # CANDIDO 1/8 = low risk  # VTE risk: 0.26%  # Risk of PONV score = 4.  If > 2, anti-emetic intervention recommended.  # Anesthesia considerations:  Refer to PAC assessment in anesthesia records    # Increased risk of postoperative nausea/vomiting: Recommend use of antiemetic agents in the perioperative period.        CARDIAC: METS >4,  Walks dog 6x/day, uses recumbant bike x45 min regularly.      # RCRI : No serious cardiac risks.  0.4% risk of major adverse cardiac event.     #  Stress test 2017: no ischemia     #  Holter monitor 2017: Sinus rhythm with 12,651 PVCs, 13% of all beats. No evidence for sustained arrythmias.       PULMONARY:     # Never smoked    # Denies asthma or inhaler use    GI:     # Mild GERD, untreated    # Gallstones    ENDO: BMI 27    # No DM    ORTHO:     # full ROM of neck    NEURO/PSYCH:     # BPPV    # Claustrophobia      Patient is optimized and is acceptable  candidate for the proposed procedure. No further diagnostic evaluation is needed.      Final plan per anesthesiologist on day of surgery.     Arrival time, NPO, shower and medication instructions provided by nursing staff today.  Preparing For Your Surgery handout given.    47 minutes spent on the date of the encounter doing chart review, history and exam, documentation and further activities as noted below:    Prep time: 12 minutes  Visit time: 25 minutes  Documentation time: 10 minutes      Joanne Jara PA-C  Preoperative Assessment Center  [facility]  Phone: [phone]  Fax: [phone]

## 2021-07-19 NOTE — H&P
Pre-Operative H & P     CC:  Preoperative exam to assess for increased cardiopulmonary risk while undergoing surgery and anesthesia.    Date of Encounter: 7/19/2021  Primary Care Physician:  Logan Dias  Reason for Visit: SI (sacroiliac) joint dysfunction       HPI     Kristal Lester is a 61 y/o female who presents for pre-operative H&P in preparation for Revision right sacroiliac joint fusion with iliosacral screws and open posterior joint debridement and grafting, use of O-Arm/stealth navigation, allograft, local autograft, and bone morphogenic with Mac Urbina MD on 8/13/21 at [facility] for treatment of SI (sacroiliac) joint dysfunction.     Ms. Lester has a history of right SI joint pain. She underwent Right Minimally Invasive SI Joint Fusion with O-Arm/Stealth with Dr. Urbina on 8/22/19, which helped with her symptoms for about a year.  She now says that it feels like she never had surgery and things are back to the way they were before.  Her exam is consistent with recurrent sacroiliac joint pathology.  A CT-guided right sacroiliac joint injection helped her for approximately 1 week with complete symptom relief, but now it has come back again.  She is having difficulty standing and walking particularly being seated for a long period of time.  She is very bothered by the symptoms and feels like she cannot be active and do the things she wants to do because of the disabling nature of the symptoms. She now presents for the above procedure.    PMH is also significant for PONV, motion sickness, BPPV & claustrophobia    History was obtained from patient & chart review.     Past Medical History  Past Medical History:   Diagnosis Date     Benign paroxysmal positional vertigo, left 7/10/2019     HLD (hyperlipidemia)      PVC's (premature ventricular contractions)      SI (sacroiliac) joint dysfunction        Past Surgical History  Past Surgical History:    Procedure Laterality Date     Bunion surgery  2017      SECTION  5 years ago, 35 years ago     ELBOW SURGERY Right      MAMMOPLASTY AUGMENTATION  23 years ago     OPTICAL TRACKING SYSTEM FUSION SACRAL ILIAC Right 2019    Procedure: Right Minimal Invasive Sacral Iliac Joint Fusion;  Surgeon: Mac Urbina MD;  Location: [facility] OR       Hx of Blood transfusions/reactions: denies     Hx of abnormal bleeding or anti-platelet use: denies    Menstrual history: No LMP recorded. Patient is postmenopausal.:      Steroid use in the last year: denies    Personal or FH with difficulty with Anesthesia:  + motion sickness and severe PONV.    Prior to Admission Medications  Current Outpatient Medications   Medication Sig Dispense Refill     Calcium 200 MG TABS Take by mouth every morning        cyclobenzaprine (FLEXERIL) 5 MG tablet Take 1-2 tablets (5-10 mg) by mouth nightly as needed for muscle spasms 45 tablet 0     diphenhydrAMINE-APAP, sleep, (TYLENOL PM EXTRA STRENGTH PO) Take by mouth nightly as needed       EPINEPHrine (EPIPEN/ADRENACLICK/OR ANY BX GENERIC EQUIV) 0.3 MG/0.3ML injection 2-pack Inject 0.3 mg into the muscle as needed for anaphylaxis       escitalopram (LEXAPRO) 10 MG tablet Take 10 mg by mouth every morning        estradiol (VIVELLE-DOT) 0.05 MG/24HR bi-weekly patch Place 1 patch onto the skin twice a week        LORazepam (ATIVAN) 0.5 MG tablet Take 0.5 mg by mouth At Bedtime        progesterone (PROMETRIUM) 100 MG capsule Take 100 mg by mouth At Bedtime        rosuvastatin (CRESTOR) 10 MG tablet Take 10 mg by mouth At Bedtime       vitamin D3 (CHOLECALCIFEROL) 2000 units (50 mcg) tablet Take 1 tablet by mouth every morning        diclofenac (VOLTAREN) 75 MG EC tablet Take 1 tablet (75 mg) by mouth 2 times daily as needed (Patient taking differently: Take 75 mg by mouth 2 times daily as needed ) 40 tablet 1     order for DME SI-loc belt 1 Units 0       Allergies  Allergies    Allergen Reactions     Bees Shortness Of Breath     Cellulitis of the leg       Social History  Social History     Socioeconomic History     Marital status:      Spouse name: Not on file     Number of children: Not on file     Years of education: Not on file     Highest education level: Not on file   Occupational History     Not on file   Tobacco Use     Smoking status: Never Smoker     Smokeless tobacco: Never Used   Substance and Sexual Activity     Alcohol use: Not Currently     Drug use: Not Currently     Sexual activity: Not on file   Other Topics Concern     Not on file   Social History Narrative     Not on file     Social Determinants of Health     Financial Resource Strain:      Difficulty of Paying Living Expenses:    Food Insecurity:      Worried About Running Out of Food in the Last Year:      Ran Out of Food in the Last Year:    Transportation Needs:      Lack of Transportation (Medical):      Lack of Transportation (Non-Medical):    Physical Activity:      Days of Exercise per Week:      Minutes of Exercise per Session:    Stress:      Feeling of Stress :    Social Connections:      Frequency of Communication with Friends and Family:      Frequency of Social Gatherings with Friends and Family:      Attends Synagogue Services:      Active Member of Clubs or Organizations:      Attends Club or Organization Meetings:      Marital Status:    Intimate Partner Violence:      Fear of Current or Ex-Partner:      Emotionally Abused:      Physically Abused:      Sexually Abused:        Family History  Family History   Problem Relation Age of Onset     Chronic Obstructive Pulmonary Disease Father      Heart Disease Father      Hyperlipidemia Father      Scleroderma Mother      Mental Illness Brother      Alzheimer Disease Maternal Grandmother      Parkinsonism Maternal Grandfather      Anesthesia Reaction No family hx of      Deep Vein Thrombosis (DVT) No family hx of            ROS/MED HX  The complete  review of systems is negative other than noted in the HPI or here.  Patient denies recent illness, fever and respiratory infection during past month.  Pt denies steroid use during past year.    ENT/Pulmonary:  - neg pulmonary ROS  (-) tobacco use, asthma and sleep apnea   Neurologic: Comment: BPPV     (-) no seizures and no CVA   Cardiovascular: Comment: Holter monitor 2/24-25/17  Sinus rhythm with 12,651 PVCs, 13% of all beats. No evidence for sustained arrythmias.      (+) -----Previous cardiac testing   Echo: Date: Results:    Stress Test: Date: 2017 Results:  1. Normal stress echocardiogram with no inducible wall motion abnormalities at stress.   2. No diagnostic ischemic EKG changes during stress.   3. Occasional PVCs with rest extinguished with effort.  4. LV function normal. Visually estimated EF 60-65% at rest.  5. Normal regional wall motion at rest.   6. Normal heart rate and blood pressure response to stress.   7. No pericardial effusion.   8. No anginal symptoms with exercise.  9. Target heart rate achieved.   10. Normal exercise capacity.   11. Oxygen saturation remains normal with stress    ECG Reviewed: Date: 2017 Results:  Sinus rhythm with frequent PVCs  Cath: Date: Results:      METS/Exercise Tolerance: 4 - Raking leaves, gardening Comment: Walks dog 6x/day, uses recumbant bike x45 min regularly.    Hematologic:    (-) history of blood clots and history of blood transfusion   Musculoskeletal: Comment: SI joint dysfunction    Cervical arthritis         GI/Hepatic: Comment: Mild GERD, untreated      (+) cholecystitis/cholelithiasis,  (-) liver disease   Renal/Genitourinary:  - neg Renal ROS     Endo:  - neg endo ROS  (-) Type II DM   Psychiatric/Substance Use: Comment: Claustrophobia        Infectious Disease:  - neg infectious disease ROS     Malignancy:  - neg malignancy ROS     Other:  - neg other ROS              Temp: 98.2  F (36.8  C) Temp src: Oral BP: 135/87 Pulse: 73   Resp: 24 SpO2: 98 %     "     168 lbs 0 oz  5' 5\"[PT REPORTED[   Body mass index is 27.96 kg/m .       Physical Exam  Constitutional: Awake, alert, cooperative, no apparent distress, and appears stated age.  Eyes: Pupils equal, round and reactive to light, extra ocular muscles intact, sclera clear, conjunctiva normal.  HENT: Normocephalic, oral pharynx with moist mucus membranes, good dentition. No goiter appreciated. No removable dental hardware.  Respiratory: Clear to auscultation bilaterally, no crackles or wheezing. No SOB when supine.  Cardiovascular: Regular rate and rhythm, normal S1 and S2, and no murmur noted.  Carotids +2, no bruits. No edema. Palpable pulses to radial, DP and PT arteries.   GI: Normal bowel sounds, soft, non-distended, non-tender, no masses palpated.    Lymph/Hematologic: No cervical lymphadenopathy and no supraclavicular lymphadenopathy.  Genitourinary:  deferred  Skin: Warm and dry.  No rashes.   Musculoskeletal: full ROM of neck. There is no redness, warmth, or swelling of the joints. Gross motor strength is normal, w/ exception of RLE diminished strength.    Neurologic: Awake, alert, oriented to name, place and time. Cranial nerves II-XII are grossly intact. Gait is normal. Lies supine and sits back up w/o assistance.  Neuropsychiatric: Calm, cooperative. Normal affect. Pleasant. Answers questions appropriately, follows commands w/o difficulty.        PRIOR LABS/DIAGNOSTIC STUDIES:    All labs and imaging personally reviewed      CT LUMBAR SPINE W/O CONTRAST 4/9/2021  Impression:  1. Right sacroiliac joint fusion procedure without complicating  process. Little osseous fusion across the joint space. Left sacroiliac  joint osteoarthritis particularly at the inferior aspect of the joint.  2. Mild to moderate spondylosis about the lumbar spine as above not  significantly changed since MRI 3/4/2021 given differences in  technique.      EKG 2017: Personally reviewed: Sinus rhythm with frequent PVCs     Exercise " stress echo 3/16/17  Summary:  1. Normal stress echocardiogram with no inducible wall motion abnormalities at stress.   2. No diagnostic ischemic EKG changes during stress.   3. Occasional PVCs with rest extinguished with effort.  4. LV function normal. Visually estimated EF 60-65% at rest.  5. Normal regional wall motion at rest.   6. Normal heart rate and blood pressure response to stress.   7. No pericardial effusion.   8. No anginal symptoms with exercise.  9. Target heart rate achieved.   10. Normal exercise capacity.   11. Oxygen saturation remains normal with stress      Holter monitor 2/24-25/17  Sinus rhythm with 12,651 PVCs, 13% of all beats. No evidence for sustained arrythmias.       CBC:   Lab Results   Component Value Date    HGB 13.9 08/22/2019     BMP:   Lab Results   Component Value Date     (H) 08/22/2019     COAGS: No results found for: PTT, INR, FIBR  POC:   Lab Results   Component Value Date    HCG Negative 08/22/2019       Labs today: (personally reviewed)  CMP, CBC, T&S    Sodium 133 - 144 mmol/L 138      Potassium 3.4 - 5.3 mmol/L 4.1     Chloride 94 - 109 mmol/L 107     Carbon Dioxide (CO2) 20 - 32 mmol/L 27     Anion Gap 3 - 14 mmol/L 4     Urea Nitrogen 7 - 30 mg/dL 18     Creatinine 0.52 - 1.04 mg/dL 0.62     Calcium 8.5 - 10.1 mg/dL 9.3     Glucose 70 - 99 mg/dL 90     Alkaline Phosphatase 40 - 150 U/L 84     AST 0 - 45 U/L 11     ALT 0 - 50 U/L 22     Protein Total 6.8 - 8.8 g/dL 7.9     Albumin 3.4 - 5.0 g/dL 4.2     Bilirubin Total 0.2 - 1.3 mg/dL 0.3     GFR Estimate >60 mL/min/1.73m2 >90        WBC Count 4.0 - 11.0 10e3/uL 7.1      RBC Count 3.80 - 5.20 10e6/uL 4.22     Hemoglobin 11.7 - 15.7 g/dL 13.6     Hematocrit 35.0 - 47.0 % 40.7     MCV 78 - 100 fL 96     MCH 26.5 - 33.0 pg 32.2     MCHC 31.5 - 36.5 g/dL 33.4     RDW 10.0 - 15.0 % 12.2     Platelet Count 150 - 450 10e3/uL 281     % Neutrophils % 65     % Lymphocytes % 24     % Monocytes % 9     % Eosinophils % 1     %  Basophils % 1     % Immature Granulocytes % 0     NRBCs per 100 WBC <1 /100 0     Absolute Neutrophils 1.6 - 8.3 10e3/uL 4.6     Absolute Lymphocytes 0.8 - 5.3 10e3/uL 1.7     Absolute Monocytes 0.0 - 1.3 10e3/uL 0.6     Absolute Eosinophils 0.0 - 0.7 10e3/uL 0.1     Absolute Basophils 0.0 - 0.2 10e3/uL 0.1     Absolute Immature Granulocytes <=0.0 10e3/uL 0.0     Absolute NRBCs 10e3/uL 0.0        COVID19 testing scheduled on 8/10/21        ASSESSMENT and PLAN  Kristal Lester is a 60 year old female scheduled to undergo Revision right sacroiliac joint fusion with iliosacral screws and open posterior joint debridement and grafting, use of O-Arm/stealth navigation, allograft, local autograft, and bone morphogenic with Mac Urbina MD on 8/13/21 at [facility] for treatment of SI (sacroiliac) joint dysfunction.     Pt has had prior anesthetic.     History of anesthetic complications:  + motion sickness and severe PONV.    She has the following specific operative considerations:   # CANDIDO 1/8 = low risk  # VTE risk: 0.26%  # Risk of PONV score = 4.  If > 2, anti-emetic intervention recommended.  # Anesthesia considerations:  Refer to PAC assessment in anesthesia records    # Increased risk of postoperative nausea/vomiting: Recommend use of antiemetic agents in the perioperative period.        CARDIAC: METS >4,  Walks dog 6x/day, uses recumbant bike x45 min regularly.      # RCRI : No serious cardiac risks.  0.4% risk of major adverse cardiac event.     #  Stress test 2017: no ischemia     #  Holter monitor 2017: Sinus rhythm with 12,651 PVCs, 13% of all beats. No evidence for sustained arrythmias.       PULMONARY:     # Never smoked    # Denies asthma or inhaler use    GI:     # Mild GERD, untreated    # Gallstones    ENDO: BMI 27    # No DM    ORTHO:     # full ROM of neck    NEURO/PSYCH:     # BPPV    # Claustrophobia      Patient is optimized and is acceptable  candidate for the proposed procedure. No further diagnostic evaluation is needed.      Final plan per anesthesiologist on day of surgery.     Arrival time, NPO, shower and medication instructions provided by nursing staff today.  Preparing For Your Surgery handout given.    47 minutes spent on the date of the encounter doing chart review, history and exam, documentation and further activities as noted below:    Prep time: 12 minutes  Visit time: 25 minutes  Documentation time: 10 minutes      Joanne Jara PA-C  Preoperative Assessment Center  [facility]  Phone: [phone]  Fax: [phone]

## 2021-07-19 NOTE — ANESTHESIA PREPROCEDURE EVALUATION
Anesthesia Pre-Procedure Evaluation    Patient: Kristal Lester   MRN: [id number] : 1960        Preoperative Diagnosis: * No surgery found *   Procedure :      Past Medical History:   Diagnosis Date     Benign paroxysmal positional vertigo, left 7/10/2019     HLD (hyperlipidemia)      PVC's (premature ventricular contractions)      SI (sacroiliac) joint dysfunction       Past Surgical History:   Procedure Laterality Date     Bunion surgery  2017      SECTION  5 years ago, 35 years ago     ELBOW SURGERY Right      MAMMOPLASTY AUGMENTATION  23 years ago     OPTICAL TRACKING SYSTEM FUSION SACRAL ILIAC Right 2019    Procedure: Right Minimal Invasive Sacral Iliac Joint Fusion;  Surgeon: Mac Urbina MD;  Location: [facility] OR      Allergies   Allergen Reactions     Bees Shortness Of Breath     Cellulitis of the leg      Social History     Tobacco Use     Smoking status: Never Smoker     Smokeless tobacco: Never Used   Substance Use Topics     Alcohol use: Not Currently      Wt Readings from Last 1 Encounters:   21 76.2 kg (168 lb)        Anesthesia Evaluation   Pt has had prior anesthetic.     History of anesthetic complications  - motion sickness and PONV.  Severe.    ROS/MED HX  ENT/Pulmonary:  - neg pulmonary ROS  (-) tobacco use, asthma and sleep apnea   Neurologic: Comment: BPPV     (-) no seizures and no CVA   Cardiovascular: Comment: Holter monitor -  Sinus rhythm with 12,651 PVCs, 13% of all beats. No evidence for sustained arrythmias.      (+) -----Previous cardiac testing   Echo: Date: Results:    Stress Test: Date:  Results:  1. Normal stress echocardiogram with no inducible wall motion abnormalities at stress.   2. No diagnostic ischemic EKG changes during stress.   3. Occasional PVCs with rest extinguished with effort.  4. LV function normal. Visually estimated EF 60-65% at rest.  5. Normal regional wall motion at rest.   6. Normal heart rate and blood  pressure response to stress.   7. No pericardial effusion.   8. No anginal symptoms with exercise.  9. Target heart rate achieved.   10. Normal exercise capacity.   11. Oxygen saturation remains normal with stress    ECG Reviewed: Date: 2017 Results:  Sinus rhythm with frequent PVCs  Cath: Date: Results:      METS/Exercise Tolerance: 4 - Raking leaves, gardening Comment: Walks dog 6x/day, uses recumbant bike x45 min regularly.    Hematologic:    (-) history of blood clots and history of blood transfusion   Musculoskeletal: Comment: SI joint dysfunction    Cervical arthritis         GI/Hepatic: Comment: Mild GERD, untreated      (+) cholecystitis/cholelithiasis,  (-) liver disease   Renal/Genitourinary:  - neg Renal ROS     Endo:  - neg endo ROS  (-) Type II DM   Psychiatric/Substance Use: Comment: Claustrophobia        Infectious Disease:  - neg infectious disease ROS     Malignancy:  - neg malignancy ROS     Other:  - neg other ROS          Physical Exam    Airway  airway exam normal      Mallampati: II   TM distance: > 3 FB   Neck ROM: full   Mouth opening: > 3 cm    Respiratory Devices and Support         Dental  no notable dental history         Cardiovascular   cardiovascular exam normal          Pulmonary   pulmonary exam normal                OUTSIDE LABS:  CBC:   Lab Results   Component Value Date    HGB 13.9 08/22/2019     BMP:   Lab Results   Component Value Date     (H) 08/22/2019     COAGS: No results found for: PTT, INR, FIBR  POC:   Lab Results   Component Value Date    HCG Negative 08/22/2019     HEPATIC: No results found for: ALBUMIN, PROTTOTAL, ALT, AST, GGT, ALKPHOS, BILITOTAL, BILIDIRECT, MARRY  OTHER: No results found for: PH, LACT, A1C, JOCE, PHOS, MAG, LIPASE, AMYLASE, TSH, T4, T3, CRP, SED          PAC Discussion and Assessment    ASA Classification: 2  Case is suitable for: [facility]  Anesthetic techniques and relevant risks discussed: GA  Invasive monitoring and risk discussed:  No    Possibility and Risk of blood transfusion discussed: No            PAC Resident/NP Anesthesia Assessment: Kristal Lester is a 60 year old female scheduled to undergo Revision right sacroiliac joint fusion with iliosacral screws and open posterior joint debridement and grafting, use of O-Arm/stealth navigation, allograft, local autograft, and bone morphogenic with Mac Urbina MD on 8/13/21 at [facility] for treatment of SI (sacroiliac) joint dysfunction.     Pt has had prior anesthetic.     History of anesthetic complications:  + motion sickness and severe PONV.    She has the following specific operative considerations:   # CANDIDO 1/8 = low risk  # VTE risk: 0.26%  # Risk of PONV score = 4.  If > 2, anti-emetic intervention recommended.  # Anesthesia considerations:  Refer to PAC assessment in anesthesia records    # Increased risk of postoperative nausea/vomiting: Recommend use of antiemetic agents in the perioperative period.        CARDIAC: METS >4,  Walks dog 6x/day, uses recumbant bike x45 min regularly.      # RCRI : No serious cardiac risks.  0.4% risk of major adverse cardiac event.     #  Stress test 2017: no ischemia     #  Holter monitor 2017: Sinus rhythm with 12,651 PVCs, 13% of all beats. No evidence for sustained arrythmias.       PULMONARY:     # Never smoked    # Denies asthma or inhaler use    GI:     # Mild GERD, untreated    # Gallstones    ENDO: BMI 27    # No DM    ORTHO:     # full ROM of neck    NEURO/PSYCH:     # BPPV    # Claustrophobia      Patient is optimized and is acceptable candidate for the proposed procedure. No further diagnostic evaluation is needed.      Final plan per anesthesiologist on day of surgery.     Reviewed and Signed by PAC Mid-Level Provider/Resident  Mid-Level Provider/Resident: Joanne Jara PA-C  Date: 7/19/21  Time: 1141                               Joanne Jara PA-C

## 2021-07-19 NOTE — PATIENT INSTRUCTIONS
Preparing for Your Surgery      Name:  Kristal Lester   MRN:  [id number]   :  1960   Today's Date:  2021       Arriving for surgery:  Surgery date:  2021  Arrival time:  5:30 am    Restrictions due to COVID 19:       One visitor is allowed in the Pre Op area. When you go into surgery, one visitor is allowed to wait in the Surgery Waiting Room       (provided there is enough space to social distance).   After surgery- Two visitors are allowed at a time if you have a private room and one visitor is allowed for those in a semi-private room.   Every 4 days the visitor(s) can rotate. During the 4 day period, the visitor(s) must be consistent. No visitors under the age of 18 years old.   Visiting Hours: 8 am - 8:30 pm   No ill visitors.   All visitors must wear face mask.    [employer] parking is available for anyone with mobility limitations or disabilities.  ([place]  24 hours/ 7 days a week; [facility]  7 am- 3:30 pm, Mon- Fri)    Please come to:     [facility] [address]  [facility]     [address] [place]  [address]    -Parking is available in the Green Ramp    -Proceed to the 3rd floor, check in at the Adult Surgery Waiting Lounge. [phone]    If an escort is needed stop at the Information Desk in the lobby.         What can I eat or drink?  -  You may eat and drink normally for up to 8 hours before your surgery. (Until 21 at 11 pm)  -  You may have clear liquids until 2 hours before surgery. (Until 5:30 am arrival time)    Examples of clear liquids:  Water  Clear broth  Juices (apple, white grape, white cranberry  and cider) without pulp  Noncarbonated, powder based beverages  (lemonade and Xander-Aid)  Sodas (Sprite, 7-Up, ginger ale and seltzer)  Coffee or tea (without milk or cream)  Gatorade    -  No Alcohol for at least 24 hours before surgery     Which medicines can I take?    Hold Aspirin for 7  days before surgery.   Hold Multivitamins for 7 days before surgery.  Hold Supplements for 7 days before surgery.  Hold Ibuprofen (Advil, Motrin) for 1 day before surgery--unless otherwise directed by surgeon.  Hold Naproxen (Aleve) for 4 days before surgery.    -  DO NOT take these medications the day of surgery:  Calcium  Vitamin D      -  PLEASE TAKE these medications the day of surgery:  Cyclobenzaprine if needed   Escitalopram  Estradiol patch per your routine      How do I prepare myself?  - Please take 2 showers before surgery using Scrubcare or Hibiclens soap.    Use this soap only from the neck to your toes.     Leave the soap on your skin for one minute--then rinse thoroughly.      You may use your own shampoo and conditioner; no other hair products.   - Please remove all jewelry and body piercings.  - No lotions, deodorants or fragrance.  - No makeup or fingernail polish.   - Bring your ID and insurance card.    - All patients are required to have a Covid-19 test within 4 days of surgery/procedure.      -Patients will be contacted by the [facility] scheduling team within 1 week of surgery to make an appointment.      - Patients may call the Scheduling team at [phone] if they have not been scheduled within 4 days of  surgery.          Questions or Concerns:    - For any questions regarding the day of surgery or your hospital stay, please contact the Pre Admission Nursing Office at [phone].       - If you have health changes between today and your surgery please call your surgeon.       For questions after surgery please call your surgeons office.

## 2021-07-27 ENCOUNTER — TELEPHONE (OUTPATIENT)
Dept: ORTHOPEDICS | Facility: CLINIC | Age: 61
End: 2021-07-27

## 2021-07-27 DIAGNOSIS — Z11.59 ENCOUNTER FOR SCREENING FOR OTHER VIRAL DISEASES: ICD-10-CM

## 2021-07-27 NOTE — TELEPHONE ENCOUNTER
Phoned patient to offer to move her surgery up from 8/13/21 to 8/11/21 and patient accepted. No need to change her pre-op or COVID test appointments.

## 2021-08-10 ENCOUNTER — LAB (OUTPATIENT)
Dept: LAB | Facility: CLINIC | Age: 61
End: 2021-08-10
Payer: COMMERCIAL

## 2021-08-10 ENCOUNTER — ANESTHESIA EVENT (OUTPATIENT)
Dept: SURGERY | Facility: CLINIC | Age: 61
DRG: 460 | End: 2021-08-10
Payer: COMMERCIAL

## 2021-08-10 DIAGNOSIS — Z11.59 ENCOUNTER FOR SCREENING FOR OTHER VIRAL DISEASES: ICD-10-CM

## 2021-08-10 LAB — SARS-COV-2 RNA RESP QL NAA+PROBE: NEGATIVE

## 2021-08-10 PROCEDURE — U0003 INFECTIOUS AGENT DETECTION BY NUCLEIC ACID (DNA OR RNA); SEVERE ACUTE RESPIRATORY SYNDROME CORONAVIRUS 2 (SARS-COV-2) (CORONAVIRUS DISEASE [COVID-19]), AMPLIFIED PROBE TECHNIQUE, MAKING USE OF HIGH THROUGHPUT TECHNOLOGIES AS DESCRIBED BY CMS-2020-01-R: HCPCS

## 2021-08-10 PROCEDURE — U0005 INFEC AGEN DETEC AMPLI PROBE: HCPCS

## 2021-08-11 ENCOUNTER — HOSPITAL ENCOUNTER (INPATIENT)
Facility: CLINIC | Age: 61
LOS: 1 days | Discharge: HOME | DRG: 460 | End: 2021-08-12
Attending: ORTHOPAEDIC SURGERY | Admitting: ORTHOPAEDIC SURGERY
Payer: COMMERCIAL

## 2021-08-11 ENCOUNTER — APPOINTMENT (OUTPATIENT)
Dept: GENERAL RADIOLOGY | Facility: CLINIC | Age: 61
DRG: 460 | End: 2021-08-11
Attending: ORTHOPAEDIC SURGERY
Payer: COMMERCIAL

## 2021-08-11 ENCOUNTER — ANESTHESIA (OUTPATIENT)
Dept: SURGERY | Facility: CLINIC | Age: 61
DRG: 460 | End: 2021-08-11
Payer: COMMERCIAL

## 2021-08-11 ENCOUNTER — SURGERY (OUTPATIENT)
Age: 61
End: 2021-08-11
Payer: COMMERCIAL

## 2021-08-11 DIAGNOSIS — M53.3 SI (SACROILIAC) JOINT DYSFUNCTION: ICD-10-CM

## 2021-08-11 DIAGNOSIS — G89.18 ACUTE POST-OPERATIVE PAIN: Primary | ICD-10-CM

## 2021-08-11 LAB
ATRIAL RATE - MUSE: 66 BPM
DIASTOLIC BLOOD PRESSURE - MUSE: NORMAL MMHG
GLUCOSE BLDC GLUCOMTR-MCNC: 96 MG/DL (ref 70–99)
INTERPRETATION ECG - MUSE: NORMAL
P AXIS - MUSE: 58 DEGREES
PR INTERVAL - MUSE: 182 MS
QRS DURATION - MUSE: 86 MS
QT - MUSE: 398 MS
QTC - MUSE: 417 MS
R AXIS - MUSE: 86 DEGREES
SYSTOLIC BLOOD PRESSURE - MUSE: NORMAL MMHG
T AXIS - MUSE: 66 DEGREES
VENTRICULAR RATE- MUSE: 66 BPM

## 2021-08-11 PROCEDURE — 710N000010 HC RECOVERY PHASE 1, LEVEL 2, PER MIN: Performed by: ORTHOPAEDIC SURGERY

## 2021-08-11 PROCEDURE — 272N000001 HC OR GENERAL SUPPLY STERILE: Performed by: ORTHOPAEDIC SURGERY

## 2021-08-11 PROCEDURE — 99207 PR CONSULT E&M CHANGED TO SUBSEQUENT LEVEL: CPT | Performed by: INTERNAL MEDICINE

## 2021-08-11 PROCEDURE — C1762 CONN TISS, HUMAN(INC FASCIA): HCPCS | Performed by: ORTHOPAEDIC SURGERY

## 2021-08-11 PROCEDURE — 27280 ARTHR SI JT OPN B1GRF INSTRM: CPT | Mod: RT | Performed by: ORTHOPAEDIC SURGERY

## 2021-08-11 PROCEDURE — 250N000009 HC RX 250: Performed by: ORTHOPAEDIC SURGERY

## 2021-08-11 PROCEDURE — 0SP704Z REMOVAL OF INTERNAL FIXATION DEVICE FROM RIGHT SACROILIAC JOINT, OPEN APPROACH: ICD-10-PCS | Performed by: ORTHOPAEDIC SURGERY

## 2021-08-11 PROCEDURE — L8699 PROSTHETIC IMPLANT NOS: HCPCS | Performed by: ORTHOPAEDIC SURGERY

## 2021-08-11 PROCEDURE — 8E0YXBF COMPUTER ASSISTED PROCEDURE OF LOWER EXTREMITY, WITH FLUOROSCOPY: ICD-10-PCS | Performed by: ORTHOPAEDIC SURGERY

## 2021-08-11 PROCEDURE — 258N000003 HC RX IP 258 OP 636: Performed by: NURSE ANESTHETIST, CERTIFIED REGISTERED

## 2021-08-11 PROCEDURE — C1713 ANCHOR/SCREW BN/BN,TIS/BN: HCPCS | Performed by: ORTHOPAEDIC SURGERY

## 2021-08-11 PROCEDURE — 250N000011 HC RX IP 250 OP 636: Performed by: NURSE ANESTHETIST, CERTIFIED REGISTERED

## 2021-08-11 PROCEDURE — 0QU10KZ SUPPLEMENT SACRUM WITH NONAUTOLOGOUS TISSUE SUBSTITUTE, OPEN APPROACH: ICD-10-PCS | Performed by: ORTHOPAEDIC SURGERY

## 2021-08-11 PROCEDURE — 250N000013 HC RX MED GY IP 250 OP 250 PS 637: Performed by: PHYSICIAN ASSISTANT

## 2021-08-11 PROCEDURE — 999N000180 XR SURGERY CARM FLUORO LESS THAN 5 MIN: Mod: TC

## 2021-08-11 PROCEDURE — 370N000017 HC ANESTHESIA TECHNICAL FEE, PER MIN: Performed by: ORTHOPAEDIC SURGERY

## 2021-08-11 PROCEDURE — 999N000141 HC STATISTIC PRE-PROCEDURE NURSING ASSESSMENT: Performed by: ORTHOPAEDIC SURGERY

## 2021-08-11 PROCEDURE — 360N000086 HC SURGERY LEVEL 6 W/ FLUORO, PER MIN: Performed by: ORTHOPAEDIC SURGERY

## 2021-08-11 PROCEDURE — 258N000003 HC RX IP 258 OP 636: Performed by: ANESTHESIOLOGY

## 2021-08-11 PROCEDURE — 0SG704Z FUSION OF RIGHT SACROILIAC JOINT WITH INTERNAL FIXATION DEVICE, OPEN APPROACH: ICD-10-PCS | Performed by: ORTHOPAEDIC SURGERY

## 2021-08-11 PROCEDURE — 250N000013 HC RX MED GY IP 250 OP 250 PS 637: Performed by: INTERNAL MEDICINE

## 2021-08-11 PROCEDURE — 61783 SCAN PROC SPINAL: CPT | Performed by: ORTHOPAEDIC SURGERY

## 2021-08-11 PROCEDURE — 250N000011 HC RX IP 250 OP 636: Performed by: PHYSICIAN ASSISTANT

## 2021-08-11 PROCEDURE — 250N000025 HC SEVOFLURANE, PER MIN: Performed by: ORTHOPAEDIC SURGERY

## 2021-08-11 PROCEDURE — 120N000002 HC R&B MED SURG/OB UMMC

## 2021-08-11 PROCEDURE — 250N000009 HC RX 250: Performed by: NURSE ANESTHETIST, CERTIFIED REGISTERED

## 2021-08-11 PROCEDURE — 250N000013 HC RX MED GY IP 250 OP 250 PS 637: Performed by: ANESTHESIOLOGY

## 2021-08-11 PROCEDURE — 999N000054 HC STATISTIC EKG NON-CHARGEABLE

## 2021-08-11 PROCEDURE — 250N000011 HC RX IP 250 OP 636: Performed by: ANESTHESIOLOGY

## 2021-08-11 PROCEDURE — 99232 SBSQ HOSP IP/OBS MODERATE 35: CPT | Performed by: INTERNAL MEDICINE

## 2021-08-11 PROCEDURE — C1769 GUIDE WIRE: HCPCS | Performed by: ORTHOPAEDIC SURGERY

## 2021-08-11 PROCEDURE — 250N000012 HC RX MED GY IP 250 OP 636 PS 637: Performed by: ANESTHESIOLOGY

## 2021-08-11 DEVICE — GRAFT BONE INFUSE BMP LG 7510600: Type: IMPLANTABLE DEVICE | Site: SACRUM | Status: FUNCTIONAL

## 2021-08-11 DEVICE — GRAFT BONE CRUSH CANC 30ML 400080: Type: IMPLANTABLE DEVICE | Site: SACRUM | Status: FUNCTIONAL

## 2021-08-11 DEVICE — IMPLANTABLE DEVICE: Type: IMPLANTABLE DEVICE | Site: SACRUM | Status: FUNCTIONAL

## 2021-08-11 DEVICE — IMP WASHER SYN 13.0MM TI 419.99: Type: IMPLANTABLE DEVICE | Site: SACRUM | Status: FUNCTIONAL

## 2021-08-11 RX ORDER — ACETAMINOPHEN 325 MG/1
975 TABLET ORAL ONCE
Status: COMPLETED | OUTPATIENT
Start: 2021-08-11 | End: 2021-08-11

## 2021-08-11 RX ORDER — OXYCODONE HYDROCHLORIDE 5 MG/1
5 TABLET ORAL EVERY 4 HOURS PRN
Status: DISCONTINUED | OUTPATIENT
Start: 2021-08-11 | End: 2021-08-11 | Stop reason: HOSPADM

## 2021-08-11 RX ORDER — METHOCARBAMOL 750 MG/1
750 TABLET, FILM COATED ORAL EVERY 6 HOURS PRN
Status: DISCONTINUED | OUTPATIENT
Start: 2021-08-11 | End: 2021-08-12 | Stop reason: HOSPADM

## 2021-08-11 RX ORDER — GABAPENTIN 100 MG/1
300 CAPSULE ORAL
Status: DISCONTINUED | OUTPATIENT
Start: 2021-08-11 | End: 2021-08-11 | Stop reason: HOSPADM

## 2021-08-11 RX ORDER — ONDANSETRON 2 MG/ML
4 INJECTION INTRAMUSCULAR; INTRAVENOUS EVERY 30 MIN PRN
Status: DISCONTINUED | OUTPATIENT
Start: 2021-08-11 | End: 2021-08-11 | Stop reason: HOSPADM

## 2021-08-11 RX ORDER — ONDANSETRON 4 MG/1
4 TABLET, ORALLY DISINTEGRATING ORAL EVERY 30 MIN PRN
Status: DISCONTINUED | OUTPATIENT
Start: 2021-08-11 | End: 2021-08-11 | Stop reason: HOSPADM

## 2021-08-11 RX ORDER — PROCHLORPERAZINE MALEATE 10 MG
10 TABLET ORAL EVERY 6 HOURS PRN
Status: DISCONTINUED | OUTPATIENT
Start: 2021-08-11 | End: 2021-08-12 | Stop reason: HOSPADM

## 2021-08-11 RX ORDER — SODIUM CHLORIDE, SODIUM LACTATE, POTASSIUM CHLORIDE, CALCIUM CHLORIDE 600; 310; 30; 20 MG/100ML; MG/100ML; MG/100ML; MG/100ML
INJECTION, SOLUTION INTRAVENOUS CONTINUOUS
Status: DISCONTINUED | OUTPATIENT
Start: 2021-08-11 | End: 2021-08-11 | Stop reason: HOSPADM

## 2021-08-11 RX ORDER — FENTANYL CITRATE 50 UG/ML
INJECTION, SOLUTION INTRAMUSCULAR; INTRAVENOUS PRN
Status: DISCONTINUED | OUTPATIENT
Start: 2021-08-11 | End: 2021-08-11

## 2021-08-11 RX ORDER — GABAPENTIN 100 MG/1
300 CAPSULE ORAL
Status: COMPLETED | OUTPATIENT
Start: 2021-08-11 | End: 2021-08-11

## 2021-08-11 RX ORDER — DEXAMETHASONE SODIUM PHOSPHATE 4 MG/ML
INJECTION, SOLUTION INTRA-ARTICULAR; INTRALESIONAL; INTRAMUSCULAR; INTRAVENOUS; SOFT TISSUE PRN
Status: DISCONTINUED | OUTPATIENT
Start: 2021-08-11 | End: 2021-08-11

## 2021-08-11 RX ORDER — EPHEDRINE SULFATE 50 MG/ML
INJECTION, SOLUTION INTRAMUSCULAR; INTRAVENOUS; SUBCUTANEOUS PRN
Status: DISCONTINUED | OUTPATIENT
Start: 2021-08-11 | End: 2021-08-11

## 2021-08-11 RX ORDER — FAMOTIDINE 20 MG/1
20 TABLET, FILM COATED ORAL 2 TIMES DAILY
Status: DISCONTINUED | OUTPATIENT
Start: 2021-08-11 | End: 2021-08-12 | Stop reason: HOSPADM

## 2021-08-11 RX ORDER — NALOXONE HYDROCHLORIDE 0.4 MG/ML
0.4 INJECTION, SOLUTION INTRAMUSCULAR; INTRAVENOUS; SUBCUTANEOUS
Status: DISCONTINUED | OUTPATIENT
Start: 2021-08-11 | End: 2021-08-12 | Stop reason: HOSPADM

## 2021-08-11 RX ORDER — OXYCODONE HYDROCHLORIDE 5 MG/1
5 TABLET ORAL EVERY 4 HOURS PRN
Status: DISCONTINUED | OUTPATIENT
Start: 2021-08-11 | End: 2021-08-11

## 2021-08-11 RX ORDER — ESCITALOPRAM OXALATE 10 MG/1
10 TABLET ORAL EVERY MORNING
Status: DISCONTINUED | OUTPATIENT
Start: 2021-08-12 | End: 2021-08-12 | Stop reason: HOSPADM

## 2021-08-11 RX ORDER — LIDOCAINE 40 MG/G
CREAM TOPICAL
Status: DISCONTINUED | OUTPATIENT
Start: 2021-08-11 | End: 2021-08-12 | Stop reason: HOSPADM

## 2021-08-11 RX ORDER — NALOXONE HYDROCHLORIDE 0.4 MG/ML
0.2 INJECTION, SOLUTION INTRAMUSCULAR; INTRAVENOUS; SUBCUTANEOUS
Status: DISCONTINUED | OUTPATIENT
Start: 2021-08-11 | End: 2021-08-11 | Stop reason: HOSPADM

## 2021-08-11 RX ORDER — PROGESTERONE 100 MG/1
100 CAPSULE ORAL AT BEDTIME
Status: DISCONTINUED | OUTPATIENT
Start: 2021-08-11 | End: 2021-08-12 | Stop reason: HOSPADM

## 2021-08-11 RX ORDER — BUPIVACAINE HYDROCHLORIDE AND EPINEPHRINE 2.5; 5 MG/ML; UG/ML
INJECTION, SOLUTION INFILTRATION; PERINEURAL PRN
Status: DISCONTINUED | OUTPATIENT
Start: 2021-08-11 | End: 2021-08-11 | Stop reason: HOSPADM

## 2021-08-11 RX ORDER — ONDANSETRON 4 MG/1
4 TABLET, ORALLY DISINTEGRATING ORAL EVERY 6 HOURS PRN
Status: DISCONTINUED | OUTPATIENT
Start: 2021-08-11 | End: 2021-08-12 | Stop reason: HOSPADM

## 2021-08-11 RX ORDER — VITAMIN B COMPLEX
50 TABLET ORAL EVERY MORNING
Status: DISCONTINUED | OUTPATIENT
Start: 2021-08-12 | End: 2021-08-12 | Stop reason: HOSPADM

## 2021-08-11 RX ORDER — ROSUVASTATIN CALCIUM 5 MG/1
10 TABLET, COATED ORAL AT BEDTIME
Status: DISCONTINUED | OUTPATIENT
Start: 2021-08-11 | End: 2021-08-12 | Stop reason: HOSPADM

## 2021-08-11 RX ORDER — NALOXONE HYDROCHLORIDE 0.4 MG/ML
0.4 INJECTION, SOLUTION INTRAMUSCULAR; INTRAVENOUS; SUBCUTANEOUS
Status: DISCONTINUED | OUTPATIENT
Start: 2021-08-11 | End: 2021-08-11 | Stop reason: HOSPADM

## 2021-08-11 RX ORDER — CEFAZOLIN SODIUM 2 G/100ML
2 INJECTION, SOLUTION INTRAVENOUS SEE ADMIN INSTRUCTIONS
Status: DISCONTINUED | OUTPATIENT
Start: 2021-08-11 | End: 2021-08-11 | Stop reason: HOSPADM

## 2021-08-11 RX ORDER — HYDROXYZINE HYDROCHLORIDE 25 MG/1
25 TABLET, FILM COATED ORAL EVERY 6 HOURS PRN
Status: DISCONTINUED | OUTPATIENT
Start: 2021-08-11 | End: 2021-08-12 | Stop reason: HOSPADM

## 2021-08-11 RX ORDER — OXYCODONE HYDROCHLORIDE 5 MG/1
10 TABLET ORAL EVERY 4 HOURS PRN
Status: DISCONTINUED | OUTPATIENT
Start: 2021-08-11 | End: 2021-08-11

## 2021-08-11 RX ORDER — BISACODYL 10 MG
10 SUPPOSITORY, RECTAL RECTAL DAILY PRN
Status: DISCONTINUED | OUTPATIENT
Start: 2021-08-11 | End: 2021-08-12 | Stop reason: HOSPADM

## 2021-08-11 RX ORDER — ONDANSETRON 2 MG/ML
4 INJECTION INTRAMUSCULAR; INTRAVENOUS EVERY 6 HOURS PRN
Status: DISCONTINUED | OUTPATIENT
Start: 2021-08-11 | End: 2021-08-12 | Stop reason: HOSPADM

## 2021-08-11 RX ORDER — FENTANYL CITRATE 50 UG/ML
25 INJECTION, SOLUTION INTRAMUSCULAR; INTRAVENOUS EVERY 5 MIN PRN
Status: DISCONTINUED | OUTPATIENT
Start: 2021-08-11 | End: 2021-08-11 | Stop reason: HOSPADM

## 2021-08-11 RX ORDER — AMOXICILLIN 250 MG
1 CAPSULE ORAL 2 TIMES DAILY
Status: DISCONTINUED | OUTPATIENT
Start: 2021-08-11 | End: 2021-08-12 | Stop reason: HOSPADM

## 2021-08-11 RX ORDER — PROPOFOL 10 MG/ML
INJECTION, EMULSION INTRAVENOUS PRN
Status: DISCONTINUED | OUTPATIENT
Start: 2021-08-11 | End: 2021-08-11

## 2021-08-11 RX ORDER — LIDOCAINE HYDROCHLORIDE 20 MG/ML
INJECTION, SOLUTION INFILTRATION; PERINEURAL PRN
Status: DISCONTINUED | OUTPATIENT
Start: 2021-08-11 | End: 2021-08-11

## 2021-08-11 RX ORDER — ACETAMINOPHEN 325 MG/1
975 TABLET ORAL EVERY 8 HOURS
Status: DISCONTINUED | OUTPATIENT
Start: 2021-08-11 | End: 2021-08-12 | Stop reason: HOSPADM

## 2021-08-11 RX ORDER — ACETAMINOPHEN 325 MG/1
650 TABLET ORAL EVERY 4 HOURS PRN
Status: DISCONTINUED | OUTPATIENT
Start: 2021-08-14 | End: 2021-08-12 | Stop reason: HOSPADM

## 2021-08-11 RX ORDER — OXYCODONE HYDROCHLORIDE 5 MG/1
5 TABLET ORAL EVERY 4 HOURS PRN
Status: DISCONTINUED | OUTPATIENT
Start: 2021-08-11 | End: 2021-08-12 | Stop reason: HOSPADM

## 2021-08-11 RX ORDER — HYDROMORPHONE HCL IN WATER/PF 6 MG/30 ML
0.4 PATIENT CONTROLLED ANALGESIA SYRINGE INTRAVENOUS
Status: DISCONTINUED | OUTPATIENT
Start: 2021-08-11 | End: 2021-08-12 | Stop reason: HOSPADM

## 2021-08-11 RX ORDER — MEPERIDINE HYDROCHLORIDE 25 MG/ML
12.5 INJECTION INTRAMUSCULAR; INTRAVENOUS; SUBCUTANEOUS
Status: DISCONTINUED | OUTPATIENT
Start: 2021-08-11 | End: 2021-08-11 | Stop reason: HOSPADM

## 2021-08-11 RX ORDER — HYDROMORPHONE HCL IN WATER/PF 6 MG/30 ML
0.2 PATIENT CONTROLLED ANALGESIA SYRINGE INTRAVENOUS
Status: DISCONTINUED | OUTPATIENT
Start: 2021-08-11 | End: 2021-08-12 | Stop reason: HOSPADM

## 2021-08-11 RX ORDER — NALOXONE HYDROCHLORIDE 0.4 MG/ML
0.2 INJECTION, SOLUTION INTRAMUSCULAR; INTRAVENOUS; SUBCUTANEOUS
Status: DISCONTINUED | OUTPATIENT
Start: 2021-08-11 | End: 2021-08-12 | Stop reason: HOSPADM

## 2021-08-11 RX ORDER — CEFAZOLIN SODIUM 1 G/3ML
1 INJECTION, POWDER, FOR SOLUTION INTRAMUSCULAR; INTRAVENOUS EVERY 8 HOURS
Status: ACTIVE | OUTPATIENT
Start: 2021-08-11 | End: 2021-08-12

## 2021-08-11 RX ORDER — LORAZEPAM 0.5 MG/1
0.5 TABLET ORAL AT BEDTIME
Status: DISCONTINUED | OUTPATIENT
Start: 2021-08-11 | End: 2021-08-12 | Stop reason: HOSPADM

## 2021-08-11 RX ORDER — APREPITANT 40 MG/1
40 CAPSULE ORAL
Status: COMPLETED | OUTPATIENT
Start: 2021-08-11 | End: 2021-08-11

## 2021-08-11 RX ORDER — OXYCODONE AND ACETAMINOPHEN 5; 325 MG/1; MG/1
1 TABLET ORAL EVERY 6 HOURS PRN
Status: ON HOLD | COMMUNITY
End: 2021-08-12

## 2021-08-11 RX ORDER — MAGNESIUM HYDROXIDE 1200 MG/15ML
LIQUID ORAL PRN
Status: DISCONTINUED | OUTPATIENT
Start: 2021-08-11 | End: 2021-08-11 | Stop reason: HOSPADM

## 2021-08-11 RX ORDER — POLYETHYLENE GLYCOL 3350 17 G/17G
17 POWDER, FOR SOLUTION ORAL DAILY
Status: DISCONTINUED | OUTPATIENT
Start: 2021-08-12 | End: 2021-08-12 | Stop reason: HOSPADM

## 2021-08-11 RX ORDER — CEFAZOLIN SODIUM 2 G/100ML
2 INJECTION, SOLUTION INTRAVENOUS
Status: COMPLETED | OUTPATIENT
Start: 2021-08-11 | End: 2021-08-11

## 2021-08-11 RX ORDER — LIDOCAINE 40 MG/G
CREAM TOPICAL
Status: DISCONTINUED | OUTPATIENT
Start: 2021-08-11 | End: 2021-08-11 | Stop reason: HOSPADM

## 2021-08-11 RX ORDER — HYDROMORPHONE HYDROCHLORIDE 1 MG/ML
0.2 INJECTION, SOLUTION INTRAMUSCULAR; INTRAVENOUS; SUBCUTANEOUS EVERY 5 MIN PRN
Status: DISCONTINUED | OUTPATIENT
Start: 2021-08-11 | End: 2021-08-11 | Stop reason: HOSPADM

## 2021-08-11 RX ORDER — ONDANSETRON 2 MG/ML
INJECTION INTRAMUSCULAR; INTRAVENOUS PRN
Status: DISCONTINUED | OUTPATIENT
Start: 2021-08-11 | End: 2021-08-11

## 2021-08-11 RX ADMIN — SODIUM CHLORIDE 300 ML: 900 IRRIGANT IRRIGATION at 10:01

## 2021-08-11 RX ADMIN — OXYCODONE HYDROCHLORIDE 5 MG: 5 TABLET ORAL at 20:07

## 2021-08-11 RX ADMIN — PHENYLEPHRINE HYDROCHLORIDE 50 MCG: 10 INJECTION INTRAVENOUS at 08:44

## 2021-08-11 RX ADMIN — LIDOCAINE HYDROCHLORIDE 80 MG: 20 INJECTION, SOLUTION INFILTRATION; PERINEURAL at 07:39

## 2021-08-11 RX ADMIN — GABAPENTIN 300 MG: 300 CAPSULE ORAL at 06:39

## 2021-08-11 RX ADMIN — Medication 2 G: at 07:52

## 2021-08-11 RX ADMIN — SODIUM CHLORIDE, POTASSIUM CHLORIDE, SODIUM LACTATE AND CALCIUM CHLORIDE: 600; 310; 30; 20 INJECTION, SOLUTION INTRAVENOUS at 09:22

## 2021-08-11 RX ADMIN — Medication 5 MG: at 08:44

## 2021-08-11 RX ADMIN — ROCURONIUM BROMIDE 20 MG: 10 INJECTION INTRAVENOUS at 08:50

## 2021-08-11 RX ADMIN — FENTANYL CITRATE 25 MCG: 50 INJECTION, SOLUTION INTRAMUSCULAR; INTRAVENOUS at 10:55

## 2021-08-11 RX ADMIN — ACETAMINOPHEN 975 MG: 325 TABLET, FILM COATED ORAL at 06:39

## 2021-08-11 RX ADMIN — DEXAMETHASONE SODIUM PHOSPHATE 8 MG: 4 INJECTION, SOLUTION INTRAMUSCULAR; INTRAVENOUS at 08:00

## 2021-08-11 RX ADMIN — PROPOFOL 150 MCG/KG/MIN: 10 INJECTION, EMULSION INTRAVENOUS at 08:03

## 2021-08-11 RX ADMIN — Medication 5 MG: at 08:40

## 2021-08-11 RX ADMIN — SUGAMMADEX 150 MG: 100 INJECTION, SOLUTION INTRAVENOUS at 10:16

## 2021-08-11 RX ADMIN — ROCURONIUM BROMIDE 50 MG: 10 INJECTION INTRAVENOUS at 07:40

## 2021-08-11 RX ADMIN — PROGESTERONE 100 MG: 100 CAPSULE ORAL at 21:23

## 2021-08-11 RX ADMIN — BUPIVACAINE HYDROCHLORIDE AND EPINEPHRINE BITARTRATE 50 ML: 2.5; .005 INJECTION, SOLUTION INFILTRATION; PERINEURAL at 10:01

## 2021-08-11 RX ADMIN — DOCUSATE SODIUM AND SENNOSIDES 1 TABLET: 8.6; 5 TABLET ORAL at 20:07

## 2021-08-11 RX ADMIN — ACETAMINOPHEN 975 MG: 325 TABLET, FILM COATED ORAL at 16:14

## 2021-08-11 RX ADMIN — PROPOFOL 160 MG: 10 INJECTION, EMULSION INTRAVENOUS at 07:39

## 2021-08-11 RX ADMIN — SODIUM CHLORIDE, POTASSIUM CHLORIDE, SODIUM LACTATE AND CALCIUM CHLORIDE: 600; 310; 30; 20 INJECTION, SOLUTION INTRAVENOUS at 07:31

## 2021-08-11 RX ADMIN — MIDAZOLAM 2 MG: 1 INJECTION INTRAMUSCULAR; INTRAVENOUS at 07:31

## 2021-08-11 RX ADMIN — FAMOTIDINE 20 MG: 20 TABLET ORAL at 21:19

## 2021-08-11 RX ADMIN — OXYCODONE HYDROCHLORIDE 5 MG: 5 TABLET ORAL at 11:47

## 2021-08-11 RX ADMIN — CEFAZOLIN 1 G: 1 INJECTION, POWDER, FOR SOLUTION INTRAMUSCULAR; INTRAVENOUS at 16:14

## 2021-08-11 RX ADMIN — FENTANYL CITRATE 25 MCG: 50 INJECTION, SOLUTION INTRAMUSCULAR; INTRAVENOUS at 10:50

## 2021-08-11 RX ADMIN — OXYCODONE HYDROCHLORIDE 5 MG: 5 TABLET ORAL at 16:14

## 2021-08-11 RX ADMIN — ONDANSETRON 4 MG: 2 INJECTION INTRAMUSCULAR; INTRAVENOUS at 10:02

## 2021-08-11 RX ADMIN — FENTANYL CITRATE 25 MCG: 50 INJECTION, SOLUTION INTRAMUSCULAR; INTRAVENOUS at 11:10

## 2021-08-11 RX ADMIN — PHENYLEPHRINE HYDROCHLORIDE 50 MCG: 10 INJECTION INTRAVENOUS at 08:40

## 2021-08-11 RX ADMIN — APREPITANT 40 MG: 40 CAPSULE ORAL at 07:26

## 2021-08-11 RX ADMIN — HYDROMORPHONE HYDROCHLORIDE 0.2 MG: 1 INJECTION, SOLUTION INTRAMUSCULAR; INTRAVENOUS; SUBCUTANEOUS at 11:40

## 2021-08-11 RX ADMIN — FENTANYL CITRATE 25 MCG: 50 INJECTION, SOLUTION INTRAMUSCULAR; INTRAVENOUS at 11:20

## 2021-08-11 RX ADMIN — LORAZEPAM 0.5 MG: 0.5 TABLET ORAL at 21:20

## 2021-08-11 RX ADMIN — ROSUVASTATIN CALCIUM 10 MG: 5 TABLET, FILM COATED ORAL at 20:08

## 2021-08-11 RX ADMIN — FENTANYL CITRATE 50 MCG: 50 INJECTION, SOLUTION INTRAMUSCULAR; INTRAVENOUS at 07:39

## 2021-08-11 ASSESSMENT — MIFFLIN-ST. JEOR: SCORE: 1341.88

## 2021-08-11 ASSESSMENT — ENCOUNTER SYMPTOMS
DYSRHYTHMIAS: 1
SEIZURES: 0

## 2021-08-11 ASSESSMENT — LIFESTYLE VARIABLES: TOBACCO_USE: 0

## 2021-08-11 NOTE — OR NURSING
PACU to Inpatient Nursing Handoff    Patient Kristal Lester is a 61 year old female who speaks English.   Procedure Procedure(s):  Revision right sacroiliac joint fusion with iliosacral screws and open posterior joint debridement and grafting, use of O-Arm/stealth navigation, allograft, local autograft, and bone morphogenic   Surgeon(s) Primary: Mac Urbina MD  Assisting: Claudia Escobar PA-C     Allergies   Allergen Reactions     Bees Shortness Of Breath     Cellulitis of the leg       Isolation  [unfilled]     Past Medical History   has a past medical history of Benign paroxysmal positional vertigo, left (7/10/2019), HLD (hyperlipidemia), PVC's (premature ventricular contractions), and SI (sacroiliac) joint dysfunction.    Anesthesia General   Dermatome Level     Preop Meds acetaminophen (Tylenol) - time given: 0639  gabapentin (Neurontin) - time given: 0639  Emend - time given: 0726   Nerve block Not applicable   Intraop Meds dexamethasone (Decadron)  fentanyl (Sublimaze): 100 mcg total  ondansetron (Zofran): last given at 1002   Local Meds Yes   Antibiotics cefazolin (Ancef) - last given at 0752     Pain Patient Currently in Pain: yes   PACU meds  fentanyl (Sublimaze): 100 mcg (total dose) last given at 1120   hydromorphone (Dilaudid): 0.2 mg (total dose) last given at 1140   oxycodone (Roxicodone): 5 mg (total dose) last given at 1147    PCA / epidural No   Capnography Respiratory Monitoring (EtCO2): 42 mmHg  Integrated Pulmonary Index (IPI): 10   Telemetry ECG Rhythm: Normal sinus rhythm   Inpatient Telemetry Monitor Ordered? No        Labs Glucose Lab Results   Component Value Date    GLC 96 08/11/2021    GLC 90 07/19/2021     08/22/2019       Hgb Lab Results   Component Value Date    HGB 13.6 07/19/2021    HGB 13.9 08/22/2019       INR No results found for: INR   PACU Imaging Not applicable     Wound/Incision Incision/Surgical Site 08/22/19 Right;Lateral Hip (Active)   Number of  days: 720       Incision/Surgical Site 08/22/19 Left;Lower;Mid Back (Active)   Number of days: 720       Incision/Surgical Site 08/11/21 Right Hip (Active)   Incision Assessment UTV 08/11/21 1037   Staci-Incision Assessment UTV 08/11/21 1037   Closure Approximated;Liquid bandage 08/11/21 1001   Dressing Intervention Clean, dry, intact 08/11/21 1037   Number of days: 0       Incision/Surgical Site 08/11/21 Bilateral Back (Active)   Incision Assessment UTV 08/11/21 1037   Staci-Incision Assessment UTV 08/11/21 1037   Closure Approximated;Liquid bandage 08/11/21 1001   Dressing Intervention Clean, dry, intact 08/11/21 1037   Number of days: 0      CMS        Equipment ice pack   Other LDA       IV Access Peripheral IV 08/11/21 Left Hand (Active)   Site Assessment WDL 08/11/21 1037   Line Status Infusing 08/11/21 1037   Phlebitis Scale 0-->no symptoms 08/11/21 1037   Infiltration Scale 0 08/11/21 1037   Number of days: 0      Blood Products Not applicable EBL 50 mL   Intake/Output Date 08/11/21 0700 - 08/12/21 0659   Shift 1663-4992 1646-0525 2530-1983 24 Hour Total   INTAKE   I.V. 1300   1300   Shift Total(mL/kg) 1300(16.75)   1300(16.75)   OUTPUT   Urine 350   350   Blood 50   50   Shift Total(mL/kg) 400(5.15)   400(5.15)   Weight (kg) 77.6 77.6 77.6 77.6      Drains / Mir Urethral Catheter Double-lumen;Non-latex;Straight-tip 16 fr (Active)   Collection Container Standard 08/11/21 1037   Securement Method Securing device (Describe) 08/11/21 1037   Rationale for Continued Use Anesthesia 08/11/21 1037   Number of days: 0      Time of void PreOp Void Prior to Procedure: 0600 (08/11/21 0625)    PostOp      Diapered? No   Bladder Scan     PO    tolerating sips     Vitals    B/P: (!) 140/80  T: 97.9  F (36.6  C)    Temp src: Axillary  P:  Pulse: 84 (08/11/21 1100)          R: 13  O2:  SpO2: 97 %    O2 Device: Nasal cannula (08/11/21 1037)    Oxygen Delivery: 2 LPM (08/11/21 1037)         Family/support present Friend    Patient belongings     Patient transported on bed   DC meds/scripts (obs/outpt) Not applicable   Inpatient Pain Meds Released? Yes       Special needs/considerations None   Tasks needing completion None       Estelle Lira, RN  ASCOM [id number]

## 2021-08-11 NOTE — PLAN OF CARE
"      VS:   /58 (BP Location: Left arm)   Pulse 58   Temp (!) 96.3  F (35.7  C) (Axillary)   Resp 14   Ht 1.651 m (5' 5\")   Wt 77.6 kg (171 lb 1.2 oz)   SpO2 95%   BMI 28.47 kg/m   on RA     Output:   Pt requested to remove lauren.   Lauren removed,   voided urine x3   last PVR 5ml   Activity:   Up with walker and gait belt, assist of one.  50% weight bearing on Rt leg, pt has crutches at home.   Skin: Intact except for incisions   Pain:   Oxycodone 5mg q 4hrs, scheduled Tylenol  q 8hrs  Ice applied to back   Neuro/CMS:   No weakness. No N/T   Dressing(s):   Dressing x3 ---CDI   Diet:   Tolerates regular diet. No N/V or reflux   LDA:   No drain.   Equipment:   Walker. Reese PCD's   Plan:   Possible discharge home tomorrow    Additional Info:   Encouraged coughing and deep breathing with IS q 1-2 hr .       "

## 2021-08-11 NOTE — ANESTHESIA PREPROCEDURE EVALUATION
Anesthesia Pre-Procedure Evaluation    Patient: Kristal Lester   MRN: [id number] : 1960        Preoperative Diagnosis: SI (sacroiliac) joint dysfunction [M53.3]   Procedure : Procedure(s):  Revision right sacroiliac joint fusion with iliosacral screws and open posterior joint debridement and grafting, use of O-Arm/stealth navigation, allograft, local autograft, and bone morphogenic     Past Medical History:   Diagnosis Date     Benign paroxysmal positional vertigo, left 7/10/2019     HLD (hyperlipidemia)      PVC's (premature ventricular contractions)      SI (sacroiliac) joint dysfunction       Past Surgical History:   Procedure Laterality Date     Bunion surgery  2017      SECTION  5 years ago, 35 years ago     ELBOW SURGERY Right      MAMMOPLASTY AUGMENTATION  23 years ago     OPTICAL TRACKING SYSTEM FUSION SACRAL ILIAC Right 2019    Procedure: Right Minimal Invasive Sacral Iliac Joint Fusion;  Surgeon: Mac Urbina MD;  Location: [facility] OR      Allergies   Allergen Reactions     Bees Shortness Of Breath     Cellulitis of the leg      Social History     Tobacco Use     Smoking status: Never Smoker     Smokeless tobacco: Never Used   Substance Use Topics     Alcohol use: Not Currently      Wt Readings from Last 1 Encounters:   21 77.6 kg (171 lb 1.2 oz)        Anesthesia Evaluation   Pt has had prior anesthetic.     History of anesthetic complications  - motion sickness and PONV.  Severe.    ROS/MED HX  ENT/Pulmonary:  - neg pulmonary ROS  (-) tobacco use, asthma and sleep apnea   Neurologic: Comment: BPPV     (-) no seizures and no CVA   Cardiovascular: Comment: Holter monitor -  Sinus rhythm with 12,651 PVCs, 13% of all beats. No evidence for sustained arrythmias.      (+) Dyslipidemia -----dysrhythmias, PVCs, Previous cardiac testing   Echo: Date: Results:    Stress Test: Date:  Results:  1. Normal stress echocardiogram with no inducible wall motion  abnormalities at stress.   2. No diagnostic ischemic EKG changes during stress.   3. Occasional PVCs with rest extinguished with effort.  4. LV function normal. Visually estimated EF 60-65% at rest.  5. Normal regional wall motion at rest.   6. Normal heart rate and blood pressure response to stress.   7. No pericardial effusion.   8. No anginal symptoms with exercise.  9. Target heart rate achieved.   10. Normal exercise capacity.   11. Oxygen saturation remains normal with stress    ECG Reviewed: Date: 2017 Results:  Sinus rhythm with frequent PVCs  Cath: Date: Results:      METS/Exercise Tolerance: 4 - Raking leaves, gardening Comment: Walks dog 6x/day, uses recumbant bike x45 min regularly.    Hematologic:    (-) history of blood clots and history of blood transfusion   Musculoskeletal: Comment: SI joint dysfunction    Cervical arthritis     Ulnocarpal impingement syndrome  Hallux valgus with bunions          GI/Hepatic: Comment: Mild GERD, untreated      (+) cholecystitis/cholelithiasis,  (-) liver disease   Renal/Genitourinary:  - neg Renal ROS     Endo:  - neg endo ROS  (-) Type II DM   Psychiatric/Substance Use: Comment: Claustrophobia        Infectious Disease:  - neg infectious disease ROS     Malignancy:  - neg malignancy ROS     Other:  - neg other ROS          Physical Exam    Airway  airway exam normal      Mallampati: I   TM distance: > 3 FB   Neck ROM: full   Mouth opening: > 3 cm    Respiratory Devices and Support         Dental  no notable dental history         Cardiovascular   cardiovascular exam normal       Rhythm and rate: regular and normal     Pulmonary   pulmonary exam normal        breath sounds clear to auscultation           OUTSIDE LABS:  CBC:   Lab Results   Component Value Date    WBC 7.1 07/19/2021    HGB 13.6 07/19/2021    HGB 13.9 08/22/2019    HCT 40.7 07/19/2021     07/19/2021     BMP:   Lab Results   Component Value Date     07/19/2021    POTASSIUM 4.1 07/19/2021     CHLORIDE 107 07/19/2021    CO2 27 07/19/2021    BUN 18 07/19/2021    CR 0.62 07/19/2021    GLC 96 08/11/2021    GLC 90 07/19/2021     COAGS: No results found for: PTT, INR, FIBR  POC:   Lab Results   Component Value Date    HCG Negative 08/22/2019     HEPATIC:   Lab Results   Component Value Date    ALBUMIN 4.2 07/19/2021    PROTTOTAL 7.9 07/19/2021    ALT 22 07/19/2021    AST 11 07/19/2021    ALKPHOS 84 07/19/2021    BILITOTAL 0.3 07/19/2021     OTHER:   Lab Results   Component Value Date    JOCE 9.3 07/19/2021       Anesthesia Plan    ASA Status:  3   NPO Status:  NPO Appropriate    Anesthesia Type: General.     - Airway: ETT   Induction: Intravenous, Propofol.   Maintenance: Balanced.   Techniques and Equipment:     - Lines/Monitors: 2nd IV, BIS     - Blood: T&S     Consents    Anesthesia Plan(s) and associated risks, benefits, and realistic alternatives discussed. Questions answered and patient/representative(s) expressed understanding.     - Discussed with:  Patient    Use of blood products discussed: Yes.     - Discussed with: Patient.     - Consented: consented to blood products            Reason for refusal: other.     Postoperative Care    Pain management: Multi-modal analgesia, Oral pain medications, IV analgesics.   PONV prophylaxis: Ondansetron (or other 5HT-3), Dexamethasone or Solumedrol, Background Propofol Infusion     Comments:                Mansoor Rowan DO

## 2021-08-11 NOTE — BRIEF OP NOTE
Brief Operative Note    Preop Dx:   SI (sacroiliac) joint dysfunction [M53.3]  Post op Dx:   Same  Procedure:    Procedure(s):  Revision right sacroiliac joint fusion with iliosacral screws and open posterior joint debridement and grafting, use of O-Arm/stealth navigation, allograft, local autograft, and bone morphogenic  Surgeon:     Mac Urbina MD   Assistants:    Claudia Escobar PA-C  Anesthesia:   General  EBL:    50mL   Total IV Fluids:  See Anesthesia Record  Specimens:   none  Findings:   See Operative Dictation      Assessment and Plan: Kristal Lester is a 61 year old female with PMH including BPPV, GERD, cholecystitis/lithiasis, claustrophobia, s/p R SI joint fusion 2019 with incomplete relief of symptoms  now s/p above procedure on 8/11/21 with Dr. Urbina.     Carlitos Primary  Activity: Up with assist until independent. No excessive bending or twisting. No lifting >10 lbs x 6 weeks.    Weight bearing status: 50% weight bearing on RIGHT leg x 4 weeks with crutches/ walker.  Pain management:  PO as tolerated. No NSAIDs for 3 months   Antibiotics: Ancef [id number]  Diet: Begin with clear fluids and progress diet as tolerated.   DVT prophylaxis: SCDs only. No chemical DVT ppx needed.  Imaging: none  Labs: none  Bracing/Splinting: None.  Dressings: Keep Primapore & tegaderm dressings c/d/i x 48 hours.  Drains: none  Mir catheter: none.   Physical Therapy/Occupational Therapy: none.  Cultures: none.    Consults: hospitalist.  Follow-up: Clinic with Dr. Urbina in 6 weeks with repeat x-rays.   Disposition: Pending progress with therapies, pain control on orals, and medical stability, anticipate discharge to home on POD #1-3.        The procedure was medically necessary for an assistant. My assistance was necessary for patient positioning, prepping and draping, soft tissue retraction, and closure. The assistance that I provided reduced operative time which meant less general anesthetic for the  patient.    Claudia Escobar PA-C    Thank you for allowing me to participate in this patient's care. Please page me directly any questions/concerns.   Securely message with the Vocera Web Console (learn more here)  Text page via [facility] Paging/Directory    If there is no response, if it is a weekend, or if it is during evening hours, please page the orthopaedic surgery resident on call via [facility] Paging/Directory    Implants:   Implant Name Type Inv. Item Serial No.  Lot No. LRB No. Used Action   GRAFT BONE INFUSE BMP  [id number] - [id number] Bone/Tissue Synthetic GRAFT BONE INFUSE BMP  [id number] [employer], [facility] [id number] Right 1 Implanted   GRAFT BONE CRUSH CANC 30ML [id number] - [id number] Bone/Tissue/Biologic GRAFT BONE CRUSH CANC 30ML [id number] [employer] JAMES  Right 1 Implanted   GRAFT BONE CRUSH CANC 30ML [id number] - [id number] Bone/Tissue/Biologic GRAFT BONE CRUSH CANC 30ML [id number] MUSCULOSKELETAL JAMES  Right 1 Implanted   iFuse Implant System 7.0 x 65 mm    [employer] [id number] Right 1 Explanted   150mm fully threaded screw    [employer]  Right 1 Implanted   IMP WASHER SYN 13.0MM .99 - [id number] Metallic Hardware/[place] IMP WASHER SYN 13.0MM .99  [facility]  Right 2 Implanted   IMP SCR CAN 6.5X50MM FT .466 - [id number] Metallic Hardware/[place] IMP SCR CAN 6.5X50MM FT .466  [employer]  Right 1 Implanted   GRAFT BONE CRUSH CANC 30ML [id number] - [id number] Bone/Tissue/Biologic GRAFT BONE CRUSH CANC 30ML [id number] MUSCULOSKELETAL JAMES  Right 1 Implanted

## 2021-08-11 NOTE — CONSULTS
HOSPITALIST CONSULTATION     REQUESTING PHYSICIAN: Mac Urbina MD    REASON FOR CONSULTATION: Evaluation, Recommendations and Co-management of Medical Comorbidities.     ASSESSMENT & PLAN :     Kristal Lester is a 61 year old female admitted on 8/11/2021 s/p following procedure:    Pre op diagnosis:  SI (sacroiliac) joint dysfunction         Revision right sacroiliac joint fusion with iliosacral screws and open posterior joint debridement and grafting, use of O-Arm/stealth navigation, allograft, local autograft, and bone morphogenic  By Dr Urbina.   EBL: ~50 ml.   No complication reported.     PMH, Pre Op eval reviewed. Currently doing well.        # Orthopedic Surgery:     Post Op Management per Primary team.   Hemodynamics: stable currently.   Continue on gentle IV fluids, until adequate PO.    Post op 24 hr capnography per protocol.   Pain control- per Primary team and/or Pain team.    Minimize use of narcotics as able.   Consider bowel regimen with narcotics.   Encourage Incentive spirometry to prevent atelectasis.  DVT prophylaxis- per Primary team. SCDs only. No chemical DVT ppx needed.  Activity, antibiotics, wound and/or drain care - per Primary team. Ancef x 24hours.    Anemia of acute blood loss: Pre op Hgb 13.6.   Monitor hgb for anemia of acute blood loss. Transfuse for hgb <7.0    #Cardiovascular:   No significant history. Currently doing well, stable vitals.  Stress test 2017: no ischemia  Holter 2017: Sinus rhythm with PVCs. No evidence for sustained arrhythmias.  Continue to monitor.    #Pulmonary:  No concern.    #Hot flashes:  Continue PTA hormone therapy    #Insomnia:  Continue PTA lorazepam at bedtime    No other acute medical concern.        Rest per primary.     Thank you for the consultation. Please page with any question. Hospitalist team to follow.      Speedy Felix MD   Hospitalist, Internal Medicine      CHIEF COMPLAINT: Currently  doing well, postop.    HISTORY OF PRESENT ILLNESS: Obtained from the patient and chart review including Pre op evaluation, procedure note.    61 year old year old female  with above discussed medical problems s/p above procedure admitted on 2021  for post op care and monitoring  (for further details for indication of surgery and operative note, please refer to Mac Urbina MD note).   No documented hypotension, hypoxemia or other significant complications intra or post operative.   Medicine consulted to evaluate, recommend and/or co manage medical co morbidities.  Currently: Hemodynamics stable. Incisional Pain controlled. Denies any chest pain, cough, shortness of breath or LH or palpitations. Denies any nausea, vomiting or pain abdomen. No fever or chills. Denies any dysuria.  Denies any new rash. No new focal neuro deficit. No other concern.   Medical issues as discussed above.     PAST MEDICAL HISTORY:   Past Medical History:   Diagnosis Date     Benign paroxysmal positional vertigo, left 7/10/2019     HLD (hyperlipidemia)      PVC's (premature ventricular contractions)      SI (sacroiliac) joint dysfunction      PAST SURGICAL HISTORY:   Past Surgical History:   Procedure Laterality Date     Bunion surgery  2017      SECTION  5 years ago, 35 years ago     ELBOW SURGERY Right      MAMMOPLASTY AUGMENTATION  23 years ago     OPTICAL TRACKING SYSTEM FUSION SACRAL ILIAC Right 2019    Procedure: Right Minimal Invasive Sacral Iliac Joint Fusion;  Surgeon: Mac Urbina MD;  Location: [facility] OR       : reviewed.     Family History   Problem Relation Age of Onset     Chronic Obstructive Pulmonary Disease Father      Heart Disease Father      Hyperlipidemia Father      Scleroderma Mother      Mental Illness Brother      Alzheimer Disease Maternal Grandmother      Parkinsonism Maternal Grandfather      Anesthesia Reaction No family hx of      Deep Vein Thrombosis (DVT) No family  hx of         : reviewed.     Social History     Socioeconomic History     Marital status:      Spouse name: None     Number of children: None     Years of education: None     Highest education level: None   Occupational History     None   Tobacco Use     Smoking status: Never Smoker     Smokeless tobacco: Never Used   Substance and Sexual Activity     Alcohol use: Not Currently     Drug use: Not Currently     Sexual activity: None   Other Topics Concern     None   Social History Narrative     None     Social Determinants of Health     Financial Resource Strain:      Difficulty of Paying Living Expenses:    Food Insecurity:      Worried About Running Out of Food in the Last Year:      Ran Out of Food in the Last Year:    Transportation Needs:      Lack of Transportation (Medical):      Lack of Transportation (Non-Medical):    Physical Activity:      Days of Exercise per Week:      Minutes of Exercise per Session:    Stress:      Feeling of Stress :    Social Connections:      Frequency of Communication with Friends and Family:      Frequency of Social Gatherings with Friends and Family:      Attends [employer] Services:      Active Member of Clubs or Organizations:      Attends Club or Organization Meetings:      Marital Status:    Intimate Partner Violence:      Fear of Current or Ex-Partner:      Emotionally Abused:      Physically Abused:      Sexually Abused:        ALLERGIES:   Allergies   Allergen Reactions     Bees Shortness Of Breath     Cellulitis of the leg         HOME MEDICATIONS:     Prior to Admission medications    Medication Sig Start Date End Date Taking? Authorizing Provider   diphenhydrAMINE-APAP, sleep, (TYLENOL PM EXTRA STRENGTH PO) Take by mouth daily as needed    Yes Reported, Patient   escitalopram (LEXAPRO) 10 MG tablet Take 10 mg by mouth every morning    Yes Reported, Patient   estradiol (VIVELLE-DOT) 0.05 MG/24HR bi-weekly patch Place 1 patch onto the skin twice a week    Yes  Reported, Patient   LORazepam (ATIVAN) 0.5 MG tablet Take 0.5 mg by mouth At Bedtime    Yes Reported, Patient   oxyCODONE-acetaminophen (PERCOCET) 5-325 MG tablet Take 1 tablet by mouth every 6 hours as needed for severe pain   Yes Reported, Patient   progesterone (PROMETRIUM) 100 MG capsule Take 100 mg by mouth At Bedtime    Yes Reported, Patient   rosuvastatin (CRESTOR) 10 MG tablet Take 10 mg by mouth At Bedtime 8/2/19  Yes Reported, Patient   Calcium 200 MG TABS Take by mouth every morning     Reported, Patient   cyclobenzaprine (FLEXERIL) 5 MG tablet Take 1-2 tablets (5-10 mg) by mouth nightly as needed for muscle spasms 3/1/21   Santiago Gan MD   diclofenac (VOLTAREN) 75 MG EC tablet Take 1 tablet (75 mg) by mouth 2 times daily as needed  Patient taking differently: Take 75 mg by mouth 2 times daily as needed  3/1/21   Santiago Gan MD   EPINEPHrine (EPIPEN/ADRENACLICK/OR ANY BX GENERIC EQUIV) 0.3 MG/0.3ML injection 2-pack Inject 0.3 mg into the muscle as needed for anaphylaxis    Reported, Patient   order for DME SI-loc belt 6/8/20   Mac Urbina MD   vitamin D3 (CHOLECALCIFEROL) 2000 units (50 mcg) tablet Take 1 tablet by mouth every morning     Reported, Patient       CURRENT MEDICATIONS:    Current Facility-Administered Medications   Medication     [START ON 8/14/2021] acetaminophen (TYLENOL) tablet 650 mg     acetaminophen (TYLENOL) tablet 975 mg     benzocaine-menthol (CEPACOL) 15-3.6 MG lozenge 1 lozenge     bisacodyl (DULCOLAX) Suppository 10 mg     ceFAZolin (ANCEF) 1 g vial to attach to  ml bag for ADULT or 50 ml bag for PEDS     famotidine (PEPCID) tablet 20 mg    Or     famotidine (PEPCID) infusion 20 mg     HYDROmorphone (DILAUDID) injection 0.2 mg    Or     HYDROmorphone (DILAUDID) injection 0.4 mg     hydrOXYzine (ATARAX) tablet 25 mg     lidocaine (LMX4) cream     lidocaine 1 % 0.1-1 mL     magnesium hydroxide (MILK OF MAGNESIA) suspension 30 mL      "methocarbamol (ROBAXIN) tablet 750 mg     naloxone (NARCAN) injection 0.2 mg    Or     naloxone (NARCAN) injection 0.4 mg    Or     naloxone (NARCAN) injection 0.2 mg    Or     naloxone (NARCAN) injection 0.4 mg     ondansetron (ZOFRAN-ODT) ODT tab 4 mg    Or     ondansetron (ZOFRAN) injection 4 mg     oxyCODONE (ROXICODONE) tablet 5 mg    Or     oxyCODONE (ROXICODONE) tablet 10 mg     [START ON 2021] polyethylene glycol (MIRALAX) Packet 17 g     prochlorperazine (COMPAZINE) injection 10 mg    Or     prochlorperazine (COMPAZINE) tablet 10 mg     senna-docusate (SENOKOT-S/PERICOLACE) 8.6-50 MG per tablet 1 tablet     sodium chloride (PF) 0.9% PF flush 3 mL     sodium chloride (PF) 0.9% PF flush 3 mL     [START ON 2021] Vitamin D3 (CHOLECALCIFEROL) tablet 50 mcg         ROS: 10 point ROS neg other than the symptoms noted above in the HPI.    PHYSICAL EXAMINATION:     /59 (BP Location: Left arm)   Pulse 83   Temp 98.7  F (37.1  C) (Oral)   Resp 17   Ht 1.651 m (5' 5\")   Wt 77.6 kg (171 lb 1.2 oz)   SpO2 95%   BMI 28.47 kg/m    Temp (24hrs), Av.9  F (36.6  C), Min:97.5  F (36.4  C), Max:98.7  F (37.1  C)      BMI= Body mass index is 28.47 kg/m .      Intake/Output Summary (Last 24 hours) at 2021 1507  Last data filed at 2021 1409  Gross per 24 hour   Intake 1434 ml   Output 1290 ml   Net 144 ml       General: Awake, interactive, NAD.   HEENT: AT/NC. No icterus. Moist MM.    Neck: Supple.    Heart/CVS: Normal S1 and S2. Regular.   Chest/Respi: Normal work of breathing. CTA BL.   Abdomen/GI: Soft, non distended, non tender. No g/r.  Extremities/MSK: Distal pulses 2+, well perfused. Rest per ortho.   Neuro: Alert and oriented x4. Grossly non focal.   Skin:  No new rash over exposed areas.       LABORATORY DATA: reviewed.     Recent Results (from the past 24 hour(s))   EKG 12-lead, tracing only    Collection Time: 21  6:12 AM   Result Value Ref Range    Systolic Blood Pressure  " mmHg    Diastolic Blood Pressure  mmHg    Ventricular Rate 66 BPM    Atrial Rate 66 BPM    VA Interval 182 ms    QRS Duration 86 ms     ms    QTc 417 ms    P Axis 58 degrees    R AXIS 86 degrees    T Axis 66 degrees    Interpretation ECG       Sinus rhythm  Normal ECG  No previous ECGs available  Confirmed by fellow Reyes Castro, Jorge ([id number]) on 8/11/2021 9:06:14 AM  Confirmed by MD GRAHAM WAYNE ([id number]) on 8/11/2021 10:16:53 AM     Glucose by meter    Collection Time: 08/11/21  6:20 AM   Result Value Ref Range    GLUCOSE BY METER POCT 96 70 - 99 mg/dL       Recent Results (from the past 24 hour(s))   XR Surgery KAYLAH L/T 5 Min Fluoro    Narrative    This exam was marked as non-reportable because it will not be read by a   radiologist or a [place] non-radiologist provider.                 Speedy Felix MD        This note was in part completed with Dragon, a speech recognition software. Some grammatical and transcription errors may have occurred. If you have any concern, please contact me for clarification.

## 2021-08-11 NOTE — ANESTHESIA POSTPROCEDURE EVALUATION
Patient: Kristal Lester    Procedure(s):  Revision right sacroiliac joint fusion with iliosacral screws and open posterior joint debridement and grafting, use of O-Arm/stealth navigation, allograft, local autograft, and bone morphogenic    Diagnosis:SI (sacroiliac) joint dysfunction [M53.3]  Diagnosis Additional Information: No value filed.    Anesthesia Type:  General    Note:  Disposition: Admission   Postop Pain Control: Uneventful            Sign Out: Well controlled pain   PONV: No   Neuro/Psych: Uneventful            Sign Out: Acceptable/Baseline neuro status   Airway/Respiratory: Uneventful            Sign Out: Acceptable/Baseline resp. status   CV/Hemodynamics: Uneventful            Sign Out: Acceptable CV status; No obvious hypovolemia; No obvious fluid overload   Other NRE: NONE   DID A NON-ROUTINE EVENT OCCUR? No           Last vitals:  Vitals Value Taken Time   /68 08/11/21 1200   Temp 36.4  C (97.5  F) 08/11/21 1130   Pulse 72 08/11/21 1201   Resp 18 08/11/21 1201   SpO2 98 % 08/11/21 1201   Vitals shown include unvalidated device data.    Electronically Signed By: Mansoor Rowan DO  August 11, 2021  12:01 PM

## 2021-08-11 NOTE — PLAN OF CARE
"  Vitals /59 (BP Location: Left arm)   Pulse 83   Temp 98.7  F (37.1  C) (Oral)   Resp 17   Ht 1.651 m (5' 5\")   Wt 77.6 kg (171 lb 1.2 oz)   SpO2 95%   BMI 28.47 kg/m    VSS on 2L NC     A+O A+Ox4   Output Voiding via lauren  LBM: PTA   Respiratory WDL   Cardiac WDL x Pt was lethargic this afternoon. Arousal to voice. RR decreased to 7 while sleeping, alarming capno. Ortho paged, oxycodone decreased.     Activity Not OOB this shift   Skin 3 incisions, right hip, left hip, and spine. CDI   Pain 6/10   Neuro/CSM Denies N/T   GI/ Pt notes constipation with opiate pain medications   LDA Left PIV, SL   Equipment IV pump, pole  capno   Plan Continue plan of care   Additional                        "

## 2021-08-11 NOTE — ANESTHESIA PROCEDURE NOTES
Airway       Patient location during procedure: OR       Procedure Start/Stop Times: 8/11/2021 7:46 AM  Staff -        Performed By: CRNA  Consent for Airway        Urgency: elective  Indications and Patient Condition       Indications for airway management: tamy-procedural       Induction type:intravenous       Mask difficulty assessment: 1 - vent by mask    Final Airway Details       Final airway type: endotracheal airway       Successful airway: Oral  Endotracheal Airway Details        Cuffed: yes       Successful intubation technique: direct laryngoscopy       DL Blade Type: MAC 3       Grade View of Cords: 2       Adjucts: stylet       Position: Right       Measured from: lips       Secured at (cm): 22       Bite block used: None    Post intubation assessment        Placement verified by: capnometry, equal breath sounds and chest rise        Number of attempts at approach: 1       Number of other approaches attempted: 0       Secured with: silk tape       Ease of procedure: easy       Dentition: Intact

## 2021-08-11 NOTE — OP NOTE
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 Removal of SI Bone Triangular Implant from S1  2. Lateral Bone Grafting through the defect created by the implant removal with allograft and bone morphogenic protein  3. O-Arm Guided placement of Ilio-Sacral Screws, 6.5mm cannulated Synthes  4. Open posterior debridement and fusion of SI Joint using allograft and bone morphogenic protein.    PRIMARY SURGEON: Mac Urbina MD    FIRST ASSISTANT: BLANCHE Haynes, there is no qualified resident available.  The assistant was necessary for retraction visualization and wound closure.    ANESTHESIA: General Endotracheal    COMPLICATIONS:  None.    SPECIMENS: None.    ESTIMATED BLOOD LOSS: 50 mL    INDICATIONS:                          Kristal Lester is a 61 year old female who elected surgical treatment, and understood the indications for this surgery, as well as its risks, benefits, and alternatives as documented in the pre-operative H&P.  Specifically, we reviewed the risks and benefits of the surgery in detail. The risks include, but are not limited to, the general risks associated with anesthesia, including death, pulmonary embolism, DVT, stroke, myocardial infarction, pneumonia, and urinary tract infection. Additional risks specific to the surgery include the risk of infection, dural tear with resultant CSF leak which might necessitate placement of a drain or revision surgery or could result in headaches, nerve injury resulting in weakness or paralysis, risk of adjacent segment disease, the risks of vascular injury, need for revision surgery in the future due to one of the above issues, or risk of incomplete symptom relief. Kristal Lester understands the risks of the surgery and wishes to proceed.  No Guarantees were given.       DESCRIPTION OF PROCEDURE:            Kristal Lester was taken to the operating room, where the Anesthesiology Service induced satisfactory general anesthesia. Ancef was given IV.  Venous thromboembolic prophylaxis was performed with sequential devices.  A Mir catheter was placed under standard sterile techniques.  The patient was placed prone on an open OSI frame with the abdomen hanging free and all bony prominences well padded.  The low back was then prepped and draped in its entirety in the usual sterile fashion.  We then held a multidisciplinary time out in which we verified the patient, procedure, antibiotics, and operative plan.  All team members were in agreement.    I started by attaching a reference frame to her left posterior superior iliac spine.  We then brought in the navigation.  I took initial spin.    I then opened up her previous lateral incision.  I used the navigated drill guide to identify the S1 triangular implant.  I placed a guidepin into it using the navigation.  We then serially dilated over this and then I used a expandable bladed retractor to expose the posterior aspect of the implant.  I then docked using the SI bone removal tools onto the back of the implant and we used a series of osteotome cuts to loosen the implant from the surrounding bone.  Once it was sufficiently loosened, I then used a back slap to withdraw it from the bone.  It seemed to be partially fixed to the ilium and it did require a moderate amount of force to detach it here but we were able to remove it with relatively minimal bone loss.    After the removal of the implant, we thoroughly irrigated.  I then used a trough and final to pack approximately 50 cc of crushed cancellous allograft as well as 3 pieces of a large kit of the bone morphogenic protein into the defect to try and revise the fusion from the ilium into the S1 level.    We then took another spin to make sure we had updated navigation guidance.  I then used the navigated drill guide  to place a through and through iliosacral screw starting on the lateral cortex of the ilium, traversing the entire body of S1 and then achieving purchase on the contralateral left-sided ilium.  The screw was a 150 mm 6.5 mm cannulated screw.    I then placed a second screw down closer into the body of S2.  There is only room for one through and through screw so this was a shorter 50 mm screw.  Both screws had a washer and they had excellent purchase.  I then took a third navigated spin and we verified that all the screws were intraosseous.    I then turned my attention to the open posterior debridement and fusion.  I used the navigated guide to jordon out the right sacroiliac joint on the dorsal aspect of the patient's skin.  I then made a 4 cm longitudinal incision and dissected down to the posterior superior iliac spine.  I mobilized the medial paraspinal muscles off of this and exposed the dorsal sacrum.  I then cauterized down into the dorsal sacroiliac joint.  We exposed approximately a 4 cm linear window.  I then used the bur to thoroughly decorticate this area until I could visualize the remaining S2 implant and I could see with the navigation that we were down into the ventral portion of the joint.  After thoroughly decorticating this with a bur we then packed in roughly 30 cc of crushed cancellous allograft and the remaining 3 strips of bone morphogenic protein to complete the posterior fusion.    The wounds were closed in multiple layers of interrupted Vicryl with 3-0 Monocryl and Dermabond for the skin.  We injected 60 cc of quarter percent Marcaine with epinephrine for local anesthetic.  There were no immediately evident complications.    I was present and scrubbed for the entire procedure.    Mac Urbina MD

## 2021-08-12 ENCOUNTER — APPOINTMENT (OUTPATIENT)
Dept: PHYSICAL THERAPY | Facility: CLINIC | Age: 61
DRG: 460 | End: 2021-08-12
Attending: PHYSICIAN ASSISTANT
Payer: COMMERCIAL

## 2021-08-12 ENCOUNTER — APPOINTMENT (OUTPATIENT)
Dept: GENERAL RADIOLOGY | Facility: CLINIC | Age: 61
DRG: 460 | End: 2021-08-12
Attending: PHYSICIAN ASSISTANT
Payer: COMMERCIAL

## 2021-08-12 ENCOUNTER — APPOINTMENT (OUTPATIENT)
Dept: OCCUPATIONAL THERAPY | Facility: CLINIC | Age: 61
DRG: 460 | End: 2021-08-12
Attending: ORTHOPAEDIC SURGERY
Payer: COMMERCIAL

## 2021-08-12 VITALS
RESPIRATION RATE: 16 BRPM | WEIGHT: 171.08 LBS | OXYGEN SATURATION: 97 % | HEART RATE: 61 BPM | HEIGHT: 65 IN | DIASTOLIC BLOOD PRESSURE: 47 MMHG | BODY MASS INDEX: 28.5 KG/M2 | TEMPERATURE: 95.6 F | SYSTOLIC BLOOD PRESSURE: 107 MMHG

## 2021-08-12 LAB
GLUCOSE BLDC GLUCOMTR-MCNC: 122 MG/DL (ref 70–99)
HGB BLD-MCNC: 12.7 G/DL (ref 11.7–15.7)
HOLD SPECIMEN: NORMAL

## 2021-08-12 PROCEDURE — 250N000013 HC RX MED GY IP 250 OP 250 PS 637: Performed by: PHYSICIAN ASSISTANT

## 2021-08-12 PROCEDURE — 97530 THERAPEUTIC ACTIVITIES: CPT | Mod: GP | Performed by: PHYSICAL THERAPIST

## 2021-08-12 PROCEDURE — 72190 X-RAY EXAM OF PELVIS: CPT | Mod: 26 | Performed by: RADIOLOGY

## 2021-08-12 PROCEDURE — 97535 SELF CARE MNGMENT TRAINING: CPT | Mod: GO

## 2021-08-12 PROCEDURE — 97165 OT EVAL LOW COMPLEX 30 MIN: CPT | Mod: GO

## 2021-08-12 PROCEDURE — 250N000011 HC RX IP 250 OP 636: Performed by: PHYSICIAN ASSISTANT

## 2021-08-12 PROCEDURE — 36415 COLL VENOUS BLD VENIPUNCTURE: CPT | Performed by: PHYSICIAN ASSISTANT

## 2021-08-12 PROCEDURE — 85018 HEMOGLOBIN: CPT | Performed by: PHYSICIAN ASSISTANT

## 2021-08-12 PROCEDURE — 72190 X-RAY EXAM OF PELVIS: CPT

## 2021-08-12 PROCEDURE — 99232 SBSQ HOSP IP/OBS MODERATE 35: CPT | Performed by: INTERNAL MEDICINE

## 2021-08-12 PROCEDURE — 250N000013 HC RX MED GY IP 250 OP 250 PS 637: Performed by: INTERNAL MEDICINE

## 2021-08-12 PROCEDURE — 97161 PT EVAL LOW COMPLEX 20 MIN: CPT | Mod: GP | Performed by: PHYSICAL THERAPIST

## 2021-08-12 PROCEDURE — 36415 COLL VENOUS BLD VENIPUNCTURE: CPT | Performed by: ORTHOPAEDIC SURGERY

## 2021-08-12 PROCEDURE — 97116 GAIT TRAINING THERAPY: CPT | Mod: GP | Performed by: PHYSICAL THERAPIST

## 2021-08-12 RX ORDER — OXYCODONE HYDROCHLORIDE 5 MG/1
5-10 TABLET ORAL EVERY 4 HOURS PRN
Qty: 30 TABLET | Refills: 0 | Status: SHIPPED | OUTPATIENT
Start: 2021-08-12 | End: 2021-08-12

## 2021-08-12 RX ORDER — OXYCODONE HYDROCHLORIDE 5 MG/1
5-10 TABLET ORAL EVERY 4 HOURS PRN
Qty: 30 TABLET | Refills: 0 | Status: SHIPPED | OUTPATIENT
Start: 2021-08-12

## 2021-08-12 RX ORDER — ACETAMINOPHEN 325 MG/1
650 TABLET ORAL EVERY 4 HOURS PRN
Qty: 100 TABLET | Refills: 0 | Status: SHIPPED | OUTPATIENT
Start: 2021-08-12

## 2021-08-12 RX ORDER — AMOXICILLIN 250 MG
1-2 CAPSULE ORAL 2 TIMES DAILY
Qty: 30 TABLET | Refills: 0 | Status: SHIPPED | OUTPATIENT
Start: 2021-08-12

## 2021-08-12 RX ORDER — POLYETHYLENE GLYCOL 3350 17 G/17G
1 POWDER, FOR SOLUTION ORAL DAILY
Qty: 7 PACKET | Refills: 0 | Status: SHIPPED | OUTPATIENT
Start: 2021-08-12

## 2021-08-12 RX ADMIN — OXYCODONE HYDROCHLORIDE 5 MG: 5 TABLET ORAL at 00:56

## 2021-08-12 RX ADMIN — METHOCARBAMOL 750 MG: 750 TABLET ORAL at 04:41

## 2021-08-12 RX ADMIN — DOCUSATE SODIUM AND SENNOSIDES 1 TABLET: 8.6; 5 TABLET ORAL at 08:10

## 2021-08-12 RX ADMIN — ACETAMINOPHEN 975 MG: 325 TABLET, FILM COATED ORAL at 00:56

## 2021-08-12 RX ADMIN — Medication 50 MCG: at 08:10

## 2021-08-12 RX ADMIN — HYDROXYZINE HYDROCHLORIDE 25 MG: 25 TABLET, FILM COATED ORAL at 01:05

## 2021-08-12 RX ADMIN — ESCITALOPRAM OXALATE 10 MG: 10 TABLET ORAL at 08:10

## 2021-08-12 RX ADMIN — ACETAMINOPHEN 975 MG: 325 TABLET, FILM COATED ORAL at 08:10

## 2021-08-12 RX ADMIN — POLYETHYLENE GLYCOL 3350 17 G: 17 POWDER, FOR SOLUTION ORAL at 08:12

## 2021-08-12 RX ADMIN — OXYCODONE HYDROCHLORIDE 5 MG: 5 TABLET ORAL at 04:41

## 2021-08-12 RX ADMIN — FAMOTIDINE 20 MG: 20 TABLET ORAL at 08:10

## 2021-08-12 NOTE — PLAN OF CARE
Physical Therapy Discharge Summary    Reason for therapy discharge:    Discharged to home.    Progress towards therapy goal(s). See goals on Care Plan in [facility] electronic health record for goal details.  Goals met    Therapy recommendation(s):    No further therapy is recommended.

## 2021-08-12 NOTE — PLAN OF CARE
"      VS:   /58 (BP Location: Left arm)   Pulse 58   Temp (!) 96.3  F (35.7  C) (Axillary)   Resp 14   Ht 1.651 m (5' 5\")   Wt 77.6 kg (171 lb 1.2 oz)   SpO2 95%   BMI 28.47 kg/m   on RA, denies SOB or CP, capno WNL   Output:   Voiding without difficulty in BR, good amounts, no BM    Activity:   Up independently, steady gait with walker, 50% wt bearing on right leg, ambulating to BR, repositions self in bed    Skin: Intact except incisions   Pain:   Has been tolerable with prn oxycodone 5mg,    Neuro/CMS:   Intact, denies numbness or tingling, good strength, A&Ox4, calm and cooperative,    Dressing(s):   Dressings x3- CDI   Diet:   Regular diet, denies nausea, no swallowing difficulty noted, no abdominal discomfort reported    LDA:   PIV, no drain   Equipment:   IV pole, walker, PCDs, call light    Plan:   Possible discharge today   Additional Info:   Monitor pain, able to make needs known, call light in reach length, will continue to monitor and assist.       "

## 2021-08-12 NOTE — PLAN OF CARE
"      VS: /47 (BP Location: Left arm)   Pulse 61   Temp (!) 95.6  F (35.3  C) (Axillary)   Resp 16   Ht 1.651 m (5' 5\")   Wt 77.6 kg (171 lb 1.2 oz)   SpO2 97%   BMI 28.47 kg/m       Output: Voiding spontaneously without difficulty, BM 8/10/21.   Lungs: Lung sounds clear, room air, denies SOB.   Activity: SBA with walker, 50% WBAT on right leg, able to adjust position in bed independently.   Skin: Intact ex incisions on lower back.   Pain:   Pain managed with prn oxycodone, scheduled tylenol.   Neuro/CMS:   A&Ox4, CMS intact, good strengths in all extremities.   Dressing(s):   [place] dressings on back C/D/I/   Diet:   Regular diet, tolerating well.   LDA:   None.   Equipment:   Walker.   Plan:   Pt discharging to home today.   Additional Info:   Post-op x-rays completed. Call light within reach, pt able to make needs known.     Patient left unit at 1430 via wheelchair with hospital transport and all personal belongings. Patient is discharging to her home and is being picked up by her friend, Skye. Discharge paperwork and medications were reviewed with patient prior to discharge, and all discharge medications and security items were given to patient.  "

## 2021-08-12 NOTE — PROGRESS NOTES
08/12/21 1200   Quick Adds   Type of Visit Initial Occupational Therapy Evaluation   Living Environment   People in home alone   Current Living Arrangements apartment   Home Accessibility no concerns   Transportation Anticipated family or friend will provide   Living Environment Comments Has tub/shower with grab bars. Grab bar by toilet. Will have friends checking in on her.    Self-Care   Current Activity Tolerance moderate   Regular Exercise Yes   Activity/Exercise Type strength training;biking;walking   Exercise Amount/Frequency 3-5 times/wk   Equipment Currently Used at Home crutches;grab bar, toilet;grab bar, tub/shower   Disability/Function   Hearing Difficulty or Deaf no   Wear Glasses or Blind no   Concentrating, Remembering or Making Decisions Difficulty no   Difficulty Communicating no   Difficulty Eating/Swallowing no   Walking or Climbing Stairs Difficulty no   Dressing/Bathing Difficulty no   Toileting issues no   Doing Errands Independently Difficulty (such as shopping) no   Fall history within last six months yes   Number of times patient has fallen within last six months 1   General Information   Onset of Illness/Injury or Date of Surgery 08/11/21   Referring Physician Claudia Escobar, PANegarC   Patient/Family Therapy Goal Statement (OT) Home today   Additional Occupational Profile Info/Pertinent History of Current Problem s/p R SI fusion revision   Existing Precautions/Restrictions spinal;weight bearing   Left Upper Extremity (Weight-bearing Status) partial weight-bearing (PWB)  (10 lbs)   Right Upper Extremity (Weight-bearing Status) partial weight-bearing (PWB)  (10 lbs)   Right Lower Extremity (Weight-bearing Status) partial weight-bearing (PWB)  (50%)   Cognitive Status Examination   Cognitive Status Comments no concerns   Visual Perception   Visual Impairment/Limitations WNL   Sensory   Sensory Quick Adds No deficits were identified   Pain Assessment   Patient Currently in Pain Yes, see Vital  Sign flowsheet   Range of Motion Comprehensive   General Range of Motion no range of motion deficits identified   Strength Comprehensive (MMT)   Comment, General Manual Muscle Testing (MMT) Assessment not formally tested, UE strength appears WFL   Muscle Tone Assessment   Muscle Tone Quick Adds No deficits were identified   Coordination   Upper Extremity Coordination No deficits were identified   Bed Mobility   Comment (Bed Mobility) Supine<>sit with SBA   Transfers   Transfer Comments SBA sit<>stand using FWW   Lower Body Dressing Assessment   [place] Level (Lower Body Dressing) supervision;verbal cues   Assistive Devices (Lower Body Dressing) reacher   Position (Lower Body Dressing) edge of bed sitting   Toileting   [place] Level (Toileting) supervision   Assistive Devices (Toileting) grab bar, toilet   Clinical Impression   Criteria for Skilled Therapeutic Interventions Met (OT) yes   OT Diagnosis Decreased functional mobility and ADLs   OT Problem List-Impairments impacting ADL pain;post-surgical precautions;strength   Assessment of Occupational Performance 3-5 Performance Deficits   Identified Performance Deficits dressing, bathing, toileting, transfers, home mgmt   Planned Therapy Interventions (OT) ADL retraining;transfer training   Clinical Decision Making Complexity (OT) low complexity   Therapy Frequency (OT) 1x eval and treat   Predicted Duration of Therapy 1 day   Anticipated Equipment Needs Upon Discharge (OT) tub bench;reacher;other (see comments)  (elastic shoe laces)   Risk & Benefits of therapy have been explained care plan/treatment goals reviewed;patient   OT Discharge Planning    OT Discharge Recommendation (DC Rec) Home with assist   OT Rationale for DC Rec Patient would benefit from assist with IADLs.    OT Brief overview of current status  SBA for ADLs with DME, SBA sit<>stands.    Total Evaluation Time (Minutes)   Total Evaluation Time (Minutes) 8

## 2021-08-12 NOTE — PROGRESS NOTES
08/12/21 0900   Quick Adds   Type of Visit Initial PT Evaluation   Living Environment   People in home alone   Current Living Arrangements apartment   Home Accessibility no concerns   Transportation Anticipated family or friend will provide   Self-Care   Regular Exercise Yes   Activity/Exercise Type strength training;biking;walking   Exercise Amount/Frequency 3-5 times/wk   Equipment Currently Used at Home crutches   Disability/Function   Walking or Climbing Stairs Difficulty no   Dressing/Bathing Difficulty no   Toileting issues no   Doing Errands Independently Difficulty (such as shopping) no   Fall history within last six months yes  (slipped)   Number of times patient has fallen within last six months 1   General Information   Onset of Illness/Injury or Date of Surgery 08/11/21   Referring Physician JUAN Escobar   Patient/Family Therapy Goals Statement (PT) Pt plans to dc to home   Pertinent History of Current Problem (include personal factors and/or comorbidities that impact the POC) s/p SI fusion revision   Existing Precautions/Restrictions fall   Weight-Bearing Status - LUE full weight-bearing   Weight-Bearing Status - RUE full weight-bearing   Weight-Bearing Status - LLE full weight-bearing   Weight-Bearing Status - RLE partial weight-bearing (% in comments)  (50%)   Cognition   Orientation Status (Cognition) oriented x 4   Affect/Mental Status (Cognition) WNL   Follows Commands (Cognition) WNL   Pain Assessment   Patient Currently in Pain Yes, see Vital Sign flowsheet   Integumentary/Edema   Integumentary/Edema Comments Per surgery, incision site   Range of Motion (ROM)   ROM Quick Adds ROM WFL   Strength   Manual Muscle Testing Quick Adds Strength WFL   Bed Mobility   Comment (Bed Mobility) I bed mob,   Transfers   Transfer Safety Comments SBA sit to stand with crutches   Gait/Stairs (Locomotion)   [place] Level (Gait) supervision;verbal cues   Assistive Device (Gait) crutches, axillary   Distance  in Feet (Required for LE Total Joints) 250'   Pattern (Gait) swing-to   Deviations/Abnormal Patterns (Gait) gait speed decreased   Maintains Weight-bearing Status (Gait) able to maintain   Comment (Gait/Stairs) SBA and cues for steps with i crutch and rail   Balance   Balance Comments decreased standing dynamic with PWB, requires AD    Clinical Impression   Criteria for Skilled Therapeutic Intervention yes, treatment indicated   PT Diagnosis (PT) difficulty walking   Influenced by the following impairments post op pain, WB restrictions   Functional limitations due to impairments impaired functional mob I and safety   Clinical Presentation Stable/Uncomplicated   Clinical Presentation Rationale clinical judgement   Clinical Decision Making (Complexity) low complexity   Therapy Frequency (PT) One time eval and treatment only   Planned Therapy Interventions (PT) gait training;stair training;transfer training   Risk & Benefits of therapy have been explained evaluation/treatment results reviewed;care plan/treatment goals reviewed;risks/benefits reviewed;current/potential barriers reviewed;participants voiced agreement with care plan;participants included;patient   PT Discharge Planning    PT Discharge Recommendation (DC Rec) home with assist   PT Rationale for DC Rec Pt met goals for safe mob with AD and is able to maintain WB restrictions   PT Brief overview of current status  mod I with crutches for amb and transefers, I with toileting   Total Evaluation Time   Total Evaluation Time (Minutes) 10

## 2021-08-12 NOTE — PROGRESS NOTES
Orthopaedic Surgery Progress Note 08/12/2021    S: No acute events overnight.  States she didn't sleep well. Pain controlled on PO pain meds. Tolerating PO. Voiding. Has been out of bed and ambulating with a walker. No numbness or tingling. No concerns this AM.     O:  Temp: (!) 95.6  F (35.3  C) Temp src: Axillary BP: 107/47 Pulse: 61   Resp: 16 SpO2: 97 % O2 Device: None (Room air) Oxygen Delivery: 2 LPM    Exam:  Gen: No acute distress, resting comfortably in bed.  Resp: Non-labored breathing  CV: wwp  MSK:  Spine:  - Dressings c/d/i  - Feet wwp bilaterally    Lumbar Spine:   Motor -    L2-3: Hip flexion R 5/5  And L 5/5 strength         L3/4:  Knee extension R 5/5 and L 5/5 strength        L4/5:  Foot dorsiflexion R 5/5 L 5/5 and      EHL dorsiflexion R 5/5 L 5/5 strength        S1:  Plantarflexion/Peroneal Muscles  R 5/5 and L 5/5 strength   Sensation: intact to light touch L3-S1 distribution BLE        Recent Labs   Lab 08/12/21  0705   HGB 12.7     No results found for: SED    Assessment: Kristal Lester is a 61 year old female with PMH including BPPV, GERD, cholecystitis/lithiasis, claustrophobia, s/p R SI joint fusion 2019 with incomplete relief of symptoms  now s/p above procedure on 8/11/21 with Dr. Urbina.      Ortho Primary  Activity: Up with assist until independent. No excessive bending or twisting. No lifting >10 lbs x 6 weeks.    Weight bearing status: 50% weight bearing on RIGHT leg x 4 weeks with crutches/ walker.  Pain management:  PO as tolerated. No NSAIDs for 3 months   Antibiotics: Ancef [id number]  Diet: Begin with clear fluids and progress diet as tolerated.   DVT prophylaxis: SCDs only. No chemical DVT ppx needed.  Dressings: Keep Primapore & tegaderm dressings c/d/i x 48 hours.  Physical Therapy/Occupational Therapy   Consults: hospitalist.  Follow-up: Clinic with Dr. Urbina in 6 weeks with repeat x-rays.   Disposition: Pending progress with therapies, pain control on orals, and medical  stability, anticipate discharge to home today    Future Appointments   Date Time Provider Department Center   8/12/2021  7:30 AM Milton Medina, PT [facility] [place]   8/12/2021 10:30 AM Lory Tapia OT [facility] [place]   8/12/2021  1:30 PM Milton Medina, PT [facility]   9/21/2021 10:30 AM Mac Urbina MD [facility]       Patient discussed with Dr. Urbina.    Tracy Jurado MD  Orthopaedic Surgery, PGY-4  [phone]    Please page me directly with any questions/concerns during regular weekday hours before 5pm. If there is no response, if it is a weekend, or if it is during evening hours then please page the orthopaedic surgery resident on call.

## 2021-08-12 NOTE — PROGRESS NOTES
[facility]    Medicine Progress Note - Hospitalist Service       Date of Admission:  8/11/2021    Assessment & Plan           Kristal Lester is a 61 year old female admitted on 8/11/2021 s/p following procedure:     Pre op diagnosis:  SI (sacroiliac) joint dysfunction         Revision right sacroiliac joint fusion with iliosacral screws and open posterior joint debridement and grafting, use of O-Arm/stealth navigation, allograft, local autograft, and bone morphogenic  By Dr Urbina.   EBL: ~50 ml.   No complication reported.      Currently doing well.         # Orthopedic Surgery:      Post Op Management per Primary team.   Hemodynamics: stable currently.   Off ivf.    Post op 24 hr capnography per protocol: stable.   Pain control- per Primary team and/or Pain team.    Minimize use of narcotics as able.   Consider bowel regimen with narcotics.   Encourage Incentive spirometry to prevent atelectasis.  DVT prophylaxis- per Primary team. SCDs only. No chemical DVT ppx needed.  Activity, antibiotics, wound and/or drain care - per Primary team. Ancef x 24hours.     Anemia of acute blood loss: Pre op Hgb 13.6.   Monitor hgb for anemia of acute blood loss. Transfuse for hgb <7.0  8/12: 12.7     #Cardiovascular:   No significant history. Currently doing well, stable vitals.  Stress test 2017: no ischemia  Holter 2017: Sinus rhythm with PVCs. No evidence for sustained arrhythmias.  Continue to monitor.     #Pulmonary:  No concern.     #Hot flashes:  Continue PTA hormone therapy     #Insomnia:  Continue PTA lorazepam at bedtime     No other acute medical concern.          Rest per primary.   Thank you for the consultation. Please page with any question. Hospitalist team to follow.          The patient's care was discussed with the Bedside Nurse, Care Coordinator/, Patient and Primary team.    Speedy Felix MD  Hospitalist Service  [facility]  [facility]  Securely message with the [employer] Console (learn more here)  Text page via [employer] Paging/Directory      ______________________________________________________________________    Interval History     Interval events reviewed.   States doing well  Denies fever or chills.   No cough or cp or sob.   No LH or dizziness.   No NV or pain abdomen.   No new sensory or motor complaint.   No new rash.    No other new or acute medical concern      Data reviewed today: I reviewed all medications, new labs and imaging results over the last 24 hours. I personally reviewed no images or EKG's today.    Physical Exam   Vital Signs: Temp: (!) 95.6  F (35.3  C) Temp src: Axillary BP: 107/47 Pulse: 61   Resp: 16 SpO2: 97 % O2 Device: None (Room air)    Weight: 171 lbs 1.23 oz    General Appearance: Awake, interactive, NAD  Respiratory: Normal work of breathing. RA   Cardiovascular: RRR  GI: Soft. NT. ND.  Extremities: Distally wwp. No pedal edema  Skin: No acute rash on exposed areas.   Other: A0 x 4. Grossly non focal.     Data   Recent Labs   Lab 08/12/21  0705 08/12/21  0647 08/11/21  0620   HGB 12.7  --   --    GLC  --  122* 96     Recent Results (from the past 24 hour(s))   XR Pelvis G/E 3 Views    Narrative    AP views pelvis radiograph(s) 8/12/2021 11:41 AM    History: s/p revision right SI fusion    Comparison: Radiographs dated 6/5/2020    Findings:    AP view(s) of the pelvis were obtained.     No acute osseous abnormality. Postsurgical changes of SI joint fusion  with additional 2 screws, when compared to the prior examination.  Stable appearing bars of the right SI joint, long screw traversing  bilateral SI joints and short screw traversing the right SI joint.    No substantial degenerative change of hips.    Sacrum and innominate bones are partially obscured by overlying bowel  gas/fecal content.    Pelvic phlebolith.      Impression    Impression:  1. Postsurgical changes of SI joint  instrumentation as described.  2. No substantial degenerative change.    JUTTA ELLERMANN, MD         SYSTEM ID:  [id number]     Medications       acetaminophen  975 mg Oral Q8H     escitalopram  10 mg Oral QAM     famotidine  20 mg Oral BID    Or     famotidine  20 mg Intravenous BID     LORazepam  0.5 mg Oral At Bedtime     polyethylene glycol  17 g Oral Daily     progesterone  100 mg Oral At Bedtime     rosuvastatin  10 mg Oral At Bedtime     senna-docusate  1 tablet Oral BID     sodium chloride (PF)  3 mL Intracatheter Q8H     vitamin D3  50 mcg Oral QAM

## 2021-08-15 NOTE — DISCHARGE SUMMARY
ORTHOPAEDIC SURGERY DISCHARGE SUMMARY     Date of Admission: 8/11/2021  Date of Discharge: 8/12/2021  2:18 PM  Disposition: Home  Staff Physician: No att. providers found  Primary Care Provider: Logan Dias    DISCHARGE DIAGNOSIS:  SI (sacroiliac) joint dysfunction [M53.3]    PROCEDURES: Procedure(s):  Revision right sacroiliac joint fusion with iliosacral screws and open posterior joint debridement and grafting, use of O-Arm/stealth navigation, allograft, local autograft, and bone morphogenic protein on 8/11/2021 with Dr. Urbina    BRIEF HISTORY:  Kristal Lester is a 61 year old female who elected surgical treatment, and understood the indications for this surgery, as well as its risks, benefits, and alternatives as documented in the pre-operative H&P.  Specifically, we reviewed the risks and benefits of the surgery in detail. The risks include, but are not limited to, the general risks associated with anesthesia, including death, pulmonary embolism, DVT, stroke, myocardial infarction, pneumonia, and urinary tract infection. Additional risks specific to the surgery include the risk of infection, dural tear with resultant CSF leak which might necessitate placement of a drain or revision surgery or could result in headaches, nerve injury resulting in weakness or paralysis, risk of adjacent segment disease, the risks of vascular injury, need for revision surgery in the future due to one of the above issues, or risk of incomplete symptom relief. Kristal Lester understands the risks of the surgery and wishes to proceed.  No Guarantees were given.       HOSPITAL COURSE:    The patient was admitted following the above listed procedures for pain control and rehabilitation. Kristal Lester did well post-operatively. Medicine was consulted post operatively to aid in management of medical co-morbidities. The patient received routine nursing cares and at the time of discharge was medically stable. Vital signs  were stable throughout admission. The patient is tolerating a regular diet and is voiding spontaneously. All PT/OT goals have been met for safe mobility. Pain is now controlled on oral medications which will be available on discharge. Stool softeners have been used while taking pain medications to help prevent constipation. Kristal Lester is deemed medically safe to discharge.     Antibiotics:  Ancef given periop and 24 hours postop.   PT Progress:  Has met PT/OT goals for safe mobility.   Pain Meds:  Weaned off all IV pain meds by discharge.  Inpatient Events: No significant events or complications.     PHYSICAL EXAM:    Gen: No acute distress, resting comfortably in bed.  Resp: Non-labored breathing  CV: wwp  MSK:  Spine:  - Dressings c/d/i  - Feet wwp bilaterally     Lumbar Spine:              Motor -               L2-3: Hip flexion R 5/5  And L 5/5 strength               L3/4:  Knee extension R 5/5 and L 5/5 strength              L4/5:  Foot dorsiflexion R 5/5 L 5/5 and                           EHL dorsiflexion R 5/5 L 5/5 strength              S1:  Plantarflexion/Peroneal Muscles  R 5/5 and L 5/5 strength              Sensation: intact to light touch L3-S1 distribution BLE    FOLLOWUP:    Follow up with Dr. Urbina as scheduled on 9/21/21.    Future Appointments   Date Time Provider Department Center   9/21/2021 10:30 AM Mac Urbina MD [facility]       Orthopaedic Surgery appointments are at the [facility] ([address] [place] [address]). Call [phone] to schedule a follow-up appointment at this location with your provider.     PLANNED DISCHARGE ORDERS:     DVT Prophylaxis: Ambulation     Activity: Up with assist until independent. No excessive bending or twisting. No lifting >10 lbs x 6 weeks.  50% weight bearing on RIGHT leg x 4 weeks with crutches/ walker.     Wound Care: see Below      Discharge Medication List as of 8/12/2021 10:06 AM      START  taking these medications    Details   acetaminophen (TYLENOL) 325 MG tablet Take 2 tablets (650 mg) by mouth every 4 hours as needed for other (mild pain), Disp-100 tablet, R-0, E-Prescribe      polyethylene glycol (MIRALAX) 17 g packet Take 17 g by mouth daily, Disp-7 packet, R-0, E-Prescribe      senna-docusate (SENOKOT-S/PERICOLACE) 8.6-50 MG tablet Take 1-2 tablets by mouth 2 times daily Take while on oral narcotics to prevent or treat constipation., Disp-30 tablet, R-0, E-PrescribeWhile taking narcotics      oxyCODONE (ROXICODONE) 5 MG tablet Take 1-2 tablets (5-10 mg) by mouth every 4 hours as needed for pain (Moderate to Severe), Disp-30 tablet, R-0, E-Prescribe         CONTINUE these medications which have NOT CHANGED    Details   diphenhydrAMINE-APAP, sleep, (TYLENOL PM EXTRA STRENGTH PO) Take by mouth daily as needed , Historical      escitalopram (LEXAPRO) 10 MG tablet Take 10 mg by mouth every morning , Historical      estradiol (VIVELLE-DOT) 0.05 MG/24HR bi-weekly patch Place 1 patch onto the skin twice a week , Historical      LORazepam (ATIVAN) 0.5 MG tablet Take 0.5 mg by mouth At Bedtime , Historical      progesterone (PROMETRIUM) 100 MG capsule Take 100 mg by mouth At Bedtime , Historical      rosuvastatin (CRESTOR) 10 MG tablet Take 10 mg by mouth At Bedtime, Historical      Calcium 200 MG TABS Take by mouth every morning , Historical      cyclobenzaprine (FLEXERIL) 5 MG tablet Take 1-2 tablets (5-10 mg) by mouth nightly as needed for muscle spasms, Disp-45 tablet, R-0, E-Prescribe      EPINEPHrine (EPIPEN/ADRENACLICK/OR ANY BX GENERIC EQUIV) 0.3 MG/0.3ML injection 2-pack Inject 0.3 mg into the muscle as needed for anaphylaxis, Historical      order for DME SI-loc beltDisp-1 Units,R-0, Local Print      vitamin D3 (CHOLECALCIFEROL) 2000 units (50 mcg) tablet Take 1 tablet by mouth every morning , Historical         STOP taking these medications       diclofenac (VOLTAREN) 75 MG EC tablet Comments:  "  Reason for Stopping:         oxyCODONE-acetaminophen (PERCOCET) 5-325 MG tablet Comments:   Reason for Stopping:                 Discharge Procedure Orders   Reason for your hospital stay   Order Comments: You were admitted following your spine surgery.     When to call - Contact Surgeon Team   Order Comments: You may experience symptoms that require follow-up before your scheduled appointment. Refer to the \"Stoplight Tool\" for instructions on when to contact your Surgeon Team if you are concerned about pain control, blood clots, constipation, or if you are unable to urinate.     When to call - Reach out to Urgent Care   Order Comments: If you are not able to reach your Surgeon Team and you need immediate care, go to the Orthopedic Walk-in Clinic or Urgent Care at your Surgeon's office.  Do NOT go to the Emergency Room unless you have shortness of breath, chest pain, or other signs of a medical emergency.     When to call - Reasons to Call 911   Order Comments: Call 911 immediately if you experience sudden-onset chest pain, arm weakness/numbness, slurred speech, or shortness of breath     Discharge Instruction - Breathing exercises   Order Comments: Perform breathing exercises using your Incentive Spirometer 10 times per hour while awake for 2 weeks.     Symptoms - Fever Management   Order Comments: A low grade fever can be expected after surgery.  Use acetaminophen (TYLENOL) as needed for fever management.  Contact your Surgeon Team if you have a fever greater than 101.5 F, chills, and/or night sweats.     Symptoms - Constipation management   Order Comments: Constipation (hard, dry bowel movements) is expected after surgery due to the combination of being less active, the anesthetic, and the opioid pain medication.  You can do the following to help reduce constipation:  ~  FLUIDS:  Drink clear liquids (water or Gatorade), or juice (apple/prune).  ~  DIET:  Eat a fiber rich diet.    ~  ACTIVITY:  Get up and move " around several times a day.  Increase your activity as you are able.  MEDICATIONS:  Reduce the risk of constipation by starting medications before you are constipated.  You can take Miralax   (1 packet as directed) and/or a stool softener (Senokot 1-2 tablets 1-2 times a day).  If you already have constipation and these medications are not working, you can get magnesium citrate and use as directed.  If you continue to have constipation you can try an over the counter suppository or enema.  Call your Surgeon Team if it has been greater than 3 days since your last bowel movement.     Symptoms - Reduced Urine Output   Order Comments: Changes in the amount of fluids you drank before and after surgery may result in problems urinating.  It is important to stay well-hydrated after surgery and drink plenty of water. If it has been greater than 8 hours since you have urinated despite drinking plenty of water, call your Surgeon Team.     Comfort and Pain Management - Pain after Surgery   Order Comments: Pain after surgery is normal and expected.  You will have some amount of pain for several weeks after surgery.  Your pain will improve with time.  There are several things you can do to help reduce your pain including: rest, ice, elevation, and using pain medications as needed. Contact your Surgeon Team if you have pain that persists or worsens after surgery despite rest, ice, elevation, and taking your medication(s) as prescribed. Contact your Surgeon Team if you have new numbness, tingling, or weakness in your operative extremity.     Comfort and Pain Management - Swelling after Surgery   Order Comments: Swelling and/or bruising of the surgical extremity is common and may persist for several months after surgery. In addition to frequent icing and elevation, gentle compressive support with an ACE wrap or tubigrip may help with swelling. Apply compression regularly, removing at least twice daily to perform skin checks. Contact  your Surgeon Team if your swelling increases and is NOT associated with an increase in your activity level, or if your swelling increases and is associated with redness and pain.     Comfort and Pain Management - Cold therapy   Order Comments: Ice can be used to control swelling and discomfort after surgery. Place a thin towel over your operative site and apply the ice pack overtop. Leave ice pack in place for 20 minutes, then remove for 20 minutes. Repeat this 20 minutes on/20 minutes off routine as often as tolerated.     Medication Instructions - Acetaminophen (TYLENOL) Instructions   Order Comments: You were discharged with acetaminophen (TYLENOL) for pain management after surgery. Acetaminophen most effectively manages pain symptoms when it is taken on a schedule without missing doses (every four, six, or eight hours). Your Provider will prescribe a safe daily dose between 3000 - 4000 mg.  Do NOT exceed this daily dose. Most patients use acetaminophen for pain control for the first four weeks after surgery.  You can wean from this medication as your pain decreases.     Medication Instructions - Opioids - Tapering Instructions   Order Comments: In the first three days following surgery, your symptoms may warrant use of the narcotic pain medication every four to six hours as prescribed. This is normal. As your pain symptoms improve, focus your efforts on decreasing (tapering) use of narcotic medications. The most successful tapering strategy is to first, decrease the number of tablets you take every 4-6 hours to the minimum prescribed. Then, increase the amount of time between doses.  For example:  First, taper to   or 1 tablet every 4-6 hours.  Then, taper to   or 1 tablet every 6-8 hours.  Then, taper to   or 1 tablet every 8-10 hours.  Then, taper to   or 1 tablet every 10-12 hours.  Then, taper to   or 1 tablet at bedtime.  The bedtime dose can help with comfort during sleep and is typically the last dose to  be discontinued after surgery.     Follow Up Care   Order Comments: Follow-up with your Surgeon Team in 6 weeks     Medication Instructions - Opioid Instructions (1 - 2 tablets Q 4-6 hours, MAX 6 tablets)   Order Comments: You were discharged with an opioid medication (hydromorphone, oxycodone, hydrocodone, or tramadol). This medication should only be taken for breakthrough pain that is not controlled with acetaminophen (TYLENOL). If you rate your pain less than 3 you do not need this medication.  Pain rating 0-3:  You do not need this medication.  Pain rating 4-6:  Take 1 tablet every 4-6 hours as needed  Pain rating 7-10:  Take 2 tablets every 4-6 hours as needed.  Do not exceed 6 tablets per day     Activity   Order Comments: 50 % WB right leg X4 weeks with crutch/walker. No lifting >1- lbs, no bending or twisting.     Order Specific Question Answer Comments   Is discharge order? Yes      Discharge Instruction - Regular Diet Adult   Order Comments: Return to your pre-surgery diet unless instructed otherwise     Order Specific Question Answer Comments   Is discharge order? Yes      Tracy Jurado MD  Orthopaedic Surgery Resident, PGY4  Pager: [phone]

## 2021-08-19 ENCOUNTER — PRE VISIT (OUTPATIENT)
Dept: SURGERY | Facility: CLINIC | Age: 61
End: 2021-08-19

## 2021-08-26 ENCOUNTER — DOCUMENTATION ONLY (OUTPATIENT)
Dept: OTHER | Facility: CLINIC | Age: 61
End: 2021-08-26

## 2021-09-13 DIAGNOSIS — M53.3 SI (SACROILIAC) JOINT DYSFUNCTION: Primary | ICD-10-CM

## 2021-09-15 ASSESSMENT — ENCOUNTER SYMPTOMS
SPEECH CHANGE: 0
CONSTIPATION: 0
WHEEZING: 0
SEIZURES: 0
STIFFNESS: 1
NUMBNESS: 0
INCREASED ENERGY: 0
FATIGUE: 0
HALLUCINATIONS: 0
BOWEL INCONTINENCE: 0
HEADACHES: 0
BACK PAIN: 1
VOMITING: 0
DIZZINESS: 1
WEIGHT LOSS: 0
COUGH: 1
LOSS OF CONSCIOUSNESS: 0
COUGH DISTURBING SLEEP: 0
JAUNDICE: 0
POOR WOUND HEALING: 0
NIGHT SWEATS: 1
DISTURBANCES IN COORDINATION: 1
BLOOD IN STOOL: 0
SHORTNESS OF BREATH: 0
NAUSEA: 1
ALTERED TEMPERATURE REGULATION: 0
SKIN CHANGES: 0
POLYPHAGIA: 0
CHILLS: 0
DECREASED APPETITE: 0
WEAKNESS: 1
SNORES LOUDLY: 1
TINGLING: 0
SPUTUM PRODUCTION: 1
NAIL CHANGES: 0
MYALGIAS: 1
DIARRHEA: 0
MUSCLE CRAMPS: 0
POSTURAL DYSPNEA: 0
HEMOPTYSIS: 0
ARTHRALGIAS: 1
JOINT SWELLING: 0
BLOATING: 1
MUSCLE WEAKNESS: 1
RECTAL PAIN: 0
MEMORY LOSS: 0
FEVER: 0
WEIGHT GAIN: 1
DYSPNEA ON EXERTION: 0
HEARTBURN: 0
PARALYSIS: 0
NECK PAIN: 1
POLYDIPSIA: 0
ABDOMINAL PAIN: 1
TREMORS: 0

## 2021-09-21 ENCOUNTER — ANCILLARY PROCEDURE (OUTPATIENT)
Dept: GENERAL RADIOLOGY | Facility: CLINIC | Age: 61
End: 2021-09-21
Attending: ORTHOPAEDIC SURGERY
Payer: COMMERCIAL

## 2021-09-21 ENCOUNTER — OFFICE VISIT (OUTPATIENT)
Dept: ORTHOPEDICS | Facility: CLINIC | Age: 61
End: 2021-09-21
Payer: COMMERCIAL

## 2021-09-21 VITALS — BODY MASS INDEX: 27.82 KG/M2 | WEIGHT: 167 LBS | HEIGHT: 65 IN

## 2021-09-21 DIAGNOSIS — M53.3 SI (SACROILIAC) JOINT DYSFUNCTION: ICD-10-CM

## 2021-09-21 DIAGNOSIS — M53.3 SI (SACROILIAC) JOINT DYSFUNCTION: Primary | ICD-10-CM

## 2021-09-21 PROCEDURE — 72170 X-RAY EXAM OF PELVIS: CPT | Mod: GC | Performed by: RADIOLOGY

## 2021-09-21 PROCEDURE — 99024 POSTOP FOLLOW-UP VISIT: CPT | Performed by: ORTHOPAEDIC SURGERY

## 2021-09-21 ASSESSMENT — MIFFLIN-ST. JEOR: SCORE: 1323.39

## 2021-09-21 NOTE — LETTER
2021         RE: Kristal Lester  [address]  [facility] [address]        Dear Colleague,    Thank you for referring your patient, Kristal Lester, to the [facility] [place]. Please see a copy of my visit note below.    Spine Surgery Return Clinic Visit      Chief Complaint:   RECHECK (6 week POP revision Right SI fusion DOS 21 has been having issues with an infection and also is having pain when she sits for a periodf of time and stands up is at 9/10 for pain. )      Interval HPI:  Symptom Profile Including: location of symptoms, onset, severity, exacerbating/alleviating factors, previous treatments:        Kristal Lester is a 61 year old female who presents for 6 week follow-up s/p right SI fusion revision on 21 with Dr. Urbina. She reports she continues to have pain especially with bending at the waist and with spine extension.  She is concerned about her incision located above the gluteal cleft being swollen. Otherwise she has been managing her pain with tylenol and has been increasing her activity level. She has questions as to how long her recovery will take which we discussed it may take from 6-12 months to recover.            Past Medical History:     Past Medical History:   Diagnosis Date     Benign paroxysmal positional vertigo, left 7/10/2019     HLD (hyperlipidemia)      PVC's (premature ventricular contractions)      SI (sacroiliac) joint dysfunction             Past Surgical History:     Past Surgical History:   Procedure Laterality Date     Bunion surgery  2017      SECTION  5 years ago, 35 years ago     ELBOW SURGERY Right      MAMMOPLASTY AUGMENTATION  23 years ago     OPTICAL TRACKING SYSTEM FUSION REVISION SACRAL ILIAC N/A 2021    Procedure: Revision right sacroiliac joint fusion with iliosacral screws and open posterior joint debridement and grafting, use of O-Arm/stealth navigation, allograft, local  autograft, and bone morphogenic;  Surgeon: Mac Urbnia MD;  Location: [facility] OR     OPTICAL TRACKING SYSTEM FUSION SACRAL ILIAC Right 8/22/2019    Procedure: Right Minimal Invasive Sacral Iliac Joint Fusion;  Surgeon: Mac Urbina MD;  Location: [facility] OR            Social History:     Social History     Tobacco Use     Smoking status: Never Smoker     Smokeless tobacco: Never Used   Substance Use Topics     Alcohol use: Not Currently            Family History:     Family History   Problem Relation Age of Onset     Chronic Obstructive Pulmonary Disease Father      Heart Disease Father      Hyperlipidemia Father      Scleroderma Mother      Mental Illness Brother      Alzheimer Disease Maternal Grandmother      Parkinsonism Maternal Grandfather      Anesthesia Reaction No family hx of      Deep Vein Thrombosis (DVT) No family hx of             Allergies:     Allergies   Allergen Reactions     Bees Shortness Of Breath     Cellulitis of the leg            Medications:     Current Outpatient Medications   Medication     acetaminophen (TYLENOL) 325 MG tablet     Calcium 200 MG TABS     cyclobenzaprine (FLEXERIL) 5 MG tablet     diphenhydrAMINE-APAP, sleep, (TYLENOL PM EXTRA STRENGTH PO)     EPINEPHrine (EPIPEN/ADRENACLICK/OR ANY BX GENERIC EQUIV) 0.3 MG/0.3ML injection 2-pack     escitalopram (LEXAPRO) 10 MG tablet     estradiol (VIVELLE-DOT) 0.05 MG/24HR bi-weekly patch     LORazepam (ATIVAN) 0.5 MG tablet     order for DME     oxyCODONE (ROXICODONE) 5 MG tablet     polyethylene glycol (MIRALAX) 17 g packet     progesterone (PROMETRIUM) 100 MG capsule     rosuvastatin (CRESTOR) 10 MG tablet     senna-docusate (SENOKOT-S/PERICOLACE) 8.6-50 MG tablet     vitamin D3 (CHOLECALCIFEROL) 2000 units (50 mcg) tablet     No current facility-administered medications for this visit.             Review of Systems:   A focused musculoskeletal and neurologic ROS was performed with pertinent positives and  "negatives noted in the HPI.  Additional systems were also reviewed and are documented at the bottom of the note.         Physical Exam:   Vitals: Ht 1.651 m (5' 5\")   Wt 75.8 kg (167 lb)   BMI 27.79 kg/m    Musculoskeletal, Neurologic, and Spine:   Gait: Non-antalgic gait without assistive device  Incision: Well healed, no discharge, erythema or rash, no swelling noted or fluctuance appreciated.         Imaging:   We ordered and independently reviewed new radiographs at this clinic visit. The results were discussed with the patient. XR pelvis demonstrates no interval change of the screws       Assessment and Plan:     61 year old female now 6 weeks s/p right SI joint fusion 8/13/21 with Dr. Urbina who is continuing to have pain of the lower back, however pain is controlled with tylenol. We discussed ongoing recovery and length lasting 6-12 months. Additionally we discussed initiation of PT for strengthening to which patient was amenable. Will plan to have patient follow-up in 3 months with repeat AP/lat pelvis XR with a phone visit in the interim if needed or condition acutely worsens.     Plan:  - Start PT  - Follow-up in 3 months with repeat AP/lat pelvis XR   - May schedule phone visit if issues arise prior to 3 month f/u    Jd Germain MD   Orthopaedic Surgery Resident   Pager [phone]    Patient was seen and discussed with Dr. Urbina who is in agreement with the assessment and plan.    Respectfully,  Mac Urbina MD  Spine Surgery  [facility]    Attending MD (Dr. Mac Urbina) :  I reviewed and verified the history and physical exam of the patient and discussed the patient's management with the other clinical providers involved in this patient's care including any involved residents or physicians assistants. I reviewed the above note and agree with the documented findings and plan of care, which were communicated to the patient.      Mac Urbina MD      "

## 2021-09-21 NOTE — NURSING NOTE
"Reason For Visit:   Chief Complaint   Patient presents with     RECHECK     6 week POP revision Right SI fusion DOS 8/13/21 has been having issues with an infection and also is having pain when she sits for a periodf of time and stands up is at 9/10 for pain.        Primary MD: Logan Dias  Ref. MD: Jules     ?  No  Date of surgery: Dr. Urbina   Type of surgery: Dr. Urbina .  Smoker: No  Request smoking cessation information: No    Ht 1.651 m (5' 5\")   Wt 75.8 kg (167 lb)   BMI 27.79 kg/m           Oswestry (NIKHIL) Questionnaire    OSWESTRY DISABILITY INDEX 9/15/2021   Count 9   Sum 13   Oswestry Score (%) 28.89            Neck Disability Index (NDI) Questionnaire    No flowsheet data found.                Promis 10 Assessment    PROMIS 10 9/15/2021   In general, would you say your health is: Good   In general, would you say your quality of life is: Good   In general, how would you rate your physical health? Good   In general, how would you rate your mental health, including your mood and your ability to think? Very good   In general, how would you rate your satisfaction with your social activities and relationships? Fair   In general, please rate how well you carry out your usual social activities and roles Good   To what extent are you able to carry out your everyday physical activities such as walking, climbing stairs, carrying groceries, or moving a chair? A little   How often have you been bothered by emotional problems such as feeling anxious, depressed or irritable? Rarely   How would you rate your fatigue on average? Mild   How would you rate your pain on average?   0 = No Pain  to  10 = Worst Imaginable Pain 6   In general, would you say your health is: 3   In general, would you say your quality of life is: 3   In general, how would you rate your physical health? 3   In general, how would you rate your mental health, including your mood and your ability to think? 4   In general, how would " you rate your satisfaction with your social activities and relationships? 2   In general, please rate how well you carry out your usual social activities and roles. (This includes activities at home, at work and in your community, and responsibilities as a parent, child, spouse, employee, friend, etc.) 3   To what extent are you able to carry out your everyday physical activities such as walking, climbing stairs, carrying groceries, or moving a chair? 2   In the past 7 days, how often have you been bothered by emotional problems such as feeling anxious, depressed, or irritable? 2   In the past 7 days, how would you rate your fatigue on average? 2   In the past 7 days, how would you rate your pain on average, where 0 means no pain, and 10 means worst imaginable pain? 6   Global Mental Health Score 13   Global Physical Health Score 12   PROMIS TOTAL - SUBSCORES 25   Some recent data might be hidden                Tae Canseco ATC

## 2021-09-21 NOTE — PROGRESS NOTES
Spine Surgery Return Clinic Visit      Chief Complaint:   RECHECK (6 week POP revision Right SI fusion DOS 21 has been having issues with an infection and also is having pain when she sits for a periodf of time and stands up is at 9/10 for pain. )      Interval HPI:  Symptom Profile Including: location of symptoms, onset, severity, exacerbating/alleviating factors, previous treatments:        Kristal Lester is a 61 year old female who presents for 6 week follow-up s/p right SI fusion revision on 21 with Dr. Urbina. She reports she continues to have pain especially with bending at the waist and with spine extension.  She is concerned about her incision located above the gluteal cleft being swollen. Otherwise she has been managing her pain with tylenol and has been increasing her activity level. She has questions as to how long her recovery will take which we discussed it may take from 6-12 months to recover.            Past Medical History:     Past Medical History:   Diagnosis Date     Benign paroxysmal positional vertigo, left 7/10/2019     HLD (hyperlipidemia)      PVC's (premature ventricular contractions)      SI (sacroiliac) joint dysfunction             Past Surgical History:     Past Surgical History:   Procedure Laterality Date     Bunion surgery  2017      SECTION  5 years ago, 35 years ago     ELBOW SURGERY Right      MAMMOPLASTY AUGMENTATION  23 years ago     OPTICAL TRACKING SYSTEM FUSION REVISION SACRAL ILIAC N/A 2021    Procedure: Revision right sacroiliac joint fusion with iliosacral screws and open posterior joint debridement and grafting, use of O-Arm/stealth navigation, allograft, local autograft, and bone morphogenic;  Surgeon: Mac Urbina MD;  Location: [facility]     OPTICAL TRACKING SYSTEM FUSION SACRAL ILIAC Right 2019    Procedure: Right Minimal Invasive Sacral Iliac Joint Fusion;  Surgeon: Mac Urbina MD;  Location: [facility]             "Social History:     Social History     Tobacco Use     Smoking status: Never Smoker     Smokeless tobacco: Never Used   Substance Use Topics     Alcohol use: Not Currently            Family History:     Family History   Problem Relation Age of Onset     Chronic Obstructive Pulmonary Disease Father      Heart Disease Father      Hyperlipidemia Father      Scleroderma Mother      Mental Illness Brother      Alzheimer Disease Maternal Grandmother      Parkinsonism Maternal Grandfather      Anesthesia Reaction No family hx of      Deep Vein Thrombosis (DVT) No family hx of             Allergies:     Allergies   Allergen Reactions     Bees Shortness Of Breath     Cellulitis of the leg            Medications:     Current Outpatient Medications   Medication     acetaminophen (TYLENOL) 325 MG tablet     Calcium 200 MG TABS     cyclobenzaprine (FLEXERIL) 5 MG tablet     diphenhydrAMINE-APAP, sleep, (TYLENOL PM EXTRA STRENGTH PO)     EPINEPHrine (EPIPEN/ADRENACLICK/OR ANY BX GENERIC EQUIV) 0.3 MG/0.3ML injection 2-pack     escitalopram (LEXAPRO) 10 MG tablet     estradiol (VIVELLE-DOT) 0.05 MG/24HR bi-weekly patch     LORazepam (ATIVAN) 0.5 MG tablet     order for DME     oxyCODONE (ROXICODONE) 5 MG tablet     polyethylene glycol (MIRALAX) 17 g packet     progesterone (PROMETRIUM) 100 MG capsule     rosuvastatin (CRESTOR) 10 MG tablet     senna-docusate (SENOKOT-S/PERICOLACE) 8.6-50 MG tablet     vitamin D3 (CHOLECALCIFEROL) 2000 units (50 mcg) tablet     No current facility-administered medications for this visit.             Review of Systems:   A focused musculoskeletal and neurologic ROS was performed with pertinent positives and negatives noted in the HPI.  Additional systems were also reviewed and are documented at the bottom of the note.         Physical Exam:   Vitals: Ht 1.651 m (5' 5\")   Wt 75.8 kg (167 lb)   BMI 27.79 kg/m    Musculoskeletal, Neurologic, and Spine:   Gait: Non-antalgic gait without assistive " device  Incision: Well healed, no discharge, erythema or rash, no swelling noted or fluctuance appreciated.         Imaging:   We ordered and independently reviewed new radiographs at this clinic visit. The results were discussed with the patient. XR pelvis demonstrates no interval change of the screws       Assessment and Plan:     61 year old female now 6 weeks s/p right SI joint fusion 8/13/21 with Dr. Urbina who is continuing to have pain of the lower back, however pain is controlled with tylenol. We discussed ongoing recovery and length lasting 6-12 months. Additionally we discussed initiation of PT for strengthening to which patient was amenable. Will plan to have patient follow-up in 3 months with repeat AP/lat pelvis XR with a phone visit in the interim if needed or condition acutely worsens.     Plan:  - Start PT  - Follow-up in 3 months with repeat AP/lat pelvis XR   - May schedule phone visit if issues arise prior to 3 month f/u    Jd Germain MD   Orthopaedic Surgery Resident   Pager [phone]    Patient was seen and discussed with Dr. Urbina who is in agreement with the assessment and plan.    Respectfully,  Mac Urbina MD  Spine Surgery  [facility]    Attending MD (Dr. Mac Urbina) :  I reviewed and verified the history and physical exam of the patient and discussed the patient's management with the other clinical providers involved in this patient's care including any involved residents or physicians assistants. I reviewed the above note and agree with the documented findings and plan of care, which were communicated to the patient.      Mac Urbina MD

## 2021-09-27 DIAGNOSIS — M53.3 SI (SACROILIAC) JOINT DYSFUNCTION: Primary | ICD-10-CM

## 2021-09-27 DIAGNOSIS — M53.3 CHRONIC SI JOINT PAIN: ICD-10-CM

## 2021-09-27 DIAGNOSIS — G89.29 CHRONIC SI JOINT PAIN: ICD-10-CM

## 2021-10-11 ENCOUNTER — THERAPY VISIT (OUTPATIENT)
Dept: PHYSICAL THERAPY | Facility: CLINIC | Age: 61
End: 2021-10-11
Attending: ORTHOPAEDIC SURGERY
Payer: COMMERCIAL

## 2021-10-11 DIAGNOSIS — M53.3 CHRONIC SI JOINT PAIN: ICD-10-CM

## 2021-10-11 DIAGNOSIS — M53.3 SI (SACROILIAC) JOINT DYSFUNCTION: ICD-10-CM

## 2021-10-11 DIAGNOSIS — G89.29 CHRONIC SI JOINT PAIN: ICD-10-CM

## 2021-10-11 PROCEDURE — 97161 PT EVAL LOW COMPLEX 20 MIN: CPT | Mod: GP | Performed by: PHYSICAL THERAPIST

## 2021-10-11 PROCEDURE — 97112 NEUROMUSCULAR REEDUCATION: CPT | Mod: GP | Performed by: PHYSICAL THERAPIST

## 2021-10-11 PROCEDURE — 97110 THERAPEUTIC EXERCISES: CPT | Mod: GP | Performed by: PHYSICAL THERAPIST

## 2021-10-11 NOTE — PROGRESS NOTES
Patient seen for one time evaluation and treatment.  Patient did not return for further treatment and current status is unknown.  Please see initial evaluation for further information.        Physical Therapy Initial Evaluation  Subjective:    Patient Health History  Kristal Lester being seen for Pt for si surgery in august.     Problem began: 8/11/2021.   Problem occurred: Fell in frozen driveway   Pain is reported as 5/10 on pain scale.  General health as reported by patient is good.  Pertinent medical history includes: menopausal.   Red flags:  None as reported by patient.  Medical allergies: none.   Surgeries include:  Orthopedic surgery.    Current medications:  Anti-depressants.    Current occupation is .   Primary job tasks include:  Computer work.                  Therapist Generated HPI Evaluation  Problem details: Pt presents to clinic S/P revision R SI joint fusion on 8/11/2021. Pt originally injured SI joint in 2017 after slipping on driveway. Pt underwent R SI joint fusion in 2019, however continued to have pain. Pt currently having most pain with prolonged sitting and bending. Pt is also not lifting at this time. .         Type of problem:  Sacroiliac.    This is a new condition.  Condition occurred with:  A wrong landing.  Where condition occurred: in the community.  Patient reports pain:  SI joint left, SI joint right and lower lumbar spine.  Pain is described as sharp and aching and is intermittent.  Pain radiates to:  No radiation. Pain is worse during the night.  Since onset symptoms are gradually improving.  Associated symptoms:  Loss of motion/stiffness. Symptoms are exacerbated by bending, sitting, certain positions, lying down and lifting  and relieved by rest and ice.  Special tests included:  MRI.  Past treatment: none. There was none improvement following previous treatment.  Restrictions due to condition include:  Working in normal job without restrictions.  Barriers  include:  None as reported by patient.                        Objective:  System         Lumbar/SI Evaluation  ROM:    AROM Lumbar:   Flexion:            To mid tibia  Ext:                    50%   Side Bend:        Left:  75%    Right:  50% +pain R  Rotation:           Left:  75%    Right:  75%  Side Glide:        Left:     Right:         Strength: glute max 4/5 R 4+/5 L, glute med 4-/5 R 4+/5 L  Lumbar Myotomes:  normal                Lumbar Dermtomes:  normal                  Lumbar Palpation:    Tenderness present at Left:    Erector Spinae  Tenderness not present at Left:    Piriformis or PSIS  Tenderness present at Right: Erector Spinae; Piriformis and PSIS                                                     General     ROS    Assessment/Plan:    Patient is a 61 year old female with R SI joint complaints.    Patient has the following significant findings with corresponding treatment plan.                Diagnosis 1:  S/P revision R SI joint fusion  Pain -  hot/cold therapy, manual therapy, self management, education and home program  Decreased ROM/flexibility - manual therapy, therapeutic exercise, therapeutic activity and home program  Decreased strength - therapeutic exercise, therapeutic activities and home program  Impaired balance - neuro re-education, therapeutic activities and home program  Impaired gait - gait training, assistive devices and home program  Impaired muscle performance - neuro re-education and home program  Decreased function - therapeutic activities and home program  Impaired posture - neuro re-education, therapeutic activities and home program    Therapy Evaluation Codes:   1) History comprised of:   Personal factors that impact the plan of care:      None.    Comorbidity factors that impact the plan of care are:      Menopausal.     Medications impacting care: Anti-depressant.  2) Examination of Body Systems comprised of:   Body structures and functions that impact the plan of care:       Lumbar spine and Sacral illiac joint.   Activity limitations that impact the plan of care are:      Bending, Driving, Lifting, Sitting and Walking.  3) Clinical presentation characteristics are:   Stable/Uncomplicated.  4) Decision-Making    Low complexity using standardized patient assessment instrument and/or measureable assessment of functional outcome.  Cumulative Therapy Evaluation is: Low complexity.    Previous and current functional limitations:  (See Goal Flow Sheet for this information)    Short term and Long term goals: (See Goal Flow Sheet for this information)     Communication ability:  Patient appears to be able to clearly communicate and understand verbal and written communication and follow directions correctly.  Treatment Explanation - The following has been discussed with the patient:   RX ordered/plan of care  Anticipated outcomes  Possible risks and side effects  This patient would benefit from PT intervention to resume normal activities.   Rehab potential is good.    Frequency:  1 X week, once daily  Duration:  for 8 weeks  Discharge Plan:  Achieve all LTG.  Independent in home treatment program.  Return to previous functional level by discharge.  Reach maximal therapeutic benefit.    Please refer to the daily flowsheet for treatment today, total treatment time and time spent performing 1:1 timed codes.

## 2022-01-02 ENCOUNTER — MYC MEDICAL ADVICE (OUTPATIENT)
Dept: ORTHOPEDICS | Facility: CLINIC | Age: 62
End: 2022-01-02
Payer: COMMERCIAL

## 2022-01-03 NOTE — TELEPHONE ENCOUNTER
LOV: 3/16/2021 (DDD, cervical radicular pain) Ordered cervical MRI.    The last appointment for the elbow was 3/1/2021 with normal elbow xr.    Has an appointment on 1/6/2022.     Kenzie Muhammad MA, ATC      I called and spoke with patient, I recommended she see me on 1/6 and we can determine further treatments and imaging on that visit.   Dr Gan

## 2022-01-06 DIAGNOSIS — M53.3 SI (SACROILIAC) JOINT DYSFUNCTION: ICD-10-CM

## 2022-01-06 DIAGNOSIS — M54.16 LUMBAR RADICULOPATHY: Primary | ICD-10-CM

## 2022-01-07 ASSESSMENT — ENCOUNTER SYMPTOMS
NECK PAIN: 0
MUSCLE CRAMPS: 0
MYALGIAS: 0
JOINT SWELLING: 0
BACK PAIN: 1
STIFFNESS: 1
ARTHRALGIAS: 1
MUSCLE WEAKNESS: 0

## 2022-01-11 ENCOUNTER — ANCILLARY PROCEDURE (OUTPATIENT)
Dept: GENERAL RADIOLOGY | Facility: CLINIC | Age: 62
End: 2022-01-11
Attending: ORTHOPAEDIC SURGERY
Payer: COMMERCIAL

## 2022-01-11 ENCOUNTER — OFFICE VISIT (OUTPATIENT)
Dept: ORTHOPEDICS | Facility: CLINIC | Age: 62
End: 2022-01-11
Payer: COMMERCIAL

## 2022-01-11 VITALS — HEIGHT: 65 IN | BODY MASS INDEX: 27.49 KG/M2 | WEIGHT: 165 LBS

## 2022-01-11 DIAGNOSIS — M53.3 SI (SACROILIAC) JOINT DYSFUNCTION: Primary | ICD-10-CM

## 2022-01-11 PROCEDURE — 72170 X-RAY EXAM OF PELVIS: CPT | Mod: GC | Performed by: RADIOLOGY

## 2022-01-11 PROCEDURE — 99213 OFFICE O/P EST LOW 20 MIN: CPT | Mod: GC | Performed by: ORTHOPAEDIC SURGERY

## 2022-01-11 ASSESSMENT — MIFFLIN-ST. JEOR: SCORE: 1314.32

## 2022-01-11 NOTE — NURSING NOTE
"Reason For Visit:   Chief Complaint   Patient presents with     RECHECK     3 month follow up DOS 8/13/21 Revision right sacroiliac joint fusion with iliosacral screws and open posterior joint debridement and grafting, use of O-Arm/stealth navigation, allograft, local autograft, and bone morphogenic       Primary MD: [facility]  Ref. MD: Jules     ?  No  Date of surgery: Dr. Urbina   Type of surgery:  .  Smoker: No  Request smoking cessation information: No    Ht 1.651 m (5' 5\")   Wt 74.8 kg (165 lb)   BMI 27.46 kg/m           Oswestry (NIKHIL) Questionnaire    OSWESTRY DISABILITY INDEX 1/7/2022   Count 9   Sum 11   Oswestry Score (%) 24.44            Neck Disability Index (NDI) Questionnaire    No flowsheet data found.                Promis 10 Assessment    PROMIS 10 9/15/2021   In general, would you say your health is: Good   In general, would you say your quality of life is: Good   In general, how would you rate your physical health? Good   In general, how would you rate your mental health, including your mood and your ability to think? Very good   In general, how would you rate your satisfaction with your social activities and relationships? Fair   In general, please rate how well you carry out your usual social activities and roles Good   To what extent are you able to carry out your everyday physical activities such as walking, climbing stairs, carrying groceries, or moving a chair? A little   How often have you been bothered by emotional problems such as feeling anxious, depressed or irritable? Rarely   How would you rate your fatigue on average? Mild   How would you rate your pain on average?   0 = No Pain  to  10 = Worst Imaginable Pain 6   In general, would you say your health is: 3   In general, would you say your quality of life is: 3   In general, how would you rate your physical health? 3   In general, how would you rate your mental health, including your " mood and your ability to think? 4   In general, how would you rate your satisfaction with your social activities and relationships? 2   In general, please rate how well you carry out your usual social activities and roles. (This includes activities at home, at work and in your community, and responsibilities as a parent, child, spouse, employee, friend, etc.) 3   To what extent are you able to carry out your everyday physical activities such as walking, climbing stairs, carrying groceries, or moving a chair? 2   In the past 7 days, how often have you been bothered by emotional problems such as feeling anxious, depressed, or irritable? 2   In the past 7 days, how would you rate your fatigue on average? 2   In the past 7 days, how would you rate your pain on average, where 0 means no pain, and 10 means worst imaginable pain? 6   Global Mental Health Score 13   Global Physical Health Score 12   PROMIS TOTAL - SUBSCORES 25   Some recent data might be hidden                Tae Canseco ATC

## 2022-01-11 NOTE — PROGRESS NOTES
Spine Surgery Return Clinic Visit      Chief Complaint:   RECHECK (3 month follow up DOS 21 Revision right sacroiliac joint fusion with iliosacral screws and open posterior joint debridement and grafting, use of O-Arm/stealth navigation, allograft, local autograft, and bone morphogenic)      Interval HPI:  Symptom Profile Including: location of symptoms, onset, severity, exacerbating/alleviating factors, previous treatments:        Kristal Lester is a 61 year old female who is 5 months post revision right SI joint fusion.  She reports the surgery has improved her symptoms.  She initiated physical therapy 1 month ago for 1 appointment.  However, she has not been able to return to physical therapy due to being busy with being coming home.  She now complains of some pain with standing up from sitting, and some subjective weakness.              Past Medical History:     Past Medical History:   Diagnosis Date     Benign paroxysmal positional vertigo, left 7/10/2019     HLD (hyperlipidemia)      PVC's (premature ventricular contractions)      SI (sacroiliac) joint dysfunction             Past Surgical History:     Past Surgical History:   Procedure Laterality Date     Bunion surgery  2017      SECTION  5 years ago, 35 years ago     ELBOW SURGERY Right      MAMMOPLASTY AUGMENTATION  23 years ago     OPTICAL TRACKING SYSTEM FUSION REVISION SACRAL ILIAC N/A 2021    Procedure: Revision right sacroiliac joint fusion with iliosacral screws and open posterior joint debridement and grafting, use of O-Arm/stealth navigation, allograft, local autograft, and bone morphogenic;  Surgeon: Mac Urbina MD;  Location: [facility] OR     OPTICAL TRACKING SYSTEM FUSION SACRAL ILIAC Right 2019    Procedure: Right Minimal Invasive Sacral Iliac Joint Fusion;  Surgeon: Mac Urbina MD;  Location: [facility] OR            Social History:     Social History     Tobacco Use     Smoking status: Never Smoker  "    Smokeless tobacco: Never Used   Substance Use Topics     Alcohol use: Not Currently            Family History:     Family History   Problem Relation Age of Onset     Chronic Obstructive Pulmonary Disease Father      Heart Disease Father      Hyperlipidemia Father      Scleroderma Mother      Mental Illness Brother      Alzheimer Disease Maternal Grandmother      Parkinsonism Maternal Grandfather      Anesthesia Reaction No family hx of      Deep Vein Thrombosis (DVT) No family hx of             Allergies:     Allergies   Allergen Reactions     Bees Shortness Of Breath     Cellulitis of the leg            Medications:     Current Outpatient Medications   Medication     acetaminophen (TYLENOL) 325 MG tablet     Calcium 200 MG TABS     cyclobenzaprine (FLEXERIL) 5 MG tablet     diphenhydrAMINE-APAP, sleep, (TYLENOL PM EXTRA STRENGTH PO)     EPINEPHrine (EPIPEN/ADRENACLICK/OR ANY BX GENERIC EQUIV) 0.3 MG/0.3ML injection 2-pack     escitalopram (LEXAPRO) 10 MG tablet     estradiol (VIVELLE-DOT) 0.05 MG/24HR bi-weekly patch     LORazepam (ATIVAN) 0.5 MG tablet     order for DME     oxyCODONE (ROXICODONE) 5 MG tablet     polyethylene glycol (MIRALAX) 17 g packet     progesterone (PROMETRIUM) 100 MG capsule     rosuvastatin (CRESTOR) 10 MG tablet     senna-docusate (SENOKOT-S/PERICOLACE) 8.6-50 MG tablet     vitamin D3 (CHOLECALCIFEROL) 2000 units (50 mcg) tablet     No current facility-administered medications for this visit.             Review of Systems:   A focused musculoskeletal and neurologic ROS was performed with pertinent positives and negatives noted in the HPI.  Additional systems were also reviewed and are documented at the bottom of the note.         Physical Exam:   Vitals: Ht 1.651 m (5' 5\")   Wt 74.8 kg (165 lb)   BMI 27.46 kg/m    Musculoskeletal, Neurologic, and Spine:             Lumbar Spine:    Appearance - No gross stepoffs or deformities    Motor -     L2-3: Hip flexion 5/5 R and 5/5 L strength "          L3/4:  Knee extension R 5/5 and L 5/5 strength         L4/5:  Foot dorsiflexion R 5/5 L 5/5 and       EHL dorsiflexion R 4/5 L 4/5 strength         S1:  Plantarflexion/Peroneal Muscles  R 5/5 and L 5/5 strength    Sensation: intact to light touch L3-S1 distribution BLE      Neurologic:      REFLEXES Left Right   Biceps 2+ 2+   Triceps 2+ 2+   Brachioradialis 2+ 2+   Patella 2+ 2+   Ankle jerk 2+ 2+   Babinski No upgoing great toe No upgoing great toe   Clonus 0 beats 0 beats     Hip Exam:  No pain with hip log roll and no tenderness over the greater trochanters.    Alignment:  Patient stands with a neutral standing sagittal balance.           Imaging:   We ordered and independently reviewed new radiographs at this clinic visit. The results were discussed with the patient. Findings include:     XR 1/11/2022: Stable alignment of hardware with evidence of bone healing.       Assessment and Plan:     61 year old female s/p revision right sacroiliac joint fusion.  She has been progressing well postoperatively.  Her stiffness with standing and subjective weakness will likely improve with continued rehab.  She has plans to return to physical therapy as well as exercise at home using a stationary bicycle.     Plan:  -Follow-up in 6 months with PA with repeat imaging.  -Continue physical therapy with focus on low back and glute strengthening       --  Sami Nunez MD  Orthopedic Surgery PGY-1    Patient seen and discussed with Dr. Urbina.    Attending MD (Dr. Mac Urbina) :  I reviewed and verified the history and physical exam of the patient and discussed the patient's management with the other clinical providers involved in this patient's care including any involved residents or physicians assistants. I reviewed the above note and agree with the documented findings and plan of care, which were communicated to the patient.      Mac Urbina MD

## 2022-01-11 NOTE — LETTER
2022         RE: Kristal Lester  [address] [place] [address]        Dear Colleague,    Thank you for referring your patient, Kristal Lester, to the [facility] [place]. Please see a copy of my visit note below.    Spine Surgery Return Clinic Visit      Chief Complaint:   RECHECK (3 month follow up DOS 21 Revision right sacroiliac joint fusion with iliosacral screws and open posterior joint debridement and grafting, use of O-Arm/stealth navigation, allograft, local autograft, and bone morphogenic)      Interval HPI:  Symptom Profile Including: location of symptoms, onset, severity, exacerbating/alleviating factors, previous treatments:        Kristal Lester is a 61 year old female who is 5 months post revision right SI joint fusion.  She reports the surgery has improved her symptoms.  She initiated physical therapy 1 month ago for 1 appointment.  However, she has not been able to return to physical therapy due to being busy with being coming home.  She now complains of some pain with standing up from sitting, and some subjective weakness.              Past Medical History:     Past Medical History:   Diagnosis Date     Benign paroxysmal positional vertigo, left 7/10/2019     HLD (hyperlipidemia)      PVC's (premature ventricular contractions)      SI (sacroiliac) joint dysfunction             Past Surgical History:     Past Surgical History:   Procedure Laterality Date     Bunion surgery  2017      SECTION  5 years ago, 35 years ago     ELBOW SURGERY Right      MAMMOPLASTY AUGMENTATION  23 years ago     OPTICAL TRACKING SYSTEM FUSION REVISION SACRAL ILIAC N/A 2021    Procedure: Revision right sacroiliac joint fusion with iliosacral screws and open posterior joint debridement and grafting, use of O-Arm/stealth navigation, allograft, local autograft, and bone morphogenic;  Surgeon: Mac Urbina MD;  Location: [facility] OR     OPTICAL  TRACKING SYSTEM FUSION SACRAL ILIAC Right 8/22/2019    Procedure: Right Minimal Invasive Sacral Iliac Joint Fusion;  Surgeon: Mac Urbina MD;  Location: [facility] OR            Social History:     Social History     Tobacco Use     Smoking status: Never Smoker     Smokeless tobacco: Never Used   Substance Use Topics     Alcohol use: Not Currently            Family History:     Family History   Problem Relation Age of Onset     Chronic Obstructive Pulmonary Disease Father      Heart Disease Father      Hyperlipidemia Father      Scleroderma Mother      Mental Illness Brother      Alzheimer Disease Maternal Grandmother      Parkinsonism Maternal Grandfather      Anesthesia Reaction No family hx of      Deep Vein Thrombosis (DVT) No family hx of             Allergies:     Allergies   Allergen Reactions     Bees Shortness Of Breath     Cellulitis of the leg            Medications:     Current Outpatient Medications   Medication     acetaminophen (TYLENOL) 325 MG tablet     Calcium 200 MG TABS     cyclobenzaprine (FLEXERIL) 5 MG tablet     diphenhydrAMINE-APAP, sleep, (TYLENOL PM EXTRA STRENGTH PO)     EPINEPHrine (EPIPEN/ADRENACLICK/OR ANY BX GENERIC EQUIV) 0.3 MG/0.3ML injection 2-pack     escitalopram (LEXAPRO) 10 MG tablet     estradiol (VIVELLE-DOT) 0.05 MG/24HR bi-weekly patch     LORazepam (ATIVAN) 0.5 MG tablet     order for DME     oxyCODONE (ROXICODONE) 5 MG tablet     polyethylene glycol (MIRALAX) 17 g packet     progesterone (PROMETRIUM) 100 MG capsule     rosuvastatin (CRESTOR) 10 MG tablet     senna-docusate (SENOKOT-S/PERICOLACE) 8.6-50 MG tablet     vitamin D3 (CHOLECALCIFEROL) 2000 units (50 mcg) tablet     No current facility-administered medications for this visit.             Review of Systems:   A focused musculoskeletal and neurologic ROS was performed with pertinent positives and negatives noted in the HPI.  Additional systems were also reviewed and are documented at the bottom of the  "note.         Physical Exam:   Vitals: Ht 1.651 m (5' 5\")   Wt 74.8 kg (165 lb)   BMI 27.46 kg/m    Musculoskeletal, Neurologic, and Spine:             Lumbar Spine:    Appearance - No gross stepoffs or deformities    Motor -     L2-3: Hip flexion 5/5 R and 5/5 L strength          L3/4:  Knee extension R 5/5 and L 5/5 strength         L4/5:  Foot dorsiflexion R 5/5 L 5/5 and       EHL dorsiflexion R 4/5 L 4/5 strength         S1:  Plantarflexion/Peroneal Muscles  R 5/5 and L 5/5 strength    Sensation: intact to light touch L3-S1 distribution BLE      Neurologic:      REFLEXES Left Right   Biceps 2+ 2+   Triceps 2+ 2+   Brachioradialis 2+ 2+   Patella 2+ 2+   Ankle jerk 2+ 2+   Babinski No upgoing great toe No upgoing great toe   Clonus 0 beats 0 beats     Hip Exam:  No pain with hip log roll and no tenderness over the greater trochanters.    Alignment:  Patient stands with a neutral standing sagittal balance.           Imaging:   We ordered and independently reviewed new radiographs at this clinic visit. The results were discussed with the patient. Findings include:     XR 1/11/2022: Stable alignment of hardware with evidence of bone healing.       Assessment and Plan:     61 year old female s/p revision right sacroiliac joint fusion.  She has been progressing well postoperatively.  Her stiffness with standing and subjective weakness will likely improve with continued rehab.  She has plans to return to physical therapy as well as exercise at home using a stationary bicycle.     Plan:  -Follow-up in 6 months with PA with repeat imaging.  -Continue physical therapy with focus on low back and glute strengthening       --  Sami Nunez MD  Orthopedic Surgery PGY-1    Patient seen and discussed with Dr. Urbina.    Attending MD (Dr. Mac Urbina) :  I reviewed and verified the history and physical exam of the patient and discussed the patient's management with the other clinical providers involved in this " patient's care including any involved residents or physicians assistants. I reviewed the above note and agree with the documented findings and plan of care, which were communicated to the patient.      Mac Urbina MD

## 2022-05-16 ENCOUNTER — HEALTH MAINTENANCE LETTER (OUTPATIENT)
Age: 62
End: 2022-05-16

## 2022-06-13 ENCOUNTER — MYC MEDICAL ADVICE (OUTPATIENT)
Dept: ORTHOPEDICS | Facility: CLINIC | Age: 62
End: 2022-06-13
Payer: COMMERCIAL

## 2022-06-14 NOTE — TELEPHONE ENCOUNTER
Patient messaged on mychart stating their left hand has been excessively weak lately and wants to readdress it. I gave her the scheduling number.      Segundo Arzate, EMT

## 2022-06-21 ENCOUNTER — TRANSFERRED RECORDS (OUTPATIENT)
Dept: HEALTH INFORMATION MANAGEMENT | Facility: CLINIC | Age: 62
End: 2022-06-21

## 2022-06-21 DIAGNOSIS — M53.3 SI (SACROILIAC) JOINT DYSFUNCTION: Primary | ICD-10-CM

## 2022-06-28 ASSESSMENT — ENCOUNTER SYMPTOMS
JOINT SWELLING: 0
ARTHRALGIAS: 1
BACK PAIN: 1
MYALGIAS: 1
STIFFNESS: 1
MUSCLE WEAKNESS: 0
NECK PAIN: 1
MUSCLE CRAMPS: 0

## 2022-07-05 ENCOUNTER — OFFICE VISIT (OUTPATIENT)
Dept: ORTHOPEDICS | Facility: CLINIC | Age: 62
End: 2022-07-05
Payer: COMMERCIAL

## 2022-07-05 ENCOUNTER — ANCILLARY PROCEDURE (OUTPATIENT)
Dept: GENERAL RADIOLOGY | Facility: CLINIC | Age: 62
End: 2022-07-05
Attending: ORTHOPAEDIC SURGERY
Payer: COMMERCIAL

## 2022-07-05 VITALS — HEIGHT: 65 IN | BODY MASS INDEX: 27.67 KG/M2

## 2022-07-05 DIAGNOSIS — M54.12 CERVICAL RADICULAR PAIN: Primary | ICD-10-CM

## 2022-07-05 DIAGNOSIS — M53.3 SI (SACROILIAC) JOINT DYSFUNCTION: Primary | ICD-10-CM

## 2022-07-05 DIAGNOSIS — M50.30 DDD (DEGENERATIVE DISC DISEASE), CERVICAL: ICD-10-CM

## 2022-07-05 PROCEDURE — 72190 X-RAY EXAM OF PELVIS: CPT | Performed by: RADIOLOGY

## 2022-07-05 PROCEDURE — 99214 OFFICE O/P EST MOD 30 MIN: CPT | Performed by: PREVENTIVE MEDICINE

## 2022-07-05 PROCEDURE — 99214 OFFICE O/P EST MOD 30 MIN: CPT | Mod: 25 | Performed by: ORTHOPAEDIC SURGERY

## 2022-07-05 PROCEDURE — 20610 DRAIN/INJ JOINT/BURSA W/O US: CPT | Mod: LT | Performed by: ORTHOPAEDIC SURGERY

## 2022-07-05 RX ORDER — CELECOXIB 100 MG/1
100 CAPSULE ORAL 2 TIMES DAILY
Qty: 60 CAPSULE | Refills: 0 | Status: SHIPPED | OUTPATIENT
Start: 2022-07-05 | End: 2022-08-04

## 2022-07-05 RX ADMIN — TRIAMCINOLONE ACETONIDE 40 MG: 40 INJECTION, SUSPENSION INTRA-ARTICULAR; INTRAMUSCULAR at 15:02

## 2022-07-05 RX ADMIN — LIDOCAINE HYDROCHLORIDE 5 ML: 10 INJECTION, SOLUTION EPIDURAL; INFILTRATION; INTRACAUDAL; PERINEURAL at 15:02

## 2022-07-05 NOTE — LETTER
7/5/2022      RE: Kristal Lester  [address] [place] [address]     Dear Colleague,    Thank you for referring your patient, Kristal Lester, to the [facility] [place]. Please see a copy of my visit note below.    HISTORY OF PRESENT ILLNESS  Ms. Lester is a pleasant 61 year old year old female who presents to clinic today with left arm and hand pain and tingling  Has had on/off neck pain for past year  Her left hand and arm have worsened  Kristal explains that   Location: neck, left arm  Quality:  achy pain    Severity: 6/10 at worst    Duration: worse over past months  Timing: occurs intermittently    Modifying factors:  resting and non-use makes it better, movement and use makes it worse  Associated signs & symptoms:tingling in left hand    MEDICAL HISTORY  Patient Active Problem List   Diagnosis     Ulnocarpal impingement syndrome     Hallux valgus with bunions     High frequency hearing loss of both ears     Hyperlipidemia     Pineal gland cyst     Benign paroxysmal positional vertigo     SI (sacroiliac) joint dysfunction       Current Outpatient Medications   Medication Sig Dispense Refill     acetaminophen (TYLENOL) 325 MG tablet Take 2 tablets (650 mg) by mouth every 4 hours as needed for other (mild pain) 100 tablet 0     Calcium 200 MG TABS Take by mouth every morning        cyclobenzaprine (FLEXERIL) 5 MG tablet Take 1-2 tablets (5-10 mg) by mouth nightly as needed for muscle spasms 45 tablet 0     diphenhydrAMINE-APAP, sleep, (TYLENOL PM EXTRA STRENGTH PO) Take by mouth daily as needed        EPINEPHrine (EPIPEN/ADRENACLICK/OR ANY BX GENERIC EQUIV) 0.3 MG/0.3ML injection 2-pack Inject 0.3 mg into the muscle as needed for anaphylaxis       escitalopram (LEXAPRO) 10 MG tablet Take 10 mg by mouth every morning        estradiol (VIVELLE-DOT) 0.05 MG/24HR bi-weekly patch Place 1 patch onto the skin twice a week        LORazepam (ATIVAN) 0.5 MG tablet Take  0.5 mg by mouth At Bedtime        order for DME SI-loc belt 1 Units 0     oxyCODONE (ROXICODONE) 5 MG tablet Take 1-2 tablets (5-10 mg) by mouth every 4 hours as needed for pain (Moderate to Severe) 30 tablet 0     polyethylene glycol (MIRALAX) 17 g packet Take 17 g by mouth daily 7 packet 0     progesterone (PROMETRIUM) 100 MG capsule Take 100 mg by mouth At Bedtime        rosuvastatin (CRESTOR) 10 MG tablet Take 10 mg by mouth At Bedtime       senna-docusate (SENOKOT-S/PERICOLACE) 8.6-50 MG tablet Take 1-2 tablets by mouth 2 times daily Take while on oral narcotics to prevent or treat constipation. 30 tablet 0     vitamin D3 (CHOLECALCIFEROL) 2000 units (50 mcg) tablet Take 1 tablet by mouth every morning          Allergies   Allergen Reactions     Bees Shortness Of Breath     Cellulitis of the leg       Family History   Problem Relation Age of Onset     Chronic Obstructive Pulmonary Disease Father      Heart Disease Father      Hyperlipidemia Father      Scleroderma Mother      Mental Illness Brother      Alzheimer Disease Maternal Grandmother      Parkinsonism Maternal Grandfather      Anesthesia Reaction No family hx of      Deep Vein Thrombosis (DVT) No family hx of      Social History     Socioeconomic History     Marital status:    Tobacco Use     Smoking status: Never Smoker     Smokeless tobacco: Never Used   Substance and Sexual Activity     Alcohol use: Not Currently     Drug use: Not Currently       Additional medical/Social/Surgical histories reviewed in [facility] and updated as appropriate.     REVIEW OF SYSTEMS (7/5/2022)  10 point ROS of systems including Constitutional, Eyes, Respiratory, Cardiovascular, Gastroenterology, Genitourinary, Integumentary, Musculoskeletal, Psychiatric, Allergic/Immunologic were all negative except for pertinent positives noted in my HPI.     PHYSICAL EXAM  Vital Signs: There were no vitals taken for this visit. Patient declined being weighed. There is no height or  weight on file to calculate BMI.    General  - normal appearance, in no obvious distress  HEENT  - conjunctivae not injected, moist mucous membranes, normocephalic/atraumatic head, ears normal appearance, no lesions, mouth normal appearance, no scars, normal dentition and teeth present  CV  - normal peripheral perfusion  Pulm  - normal respiratory pattern, non-labored    Musculoskeletal - CERVICAL SPINE  - stance and posture: normal gait without limp, no obvious leg length discrepancy, normal heel and toe walk, normal balance, slightly forward shoulders, head balanced normally on trunk  - inspection: normal bone and joint alignment, no obvious kyphosis  - palpation: no paravertebral or bony tenderness, except at base of neck and trapezius muscles  - ROM: normal and painless flexion, extension, left and right sidebending and rotation with some discomfort  - strength: upper extremities 5/5 in all planes  - special tests:  (+) spurlings    Neuro  - biceps, triceps, supinator DTRs 2+ bilaterally, no sensory or motor deficit, grossly normal coordination, normal muscle tone  Skin  - no ecchymosis, erythema, warmth, or induration, no obvious rash  Psych  - interactive, appropriate, normal mood and affect  ASSESSMENT & PLAN  62 yo female with cervical ddd, disc herniations, radicular pain    I independently reviewed the following imaging studies:  Cervical xray: shows ddd  Discussed and ordered cervical MRI  Discussed and ordered celebrex  Given HEP  F/u after MRI  Appropriate PPE was utilized for prevention of spread of Covid-19.  Santiago Gan MD, CAQSM

## 2022-07-05 NOTE — PROGRESS NOTES
Spine Surgery Return Clinic Visit      Chief Complaint:   RECHECK (Review SI issues DOS 21, has been having pain on her hips that get worse with lifting her feet off the ground when sitting, )      Interval HPI:  Symptom Profile Including: location of symptoms, onset, severity, exacerbating/alleviating factors, previous treatments:        Kristal Lester is a 61 year old female who returns in follow-up today.  She is having quite a bit of low back pain.  She describes it as going across the midline in the low back.  It does not radiate down into the legs.  She feels it is fairly disabling.  She does think the right-sided surgery helped her, but she also has some occasional right-sided pain, but really the left side is worst.  She does have significant tenderness over the trochanter on the left side and has difficulty lying on her left side.  She was doing physical therapy and is potentially interested in injection.            Past Medical History:     Past Medical History:   Diagnosis Date     Benign paroxysmal positional vertigo, left 7/10/2019     HLD (hyperlipidemia)      PVC's (premature ventricular contractions)      SI (sacroiliac) joint dysfunction             Past Surgical History:     Past Surgical History:   Procedure Laterality Date     Bunion surgery  2017      SECTION  5 years ago, 35 years ago     ELBOW SURGERY Right      MAMMOPLASTY AUGMENTATION  23 years ago     OPTICAL TRACKING SYSTEM FUSION REVISION SACRAL ILIAC N/A 2021    Procedure: Revision right sacroiliac joint fusion with iliosacral screws and open posterior joint debridement and grafting, use of O-Arm/stealth navigation, allograft, local autograft, and bone morphogenic;  Surgeon: Mac Urbina MD;  Location: [facility] OR     OPTICAL TRACKING SYSTEM FUSION SACRAL ILIAC Right 2019    Procedure: Right Minimal Invasive Sacral Iliac Joint Fusion;  Surgeon: Mac Urbina MD;  Location: [facility] OR          "   Social History:     Social History     Tobacco Use     Smoking status: Never Smoker     Smokeless tobacco: Never Used   Substance Use Topics     Alcohol use: Not Currently            Family History:     Family History   Problem Relation Age of Onset     Chronic Obstructive Pulmonary Disease Father      Heart Disease Father      Hyperlipidemia Father      Scleroderma Mother      Mental Illness Brother      Alzheimer Disease Maternal Grandmother      Parkinsonism Maternal Grandfather      Anesthesia Reaction No family hx of      Deep Vein Thrombosis (DVT) No family hx of             Allergies:     Allergies   Allergen Reactions     Bees Shortness Of Breath     Cellulitis of the leg            Medications:     Current Outpatient Medications   Medication     acetaminophen (TYLENOL) 325 MG tablet     Calcium 200 MG TABS     cyclobenzaprine (FLEXERIL) 5 MG tablet     diphenhydrAMINE-APAP, sleep, (TYLENOL PM EXTRA STRENGTH PO)     EPINEPHrine (EPIPEN/ADRENACLICK/OR ANY BX GENERIC EQUIV) 0.3 MG/0.3ML injection 2-pack     escitalopram (LEXAPRO) 10 MG tablet     estradiol (VIVELLE-DOT) 0.05 MG/24HR bi-weekly patch     LORazepam (ATIVAN) 0.5 MG tablet     order for DME     oxyCODONE (ROXICODONE) 5 MG tablet     polyethylene glycol (MIRALAX) 17 g packet     progesterone (PROMETRIUM) 100 MG capsule     rosuvastatin (CRESTOR) 10 MG tablet     senna-docusate (SENOKOT-S/PERICOLACE) 8.6-50 MG tablet     vitamin D3 (CHOLECALCIFEROL) 2000 units (50 mcg) tablet     No current facility-administered medications for this visit.             Review of Systems:   A focused musculoskeletal and neurologic ROS was performed with pertinent positives and negatives noted in the HPI.  Additional systems were also reviewed and are documented at the bottom of the note.         Physical Exam:   Vitals: Ht 1.645 m (5' 4.75\")   BMI 27.67 kg/m    Musculoskeletal, Neurologic, and Spine:          Lumbar Spine:    Appearance - No gross stepoffs or " deformities    Motor -     L2-3: Hip flexion 5/5 R and 5/5 L strength          L3/4:  Knee extension R 5/5 and L 5/5 strength         L4/5:  Foot dorsiflexion R 5/5 L 5/5 and               S1:  Plantarflexion/Peroneal Muscles  R 5/5 and L 5/5 strength    Sensation: intact to light touch L3-S1 distribution BLE      Neurologic:      REFLEXES Left Right                  Patella 1+ 1+   Ankle jerk 1+ 1+   Babinski No upgoing great toe No upgoing great toe   Clonus 0 beats 0 beats     Hip Exam:   Very tender over left greater trochanter, positive SI joint provocative maneuvers on the left including hip thrust, anterior pelvic compression and Gaenslen's    Alignment:  Patient stands with a neutral standing sagittal balance.           Imaging:   We ordered and independently reviewed new radiographs at this clinic visit. The results were discussed with the patient. Findings include:    Pelvic pelvic radiographs July 5, 2022 show previous right sided revision SI fusion with instrumentation in place no obvious complication       Assessment and Plan:     61 year old female with left SI joint dysfunction and probable greater trochanteric bursitis.    I provided a new PT referral and also provided a left greater trochanteric injection today.  We also going to set her up with a CT-guided SI joint injection.  It is possible that at some point the remaining SI joint on the left would need to be addressed surgically were going to see how things go with these nonoperative treatments.  Asked her to update me after the injection.    Procedure:    The skin over the left greater trochanter was prepared with alcohol.  We then used a spinal needle to inject a mixture of lidocaine and Kenalog in a sterile fashion.  A Band-Aid was applied.  There were no immediately evident complications.    I personally performed the injection.           Respectfully,  Mac Urbina MD  Spine Surgery  [facility]    Answers for HPI/ROS  submitted by the patient on 6/28/2022  General Symptoms: No  Skin Symptoms: No  HENT Symptoms: No  EYE SYMPTOMS: No  HEART SYMPTOMS: No  LUNG SYMPTOMS: No  INTESTINAL SYMPTOMS: No  URINARY SYMPTOMS: No  GYNECOLOGIC SYMPTOMS: No  BREAST SYMPTOMS: No  SKELETAL SYMPTOMS: Yes  BLOOD SYMPTOMS: No  NERVOUS SYSTEM SYMPTOMS: No  MENTAL HEALTH SYMPTOMS: No  Back pain: Yes  Muscle aches: Yes  Neck pain: Yes  Swollen joints: No  Joint pain: Yes  Bone pain: Yes  Muscle cramps: No  Muscle weakness: No  Joint stiffness: Yes  Bone fracture: No

## 2022-07-05 NOTE — LETTER
2022         RE: Kristal Lester  [address] [place] [address]        Dear Colleague,    Thank you for referring your patient, Kristal Lester, to the [facility]. Please see a copy of my visit note below.    Large Joint Injection/Arthocentesis: L greater trochanteric bursa    Date/Time: 2022 3:02 PM  Performed by: Mac Urbina MD  Authorized by: Mac Urbina MD     Indications:  Pain  Needle Size:  18 G  Guidance: landmark guided    Location:  Hip      Site:  L greater trochanteric bursa  Medications:  40 mg triamcinolone 40 MG/ML; 5 mL lidocaine (PF) 1 %  Outcome:  Tolerated well, no immediate complications  Procedure discussed: discussed risks, benefits, and alternatives    Consent Given by:  Patient  Timeout: timeout called immediately prior to procedure     Left Greater Trochanteric Bursa Injection using 5 ml lidocaine and 1 ml kenalog 40 using a 18g spinal needle.       [facility] [address] [address] [phone]  Dept: [phone]  ______________________________________________________________________________    Patient: Kristal Lester   : 1960   MRN: [id number]   2022    INVASIVE PROCEDURE SAFETY CHECKLIST    Date: 2022   Procedure:Left Greater Trochanteric Bursa Injection   Patient Name: Kristal Lester  MRN: [id number]  YOB: 1960    Action: Complete sections as appropriate. Any discrepancy results in a HARD COPY until resolved.     PRE PROCEDURE:  Patient ID verified with 2 identifiers (name and  or MRN): Yes  Procedure and site verified with patient/designee (when able): Yes  Accurate consent documentation in medical record: Yes  H&P (or appropriate assessment) documented in medical record: NA  H&P must be up to 20 days prior to procedure and updates within 24 hours of procedure as  applicable: NA  Relevant diagnostic and radiology test results appropriately labeled and displayed as applicable: NA  Procedure site(s) marked with provider initials: NA    TIMEOUT:  Time-Out performed immediately prior to starting procedure, including verbal and active participation of all team members addressing the following:NA  * Correct patient identify  * Confirmed that the correct side and site are marked  * An accurate procedure consent form  * Agreement on the procedure to be done  * Correct patient position  * Relevant images and results are properly labeled and appropriately displayed  * The need to administer antibiotics or fluids for irrigation purposes during the procedure as applicable   * Safety precautions based on patient history or medication use    DURING PROCEDURE: Verification of correct person, site, and procedures any time the responsibility for care of the patient is transferred to another member of the care team.       The following medications were given:         Prior to injection, verified patient identity using patient's name and date of birth.  Due to injection administration, patient instructed to remain in clinic for 15 minutes  afterwards, and to report any adverse reaction to me immediately.    Bursa injection was performed.    Medication Name: Lidocaine NDC 98443-217-37  Drug Amount Wasted:  None.  Vial/Syringe: Single dose vial  Expiration Date:  12/1/25    Medication Name Kenalog NDC [address]-3    Scribed by Tae Canseco ATC for Dr. Urbina on July 5, 2022 at 3:00 pm based on the provider's statements to me.     Tae Canseco ATC    Answers for HPI/ROS submitted by the patient on 6/28/2022  General Symptoms: No  Skin Symptoms: No  HENT Symptoms: No  EYE SYMPTOMS: No  HEART SYMPTOMS: No  LUNG SYMPTOMS: No  INTESTINAL SYMPTOMS: No  URINARY SYMPTOMS: No  GYNECOLOGIC SYMPTOMS: No  BREAST SYMPTOMS: No  SKELETAL SYMPTOMS: Yes  BLOOD SYMPTOMS: No  NERVOUS SYSTEM SYMPTOMS: No  MENTAL  HEALTH SYMPTOMS: No  Back pain: Yes  Muscle aches: Yes  Neck pain: Yes  Swollen joints: No  Joint pain: Yes  Bone pain: Yes  Muscle cramps: No  Muscle weakness: No  Joint stiffness: Yes  Bone fracture: No          Spine Surgery Return Clinic Visit      Chief Complaint:   RECHECK (Review SI issues DOS 21, has been having pain on her hips that get worse with lifting her feet off the ground when sitting, )      Interval HPI:  Symptom Profile Including: location of symptoms, onset, severity, exacerbating/alleviating factors, previous treatments:        Kristal Lester is a 61 year old female who returns in follow-up today.  She is having quite a bit of low back pain.  She describes it as going across the midline in the low back.  It does not radiate down into the legs.  She feels it is fairly disabling.  She does think the right-sided surgery helped her, but she also has some occasional right-sided pain, but really the left side is worst.  She does have significant tenderness over the trochanter on the left side and has difficulty lying on her left side.  She was doing physical therapy and is potentially interested in injection.            Past Medical History:     Past Medical History:   Diagnosis Date     Benign paroxysmal positional vertigo, left 7/10/2019     HLD (hyperlipidemia)      PVC's (premature ventricular contractions)      SI (sacroiliac) joint dysfunction             Past Surgical History:     Past Surgical History:   Procedure Laterality Date     Bunion surgery  2017      SECTION  5 years ago, 35 years ago     ELBOW SURGERY Right      MAMMOPLASTY AUGMENTATION  23 years ago     OPTICAL TRACKING SYSTEM FUSION REVISION SACRAL ILIAC N/A 2021    Procedure: Revision right sacroiliac joint fusion with iliosacral screws and open posterior joint debridement and grafting, use of O-Arm/stealth navigation, allograft, local autograft, and bone morphogenic;  Surgeon: Mac Urbina MD;   Location: [facility] OR     OPTICAL TRACKING SYSTEM FUSION SACRAL ILIAC Right 8/22/2019    Procedure: Right Minimal Invasive Sacral Iliac Joint Fusion;  Surgeon: Mac Urbina MD;  Location: [facility] OR            Social History:     Social History     Tobacco Use     Smoking status: Never Smoker     Smokeless tobacco: Never Used   Substance Use Topics     Alcohol use: Not Currently            Family History:     Family History   Problem Relation Age of Onset     Chronic Obstructive Pulmonary Disease Father      Heart Disease Father      Hyperlipidemia Father      Scleroderma Mother      Mental Illness Brother      Alzheimer Disease Maternal Grandmother      Parkinsonism Maternal Grandfather      Anesthesia Reaction No family hx of      Deep Vein Thrombosis (DVT) No family hx of             Allergies:     Allergies   Allergen Reactions     Bees Shortness Of Breath     Cellulitis of the leg            Medications:     Current Outpatient Medications   Medication     acetaminophen (TYLENOL) 325 MG tablet     Calcium 200 MG TABS     cyclobenzaprine (FLEXERIL) 5 MG tablet     diphenhydrAMINE-APAP, sleep, (TYLENOL PM EXTRA STRENGTH PO)     EPINEPHrine (EPIPEN/ADRENACLICK/OR ANY BX GENERIC EQUIV) 0.3 MG/0.3ML injection 2-pack     escitalopram (LEXAPRO) 10 MG tablet     estradiol (VIVELLE-DOT) 0.05 MG/24HR bi-weekly patch     LORazepam (ATIVAN) 0.5 MG tablet     order for DME     oxyCODONE (ROXICODONE) 5 MG tablet     polyethylene glycol (MIRALAX) 17 g packet     progesterone (PROMETRIUM) 100 MG capsule     rosuvastatin (CRESTOR) 10 MG tablet     senna-docusate (SENOKOT-S/PERICOLACE) 8.6-50 MG tablet     vitamin D3 (CHOLECALCIFEROL) 2000 units (50 mcg) tablet     No current facility-administered medications for this visit.             Review of Systems:   A focused musculoskeletal and neurologic ROS was performed with pertinent positives and negatives noted in the HPI.  Additional systems were also reviewed and are  "documented at the bottom of the note.         Physical Exam:   Vitals: Ht 1.645 m (5' 4.75\")   BMI 27.67 kg/m    Musculoskeletal, Neurologic, and Spine:          Lumbar Spine:    Appearance - No gross stepoffs or deformities    Motor -     L2-3: Hip flexion 5/5 R and 5/5 L strength          L3/4:  Knee extension R 5/5 and L 5/5 strength         L4/5:  Foot dorsiflexion R 5/5 L 5/5 and               S1:  Plantarflexion/Peroneal Muscles  R 5/5 and L 5/5 strength    Sensation: intact to light touch L3-S1 distribution BLE      Neurologic:      REFLEXES Left Right                  Patella 1+ 1+   Ankle jerk 1+ 1+   Babinski No upgoing great toe No upgoing great toe   Clonus 0 beats 0 beats     Hip Exam:   Very tender over left greater trochanter, positive SI joint provocative maneuvers on the left including hip thrust, anterior pelvic compression and Gaenslen's    Alignment:  Patient stands with a neutral standing sagittal balance.           Imaging:   We ordered and independently reviewed new radiographs at this clinic visit. The results were discussed with the patient. Findings include:    Pelvic pelvic radiographs July 5, 2022 show previous right sided revision SI fusion with instrumentation in place no obvious complication       Assessment and Plan:     61 year old female with left SI joint dysfunction and probable greater trochanteric bursitis.    I provided a new PT referral and also provided a left greater trochanteric injection today.  We also going to set her up with a CT-guided SI joint injection.  It is possible that at some point the remaining SI joint on the left would need to be addressed surgically were going to see how things go with these nonoperative treatments.  Asked her to update me after the injection.    Procedure:    The skin over the left greater trochanter was prepared with alcohol.  We then used a spinal needle to inject a mixture of lidocaine and Kenalog in a sterile fashion.  A Band-Aid was " applied.  There were no immediately evident complications.    I personally performed the injection.           Respectfully,  Mac Urbina MD  Spine Surgery  [facility]    Answers for HPI/ROS submitted by the patient on 6/28/2022  General Symptoms: No  Skin Symptoms: No  HENT Symptoms: No  EYE SYMPTOMS: No  HEART SYMPTOMS: No  LUNG SYMPTOMS: No  INTESTINAL SYMPTOMS: No  URINARY SYMPTOMS: No  GYNECOLOGIC SYMPTOMS: No  BREAST SYMPTOMS: No  SKELETAL SYMPTOMS: Yes  BLOOD SYMPTOMS: No  NERVOUS SYSTEM SYMPTOMS: No  MENTAL HEALTH SYMPTOMS: No  Back pain: Yes  Muscle aches: Yes  Neck pain: Yes  Swollen joints: No  Joint pain: Yes  Bone pain: Yes  Muscle cramps: No  Muscle weakness: No  Joint stiffness: Yes  Bone fracture: No

## 2022-07-05 NOTE — PROGRESS NOTES
Large Joint Injection/Arthocentesis: L greater trochanteric bursa    Date/Time: 2022 3:02 PM  Performed by: Mac Urbina MD  Authorized by: Mac Urbina MD     Indications:  Pain  Needle Size:  18 G  Guidance: landmark guided    Location:  Hip      Site:  L greater trochanteric bursa  Medications:  40 mg triamcinolone 40 MG/ML; 5 mL lidocaine (PF) 1 %  Outcome:  Tolerated well, no immediate complications  Procedure discussed: discussed risks, benefits, and alternatives    Consent Given by:  Patient  Timeout: timeout called immediately prior to procedure     Left Greater Trochanteric Bursa Injection using 5 ml lidocaine and 1 ml kenalog 40 using a 18g spinal needle.       [facility] [address] [address] [phone]  Dept: [phone]  ______________________________________________________________________________    Patient: Kristal Lester   : 1960   MRN: [id number]   2022    INVASIVE PROCEDURE SAFETY CHECKLIST    Date: 2022   Procedure:Left Greater Trochanteric Bursa Injection   Patient Name: Kristal Lester  MRN: [id number]  YOB: 1960    Action: Complete sections as appropriate. Any discrepancy results in a HARD COPY until resolved.     PRE PROCEDURE:  Patient ID verified with 2 identifiers (name and  or MRN): Yes  Procedure and site verified with patient/designee (when able): Yes  Accurate consent documentation in medical record: Yes  H&P (or appropriate assessment) documented in medical record: NA  H&P must be up to 20 days prior to procedure and updates within 24 hours of procedure as applicable: NA  Relevant diagnostic and radiology test results appropriately labeled and displayed as applicable: NA  Procedure site(s) marked with provider initials: NA    TIMEOUT:  Time-Out performed immediately prior to starting procedure, including verbal and active  participation of all team members addressing the following:NA  * Correct patient identify  * Confirmed that the correct side and site are marked  * An accurate procedure consent form  * Agreement on the procedure to be done  * Correct patient position  * Relevant images and results are properly labeled and appropriately displayed  * The need to administer antibiotics or fluids for irrigation purposes during the procedure as applicable   * Safety precautions based on patient history or medication use    DURING PROCEDURE: Verification of correct person, site, and procedures any time the responsibility for care of the patient is transferred to another member of the care team.       The following medications were given:         Prior to injection, verified patient identity using patient's name and date of birth.  Due to injection administration, patient instructed to remain in clinic for 15 minutes  afterwards, and to report any adverse reaction to me immediately.    Bursa injection was performed.    Medication Name: Lidocaine NDC 92703-065-63  Drug Amount Wasted:  None.  Vial/Syringe: Single dose vial  Expiration Date:  12/1/25    Medication Name Kenalog NDC [address]-6    Scribed by Tae Canseco ATC for Dr. Urbina on July 5, 2022 at 3:00 pm based on the provider's statements to me.     Tae Canseco ATC    Answers for HPI/ROS submitted by the patient on 6/28/2022  General Symptoms: No  Skin Symptoms: No  HENT Symptoms: No  EYE SYMPTOMS: No  HEART SYMPTOMS: No  LUNG SYMPTOMS: No  INTESTINAL SYMPTOMS: No  URINARY SYMPTOMS: No  GYNECOLOGIC SYMPTOMS: No  BREAST SYMPTOMS: No  SKELETAL SYMPTOMS: Yes  BLOOD SYMPTOMS: No  NERVOUS SYSTEM SYMPTOMS: No  MENTAL HEALTH SYMPTOMS: No  Back pain: Yes  Muscle aches: Yes  Neck pain: Yes  Swollen joints: No  Joint pain: Yes  Bone pain: Yes  Muscle cramps: No  Muscle weakness: No  Joint stiffness: Yes  Bone fracture: No

## 2022-07-05 NOTE — NURSING NOTE
"Reason For Visit:   Chief Complaint   Patient presents with     RECHECK     Review SI issues DOS 8/13/21, has been having pain on her hips that get worse with lifting her feet off the ground when sitting,        Primary MD: [facility]  Ref. MD: Jules     ?  No  Date of surgery: Dr. Urbina   Type of surgery: Dr. Mcgraw .  Smoker: No  Request smoking cessation information: No    Ht 1.645 m (5' 4.75\")   BMI 27.67 kg/m           Oswestry (NIKHIL) Questionnaire    OSWESTRY DISABILITY INDEX 6/28/2022   Count 10   Sum 14   Oswestry Score (%) 28   Some recent data might be hidden            Neck Disability Index (NDI) Questionnaire    No flowsheet data found.                Promis 10 Assessment    PROMIS 10 9/15/2021   In general, would you say your health is: Good   In general, would you say your quality of life is: Good   In general, how would you rate your physical health? Good   In general, how would you rate your mental health, including your mood and your ability to think? Very good   In general, how would you rate your satisfaction with your social activities and relationships? Fair   In general, please rate how well you carry out your usual social activities and roles Good   To what extent are you able to carry out your everyday physical activities such as walking, climbing stairs, carrying groceries, or moving a chair? A little   How often have you been bothered by emotional problems such as feeling anxious, depressed or irritable? Rarely   How would you rate your fatigue on average? Mild   How would you rate your pain on average?   0 = No Pain  to  10 = Worst Imaginable Pain 6   In general, would you say your health is: 3   In general, would you say your quality of life is: 3   In general, how would you rate your physical health? 3   In general, how would you rate your mental health, including your mood and your ability to think? 4   In general, how would you rate your " satisfaction with your social activities and relationships? 2   In general, please rate how well you carry out your usual social activities and roles. (This includes activities at home, at work and in your community, and responsibilities as a parent, child, spouse, employee, friend, etc.) 3   To what extent are you able to carry out your everyday physical activities such as walking, climbing stairs, carrying groceries, or moving a chair? 2   In the past 7 days, how often have you been bothered by emotional problems such as feeling anxious, depressed, or irritable? 2   In the past 7 days, how would you rate your fatigue on average? 2   In the past 7 days, how would you rate your pain on average, where 0 means no pain, and 10 means worst imaginable pain? 6   Global Mental Health Score 13   Global Physical Health Score 12   PROMIS TOTAL - SUBSCORES 25   Some recent data might be hidden                Tae Canseco, AMARILYS

## 2022-07-05 NOTE — PROGRESS NOTES
HISTORY OF PRESENT ILLNESS  Ms. Lester is a pleasant 61 year old year old female who presents to clinic today with left arm and hand pain and tingling  Has had on/off neck pain for past year  Her left hand and arm have worsened  Kristal explains that   Location: neck, left arm  Quality:  achy pain    Severity: 6/10 at worst    Duration: worse over past months  Timing: occurs intermittently    Modifying factors:  resting and non-use makes it better, movement and use makes it worse  Associated signs & symptoms:tingling in left hand    MEDICAL HISTORY  Patient Active Problem List   Diagnosis     Ulnocarpal impingement syndrome     Hallux valgus with bunions     High frequency hearing loss of both ears     Hyperlipidemia     Pineal gland cyst     Benign paroxysmal positional vertigo     SI (sacroiliac) joint dysfunction       Current Outpatient Medications   Medication Sig Dispense Refill     acetaminophen (TYLENOL) 325 MG tablet Take 2 tablets (650 mg) by mouth every 4 hours as needed for other (mild pain) 100 tablet 0     Calcium 200 MG TABS Take by mouth every morning        cyclobenzaprine (FLEXERIL) 5 MG tablet Take 1-2 tablets (5-10 mg) by mouth nightly as needed for muscle spasms 45 tablet 0     diphenhydrAMINE-APAP, sleep, (TYLENOL PM EXTRA STRENGTH PO) Take by mouth daily as needed        EPINEPHrine (EPIPEN/ADRENACLICK/OR ANY BX GENERIC EQUIV) 0.3 MG/0.3ML injection 2-pack Inject 0.3 mg into the muscle as needed for anaphylaxis       escitalopram (LEXAPRO) 10 MG tablet Take 10 mg by mouth every morning        estradiol (VIVELLE-DOT) 0.05 MG/24HR bi-weekly patch Place 1 patch onto the skin twice a week        LORazepam (ATIVAN) 0.5 MG tablet Take 0.5 mg by mouth At Bedtime        order for DME SI-loc belt 1 Units 0     oxyCODONE (ROXICODONE) 5 MG tablet Take 1-2 tablets (5-10 mg) by mouth every 4 hours as needed for pain (Moderate to Severe) 30 tablet 0     polyethylene glycol (MIRALAX) 17 g packet Take 17 g  by mouth daily 7 packet 0     progesterone (PROMETRIUM) 100 MG capsule Take 100 mg by mouth At Bedtime        rosuvastatin (CRESTOR) 10 MG tablet Take 10 mg by mouth At Bedtime       senna-docusate (SENOKOT-S/PERICOLACE) 8.6-50 MG tablet Take 1-2 tablets by mouth 2 times daily Take while on oral narcotics to prevent or treat constipation. 30 tablet 0     vitamin D3 (CHOLECALCIFEROL) 2000 units (50 mcg) tablet Take 1 tablet by mouth every morning          Allergies   Allergen Reactions     Bees Shortness Of Breath     Cellulitis of the leg       Family History   Problem Relation Age of Onset     Chronic Obstructive Pulmonary Disease Father      Heart Disease Father      Hyperlipidemia Father      Scleroderma Mother      Mental Illness Brother      Alzheimer Disease Maternal Grandmother      Parkinsonism Maternal Grandfather      Anesthesia Reaction No family hx of      Deep Vein Thrombosis (DVT) No family hx of      Social History     Socioeconomic History     Marital status:    Tobacco Use     Smoking status: Never Smoker     Smokeless tobacco: Never Used   Substance and Sexual Activity     Alcohol use: Not Currently     Drug use: Not Currently       Additional medical/Social/Surgical histories reviewed in [facility] and updated as appropriate.     REVIEW OF SYSTEMS (7/5/2022)  10 point ROS of systems including Constitutional, Eyes, Respiratory, Cardiovascular, Gastroenterology, Genitourinary, Integumentary, Musculoskeletal, Psychiatric, Allergic/Immunologic were all negative except for pertinent positives noted in my HPI.     PHYSICAL EXAM  Vital Signs: There were no vitals taken for this visit. Patient declined being weighed. There is no height or weight on file to calculate BMI.    General  - normal appearance, in no obvious distress  HEENT  - conjunctivae not injected, moist mucous membranes, normocephalic/atraumatic head, ears normal appearance, no lesions, mouth normal appearance, no scars, normal dentition  and teeth present  CV  - normal peripheral perfusion  Pulm  - normal respiratory pattern, non-labored    Musculoskeletal - CERVICAL SPINE  - stance and posture: normal gait without limp, no obvious leg length discrepancy, normal heel and toe walk, normal balance, slightly forward shoulders, head balanced normally on trunk  - inspection: normal bone and joint alignment, no obvious kyphosis  - palpation: no paravertebral or bony tenderness, except at base of neck and trapezius muscles  - ROM: normal and painless flexion, extension, left and right sidebending and rotation with some discomfort  - strength: upper extremities 5/5 in all planes  - special tests:  (+) spurlings    Neuro  - biceps, triceps, supinator DTRs 2+ bilaterally, no sensory or motor deficit, grossly normal coordination, normal muscle tone  Skin  - no ecchymosis, erythema, warmth, or induration, no obvious rash  Psych  - interactive, appropriate, normal mood and affect  ASSESSMENT & PLAN  60 yo female with cervical ddd, disc herniations, radicular pain    I independently reviewed the following imaging studies:  Cervical xray: shows ddd  Discussed and ordered cervical MRI  Discussed and ordered celebrex  Given HEP  F/u after MRI  Appropriate PPE was utilized for prevention of spread of Covid-19.  Santiago Gan MD, CAM

## 2022-07-12 RX ORDER — TRIAMCINOLONE ACETONIDE 40 MG/ML
40 INJECTION, SUSPENSION INTRA-ARTICULAR; INTRAMUSCULAR
Status: SHIPPED | OUTPATIENT
Start: 2022-07-05

## 2022-07-12 RX ORDER — LIDOCAINE HYDROCHLORIDE 10 MG/ML
5 INJECTION, SOLUTION EPIDURAL; INFILTRATION; INTRACAUDAL; PERINEURAL
Status: SHIPPED | OUTPATIENT
Start: 2022-07-05

## 2022-07-21 ENCOUNTER — ANCILLARY PROCEDURE (OUTPATIENT)
Dept: MRI IMAGING | Facility: CLINIC | Age: 62
End: 2022-07-21
Attending: PREVENTIVE MEDICINE
Payer: COMMERCIAL

## 2022-07-21 DIAGNOSIS — M50.30 DDD (DEGENERATIVE DISC DISEASE), CERVICAL: ICD-10-CM

## 2022-07-21 DIAGNOSIS — M54.12 CERVICAL RADICULAR PAIN: ICD-10-CM

## 2022-07-21 PROCEDURE — 72141 MRI NECK SPINE W/O DYE: CPT | Performed by: STUDENT IN AN ORGANIZED HEALTH CARE EDUCATION/TRAINING PROGRAM

## 2022-07-27 ENCOUNTER — HOSPITAL ENCOUNTER (OUTPATIENT)
Facility: CLINIC | Age: 62
Discharge: HOME | End: 2022-07-27
Attending: RADIOLOGY | Admitting: PHYSICIAN ASSISTANT
Payer: COMMERCIAL

## 2022-07-27 ENCOUNTER — HOSPITAL ENCOUNTER (OUTPATIENT)
Dept: CT IMAGING | Facility: CLINIC | Age: 62
Discharge: HOME | End: 2022-07-27
Attending: ORTHOPAEDIC SURGERY | Admitting: RADIOLOGY
Payer: COMMERCIAL

## 2022-07-27 DIAGNOSIS — M53.3 SI (SACROILIAC) JOINT DYSFUNCTION: ICD-10-CM

## 2022-07-27 PROCEDURE — 250N000011 HC RX IP 250 OP 636: Performed by: ORTHOPAEDIC SURGERY

## 2022-07-27 PROCEDURE — 250N000009 HC RX 250: Performed by: ORTHOPAEDIC SURGERY

## 2022-07-27 PROCEDURE — 27096 INJECT SACROILIAC JOINT: CPT | Mod: LT | Performed by: PHYSICIAN ASSISTANT

## 2022-07-27 PROCEDURE — 27096 INJECT SACROILIAC JOINT: CPT

## 2022-07-27 RX ORDER — TRIAMCINOLONE ACETONIDE 40 MG/ML
40 INJECTION, SUSPENSION INTRA-ARTICULAR; INTRAMUSCULAR ONCE
Status: COMPLETED | OUTPATIENT
Start: 2022-07-27 | End: 2022-07-27

## 2022-07-27 RX ORDER — LIDOCAINE HYDROCHLORIDE 10 MG/ML
5 INJECTION, SOLUTION EPIDURAL; INFILTRATION; INTRACAUDAL; PERINEURAL ONCE
Status: COMPLETED | OUTPATIENT
Start: 2022-07-27 | End: 2022-07-27

## 2022-07-27 RX ORDER — BUPIVACAINE HYDROCHLORIDE 2.5 MG/ML
10 INJECTION, SOLUTION INFILTRATION; PERINEURAL ONCE
Status: COMPLETED | OUTPATIENT
Start: 2022-07-27 | End: 2022-07-27

## 2022-07-27 RX ADMIN — BUPIVACAINE HYDROCHLORIDE 1 ML: 2.5 INJECTION, SOLUTION EPIDURAL; INFILTRATION; INTRACAUDAL; PERINEURAL at 12:22

## 2022-07-27 RX ADMIN — LIDOCAINE HYDROCHLORIDE 5 ML: 10 INJECTION, SOLUTION EPIDURAL; INFILTRATION; INTRACAUDAL; PERINEURAL at 12:21

## 2022-07-27 RX ADMIN — TRIAMCINOLONE ACETONIDE 40 MG: 40 INJECTION, SUSPENSION INTRA-ARTICULAR; INTRAMUSCULAR at 12:22

## 2022-07-27 NOTE — PROCEDURES
[facility]    Procedure: IR Procedure Note    Date/Time: 7/27/2022 12:24 PM  Performed by: Dimas Lundberg PA-C  Authorized by: Dimas Lundberg PA-C       UNIVERSAL PROTOCOL   Site Marked: NA  Prior Images Obtained and Reviewed:  Yes  Required items: Required blood products, implants, devices and special equipment available    Patient identity confirmed:  Verbally with patient, arm band, provided demographic data and hospital-assigned identification number  Patient was reevaluated immediately before administering moderate or deep sedation or anesthesia  Confirmation Checklist:  Patient's identity using two indicators, relevant allergies, procedure was appropriate and matched the consent or emergent situation and correct equipment/implants were available  Time out: Immediately prior to the procedure a time out was called    Universal Protocol: the Joint Commission Universal Protocol was followed    Preparation: Patient was prepped and draped in usual sterile fashion    ESBL (mL):  0.1     ANESTHESIA    Anesthesia: Local infiltration  Local Anesthetic:  Lidocaine 1% without epinephrine  Anesthetic Total (mL):  5      SEDATION    Patient Sedated: No    Vital signs: Vital signs monitored during sedation    See dictated procedure note for full details.  Findings: CT guided left therapeutic SI joint injection. 2.5 ml of Kenalog 40mg/0.25% bupivacaine solution.    Specimens: none    Complications: None    Condition: Stable    Plan: Follow up per primary team.       PROCEDURE    Patient Tolerance:  Patient tolerated the procedure well with no immediate complications  Length of time physician/provider present for 1:1 monitoring during sedation: 0

## 2022-07-27 NOTE — IP AVS SNAPSHOT
[facility]  [address] [facility] [address]  Phone: [phone]                                      After Visit Summary   7/27/2022    Kristal Lester   MRN: [id number]           After Visit Summary Signature Page    I have received my discharge instructions, and my questions have been answered. I have discussed any challenges I see with this plan with the nurse or doctor.    ..........................................................................................................................................  Patient/Patient Representative Signature      ..........................................................................................................................................  Patient Representative Print Name and Relationship to Patient    ..................................................               ................................................  Date                                   Time    ..........................................................................................................................................  Reviewed by Signature/Title    ...................................................              ..............................................  Date                                               Time          22EPIC Rev 08/18

## 2022-07-27 NOTE — IP AVS SNAPSHOT
After Visit Summary Template Not Found    This Print Group is only intended to be used in the After Visit Summary and can only be used in a report that uses a released After Visit Summary Template.                       MRN:[id number]                          After Visit Summary   7/27/2022    Kristal Lester   MRN: [id number]           Visit Information        Provider Department      7/27/2022 11:30 AM URCT2; UR IR RAD; URIRCT [facility]           Review of your medicines       Notice    This visit is during an admission. Changes to the med list made in this visit will be reflected in the After Visit Summary of the admission.           Protect others around you: Learn how to safely use, store and throw away your medicines at www.disposemymeds.org.       Follow-ups after your visit       Your next 10 appointments already scheduled    Aug 04, 2022  8:00 AM  (Arrive by 7:45 AM)  Return Visit with Santiago Gan MD  [facility] [place] ([facility]) [address] [place] [id number] [phone]        Aug 08, 2022  4:50 PM  (Arrive by 4:35 PM)  Spine Evaluation with Karla Lopez PT  [facility] [place] ([facility] ) [address] [place] [id number] [phone]           Care Instructions       Further instructions from your care team       IR ORTHOPEDIC DISCHARGE INSTRUCTIONS    SI Joint Injection- Left    The Radiologist who performed your procedure: Harpreet Lundberg PA-C    Guidelines for Inpatients and Outpatients:  You should let pain be your guide as to the extent of your activities, maintaining any activity       limitations as prescribed by your physician  You may experience increased or different pain over the next 24-48 hours at the injection site  You may use ice packs for 15 minutes 3 to 4 times a day  for 48 hours for injection site pain  If you should have new numbness down your leg post injection, this is temporary and may last up to 6 hours. You may need assistance with activity until your leg has normal sensation  If you have a bandage on your puncture site, you may remove it the next morning. You may resume showering the next day  No tub baths, whirlpools, or swimming for 48 hours  If you experience any severe pain, fever, or new problems with your bowel or bladder control, contact your Physician or go to the Emergency Room  Inpatients, resume your previous diet and medications if approved by your Physician  DO NOT drive a motor vehicle until morning    For steroid medication injections:  A steroid was injected to help decrease swelling and may help reduce pain. This may take up to 7-10 days to obtain results. (See back of page for more information.)    Guidelines for Outpatients:  _ Resume your regular diet  _ Resume all your other previous medications, including your coumadin, today at your regular dose.  Restart your platelet inhibitors and Aspirin tomorrow  _ If you are restarting Coumadin, check with your Physician to have your INR checked  _ Do not drive a motor vehicle until morning      ___ [facility] Radiology Department..................................[phone]  ___ [facility] Radiology Department ........[phone]  ___ [facility] Radiology Department ............................[phone]  ___ [facility] Radiology Department....................................................[phone]      Instructor___________________________________________________________________    Person Receiving Instructions_________________________________________________    DATE:______________________________ TIME: ___________________________________          Information about your Steroid  Injection    Possible side effects of your steroid injection are temporary and mild and will go away in 2 to 3 days as the body absorbs the steroid.    Some common side effects may include:   Insomnia   Heartburn   Flushed face   Water retention   Increased appetite   Increased blood sugar    Monitor your blood sugar closely if you are diabetic. Contact your Physician tohelp you control your blood sugar if needed.    In some patients a single injection will give permanent relief. Some patients may require multiple injections, up to 3 for maximum effectiveness. If you have more than one steroid injection, they should be spaced 2 weeks apart. Some patients receive NO relief of symptoms from the steroid injection.    Additional Information About Your Visit       [facility]UE[facility] Information    [employer] gives you secure access to your electronic health record. If you see a primary care provider, you can also send messages to your care team and make appointments. If you have questions, please call your primary care clinic.  If you do not have a primary care provider, please call [phone] and they will assist you.       Care EveryWhere ID    This is your Care EveryWhere ID. This could be used by other organizations to access your [facility] medical records  [id number]        Primary Care Provider  [facility] Office Phone #  [phone] Fax #  [phone]      Equal Access to Services    FLORI DIEGO : Phil Deluna, [facility], qaybta kaalmada delmis, yuni tillman. So [facility] [phone].    ATENCIÓN: Si habla español, tiene a moy disposición servicios gratuitos de asistencia lingüística. Obinna al [phone].    We comply with applicable federal and state civil rights laws, including the [place] Human Rights Act. We do not discriminate on the basis of race, color, creed, Taoism, national origin, marital status, age, disability, sex, sexual  orientation, or gender identity.    If you would like an itemization of your charges they will now be available in [employer] 30 days after discharge. To access the itemized statements in [employer] go to billing/billing summary. From there select view account. There will be multiple tabs showing an overview of your account, detail, payments, and communications. From the communications tab you can see your monthly statements, your itemized statements, and any billing letters generated for your account. If you do not have a Be[employer] account and need help getting access please contact Be[employer] support at [phone].  If you would prefer to have your itemized statements mailed please contact our automated itemized bill request line at [phone] option  2.       Thank you!    Thank you for choosing [facility] for your care. Our goal is always to provide you with excellent care. Hearing back from our patients is one way we can continue to improve our services. Please take a few minutes to complete the written survey that you may receive in the mail after you visit with us. Thank you!         Medication List      Notice    This visit is during an admission. Changes to the med list made in this visit will be reflected in the After Visit Summary of the admission.

## 2022-07-27 NOTE — DISCHARGE INSTRUCTIONS
IR ORTHOPEDIC DISCHARGE INSTRUCTIONS    SI Joint Injection- Left    The Radiologist who performed your procedure: Harpreet Lundberg PA-C    Guidelines for Inpatients and Outpatients:  You should let pain be your guide as to the extent of your activities, maintaining any activity       limitations as prescribed by your physician  You may experience increased or different pain over the next 24-48 hours at the injection site  You may use ice packs for 15 minutes 3 to 4 times a day for 48 hours for injection site pain  If you should have new numbness down your leg post injection, this is temporary and may last up to 6 hours. You may need assistance with activity until your leg has normal sensation  If you have a bandage on your puncture site, you may remove it the next morning. You may resume showering the next day  No tub baths, whirlpools, or swimming for 48 hours  If you experience any severe pain, fever, or new problems with your bowel or bladder control, contact your Physician or go to the Emergency Room  Inpatients, resume your previous diet and medications if approved by your Physician  DO NOT drive a motor vehicle until morning    For steroid medication injections:  A steroid was injected to help decrease swelling and may help reduce pain. This may take up to 7-10 days to obtain results. (See back of page for more information.)    Guidelines for Outpatients:  _ Resume your regular diet  _ Resume all your other previous medications, including your coumadin, today at your regular dose.  Restart your platelet inhibitors and Aspirin tomorrow  _ If you are restarting Coumadin, check with your Physician to have your INR checked  _ Do not drive a motor vehicle until morning      ___ [facility] Radiology Department..................................[phone]  ___ [facility] Radiology Department ........[phone]  ___ [facility] Radiology Department  ............................[phone]  ___ [facility] Radiology Department....................................................[phone]      Instructor___________________________________________________________________    Person Receiving Instructions_________________________________________________    DATE:______________________________ TIME: ___________________________________          Information about your Steroid Injection    Possible side effects of your steroid injection are temporary and mild and will go away in 2 to 3 days as the body absorbs the steroid.    Some common side effects may include:   Insomnia   Heartburn   Flushed face   Water retention   Increased appetite   Increased blood sugar    Monitor your blood sugar closely if you are diabetic. Contact your Physician tohelp you control your blood sugar if needed.    In some patients a single injection will give permanent relief. Some patients may require multiple injections, up to 3 for maximum effectiveness. If you have more than one steroid injection, they should be spaced 2 weeks apart. Some patients receive NO relief of symptoms from the steroid injection.

## 2022-08-04 ENCOUNTER — OFFICE VISIT (OUTPATIENT)
Dept: ORTHOPEDICS | Facility: CLINIC | Age: 62
End: 2022-08-04
Payer: COMMERCIAL

## 2022-08-04 DIAGNOSIS — M54.12 CERVICAL RADICULAR PAIN: ICD-10-CM

## 2022-08-04 DIAGNOSIS — M50.30 DDD (DEGENERATIVE DISC DISEASE), CERVICAL: Primary | ICD-10-CM

## 2022-08-04 PROCEDURE — 99214 OFFICE O/P EST MOD 30 MIN: CPT | Performed by: PREVENTIVE MEDICINE

## 2022-08-04 RX ORDER — CELECOXIB 100 MG/1
CAPSULE ORAL
Qty: 60 CAPSULE | Refills: 0 | Status: SHIPPED | OUTPATIENT
Start: 2022-08-04 | End: 2022-09-15

## 2022-08-04 RX ORDER — MELOXICAM 15 MG/1
15 TABLET ORAL DAILY
Qty: 30 TABLET | Refills: 0 | Status: SHIPPED | OUTPATIENT
Start: 2022-08-04 | End: 2022-09-15

## 2022-08-04 RX ORDER — CYCLOBENZAPRINE HCL 5 MG
5-10 TABLET ORAL
Qty: 30 TABLET | Refills: 1 | Status: SHIPPED | OUTPATIENT
Start: 2022-08-04

## 2022-08-04 NOTE — LETTER
8/4/2022         RE: Kristal Lester  [address] [place] [address]        Dear Colleague,    Thank you for referring your patient, Kristal Lester, to the [facility] [place]. Please see a copy of my visit note below.    HISTORY OF PRESENT ILLNESS  Ms. Lester is a pleasant 62 year old year old female who presents to clinic today with ongoing neck pain  Kristal explains that she wants to review her MRI and discuss options  Neck pain radiates into arms      MEDICAL HISTORY  Patient Active Problem List   Diagnosis     Ulnocarpal impingement syndrome     Hallux valgus with bunions     High frequency hearing loss of both ears     Hyperlipidemia     Pineal gland cyst     Benign paroxysmal positional vertigo     SI (sacroiliac) joint dysfunction       Current Outpatient Medications   Medication Sig Dispense Refill     acetaminophen (TYLENOL) 325 MG tablet Take 2 tablets (650 mg) by mouth every 4 hours as needed for other (mild pain) 100 tablet 0     Calcium 200 MG TABS Take by mouth every morning        celecoxib (CELEBREX) 100 MG capsule Take 1 capsule (100 mg) by mouth 2 times daily 60 capsule 0     cyclobenzaprine (FLEXERIL) 5 MG tablet Take 1-2 tablets (5-10 mg) by mouth nightly as needed for muscle spasms 45 tablet 0     diphenhydrAMINE-APAP, sleep, (TYLENOL PM EXTRA STRENGTH PO) Take by mouth daily as needed        EPINEPHrine (EPIPEN/ADRENACLICK/OR ANY BX GENERIC EQUIV) 0.3 MG/0.3ML injection 2-pack Inject 0.3 mg into the muscle as needed for anaphylaxis       escitalopram (LEXAPRO) 10 MG tablet Take 10 mg by mouth every morning        estradiol (VIVELLE-DOT) 0.05 MG/24HR bi-weekly patch Place 1 patch onto the skin twice a week        LORazepam (ATIVAN) 0.5 MG tablet Take 0.5 mg by mouth At Bedtime       order for DME SI-loc belt 1 Units 0     oxyCODONE (ROXICODONE) 5 MG tablet Take 1-2 tablets (5-10 mg) by mouth every 4 hours as needed for pain (Moderate to  Severe) 30 tablet 0     polyethylene glycol (MIRALAX) 17 g packet Take 17 g by mouth daily 7 packet 0     progesterone (PROMETRIUM) 100 MG capsule Take 100 mg by mouth At Bedtime        rosuvastatin (CRESTOR) 10 MG tablet Take 10 mg by mouth At Bedtime       senna-docusate (SENOKOT-S/PERICOLACE) 8.6-50 MG tablet Take 1-2 tablets by mouth 2 times daily Take while on oral narcotics to prevent or treat constipation. 30 tablet 0     vitamin D3 (CHOLECALCIFEROL) 2000 units (50 mcg) tablet Take 1 tablet by mouth every morning          Allergies   Allergen Reactions     Bees Shortness Of Breath     Cellulitis of the leg       Family History   Problem Relation Age of Onset     Chronic Obstructive Pulmonary Disease Father      Heart Disease Father      Hyperlipidemia Father      Scleroderma Mother      Mental Illness Brother      Alzheimer Disease Maternal Grandmother      Parkinsonism Maternal Grandfather      Anesthesia Reaction No family hx of      Deep Vein Thrombosis (DVT) No family hx of      Social History     Socioeconomic History     Marital status:    Tobacco Use     Smoking status: Never Smoker     Smokeless tobacco: Never Used   Substance and Sexual Activity     Alcohol use: Not Currently     Drug use: Not Currently       Additional medical/Social/Surgical histories reviewed in [facility] and updated as appropriate.     REVIEW OF SYSTEMS (8/4/2022)  10 point ROS of systems including Constitutional, Eyes, Respiratory, Cardiovascular, Gastroenterology, Genitourinary, Integumentary, Musculoskeletal, Psychiatric, Allergic/Immunologic were all negative except for pertinent positives noted in my HPI.     PHYSICAL EXAM  VSS  Vital Signs: There were no vitals taken for this visit. Patient declined being weighed. There is no height or weight on file to calculate BMI.    General  - normal appearance, in no obvious distress  HEENT  - conjunctivae not injected, moist mucous membranes, normocephalic/atraumatic head, ears  normal appearance, no lesions, mouth normal appearance, no scars, normal dentition and teeth present  CV  - normal peripheral perfusion  Pulm  - normal respiratory pattern, non-labored    Musculoskeletal - CERVICAL SPINE  - stance and posture: normal gait without limp, no obvious leg length discrepancy, normal heel and toe walk, normal balance, slightly forward shoulders, head balanced normally on trunk  - inspection: normal bone and joint alignment, no obvious kyphosis  - palpation: no paravertebral or bony tenderness, except at base of neck and trapezius muscles  - ROM: normal and painless flexion, extension, left and right sidebending and rotation with some discomfort  - strength: upper extremities 5/5 in all planes  - special tests:  (+) spurlings    Neuro  - biceps, triceps, supinator DTRs 2+ bilaterally, no sensory or motor deficit, grossly normal coordination, normal muscle tone  Skin  - no ecchymosis, erythema, warmth, or induration, no obvious rash  Psych  - interactive, appropriate, normal mood and affect    ASSESSMENT & PLAN  61 yo female with cervical ddd, disc herniations, radicular pain, not resolved      I independently reviewed the following imaging studies:  Cervical MRI: shows ddd, disc herniations  Discussed and ordered MINISTERIO  rx given for flexeril and mobic  Ordered PT    Patient HAS not yet completed physical therapy for 4-6 weeks  Patient has been doing home exercise physical therapy program for this problem      Appropriate PPE was utilized for prevention of spread of Covid-19.  Santiago Gan MD, [facility]        Again, thank you for allowing me to participate in the care of your patient.        Sincerely,        Santiago Gan MD

## 2022-08-04 NOTE — PATIENT INSTRUCTIONS
Thanks for coming today.  Ortho/Sports Medicine Clinic  [address] [place] [address]    To schedule future appointments in Ortho Clinic, you may call [phone].    To schedule ordered imaging or an injection ordered by your provider:  Call [facility] Injection scheduling line: [phone]    MyChart available online at:  [email or web address]    Please call if any further questions or concerns ([phone]).  Clinic hours 8 am to 5 pm.    Return to clinic (call) if symptoms worsen or fail to improve.

## 2022-08-04 NOTE — PROGRESS NOTES
HISTORY OF PRESENT ILLNESS  Ms. Lester is a pleasant 62 year old year old female who presents to clinic today with ongoing neck pain  Kristal explains that she wants to review her MRI and discuss options  Neck pain radiates into arms      MEDICAL HISTORY  Patient Active Problem List   Diagnosis     Ulnocarpal impingement syndrome     Hallux valgus with bunions     High frequency hearing loss of both ears     Hyperlipidemia     Pineal gland cyst     Benign paroxysmal positional vertigo     SI (sacroiliac) joint dysfunction       Current Outpatient Medications   Medication Sig Dispense Refill     acetaminophen (TYLENOL) 325 MG tablet Take 2 tablets (650 mg) by mouth every 4 hours as needed for other (mild pain) 100 tablet 0     Calcium 200 MG TABS Take by mouth every morning        celecoxib (CELEBREX) 100 MG capsule Take 1 capsule (100 mg) by mouth 2 times daily 60 capsule 0     cyclobenzaprine (FLEXERIL) 5 MG tablet Take 1-2 tablets (5-10 mg) by mouth nightly as needed for muscle spasms 45 tablet 0     diphenhydrAMINE-APAP, sleep, (TYLENOL PM EXTRA STRENGTH PO) Take by mouth daily as needed        EPINEPHrine (EPIPEN/ADRENACLICK/OR ANY BX GENERIC EQUIV) 0.3 MG/0.3ML injection 2-pack Inject 0.3 mg into the muscle as needed for anaphylaxis       escitalopram (LEXAPRO) 10 MG tablet Take 10 mg by mouth every morning        estradiol (VIVELLE-DOT) 0.05 MG/24HR bi-weekly patch Place 1 patch onto the skin twice a week        LORazepam (ATIVAN) 0.5 MG tablet Take 0.5 mg by mouth At Bedtime       order for DME SI-loc belt 1 Units 0     oxyCODONE (ROXICODONE) 5 MG tablet Take 1-2 tablets (5-10 mg) by mouth every 4 hours as needed for pain (Moderate to Severe) 30 tablet 0     polyethylene glycol (MIRALAX) 17 g packet Take 17 g by mouth daily 7 packet 0     progesterone (PROMETRIUM) 100 MG capsule Take 100 mg by mouth At Bedtime        rosuvastatin (CRESTOR) 10 MG tablet Take 10 mg by mouth At Bedtime       senna-docusate  (SENOKOT-S/PERICOLACE) 8.6-50 MG tablet Take 1-2 tablets by mouth 2 times daily Take while on oral narcotics to prevent or treat constipation. 30 tablet 0     vitamin D3 (CHOLECALCIFEROL) 2000 units (50 mcg) tablet Take 1 tablet by mouth every morning          Allergies   Allergen Reactions     Bees Shortness Of Breath     Cellulitis of the leg       Family History   Problem Relation Age of Onset     Chronic Obstructive Pulmonary Disease Father      Heart Disease Father      Hyperlipidemia Father      Scleroderma Mother      Mental Illness Brother      Alzheimer Disease Maternal Grandmother      Parkinsonism Maternal Grandfather      Anesthesia Reaction No family hx of      Deep Vein Thrombosis (DVT) No family hx of      Social History     Socioeconomic History     Marital status:    Tobacco Use     Smoking status: Never Smoker     Smokeless tobacco: Never Used   Substance and Sexual Activity     Alcohol use: Not Currently     Drug use: Not Currently       Additional medical/Social/Surgical histories reviewed in [facility] and updated as appropriate.     REVIEW OF SYSTEMS (8/4/2022)  10 point ROS of systems including Constitutional, Eyes, Respiratory, Cardiovascular, Gastroenterology, Genitourinary, Integumentary, Musculoskeletal, Psychiatric, Allergic/Immunologic were all negative except for pertinent positives noted in my HPI.     PHYSICAL EXAM  VSS  Vital Signs: There were no vitals taken for this visit. Patient declined being weighed. There is no height or weight on file to calculate BMI.    General  - normal appearance, in no obvious distress  HEENT  - conjunctivae not injected, moist mucous membranes, normocephalic/atraumatic head, ears normal appearance, no lesions, mouth normal appearance, no scars, normal dentition and teeth present  CV  - normal peripheral perfusion  Pulm  - normal respiratory pattern, non-labored    Musculoskeletal - CERVICAL SPINE  - stance and posture: normal gait without limp, no  obvious leg length discrepancy, normal heel and toe walk, normal balance, slightly forward shoulders, head balanced normally on trunk  - inspection: normal bone and joint alignment, no obvious kyphosis  - palpation: no paravertebral or bony tenderness, except at base of neck and trapezius muscles  - ROM: normal and painless flexion, extension, left and right sidebending and rotation with some discomfort  - strength: upper extremities 5/5 in all planes  - special tests:  (+) spurlings    Neuro  - biceps, triceps, supinator DTRs 2+ bilaterally, no sensory or motor deficit, grossly normal coordination, normal muscle tone  Skin  - no ecchymosis, erythema, warmth, or induration, no obvious rash  Psych  - interactive, appropriate, normal mood and affect    ASSESSMENT & PLAN  61 yo female with cervical ddd, disc herniations, radicular pain, not resolved      I independently reviewed the following imaging studies:  Cervical MRI: shows ddd, disc herniations  Discussed and ordered MINISTERIO  rx given for flexeril and mobic  Ordered PT    Patient HAS not yet completed physical therapy for 4-6 weeks  Patient has been doing home exercise physical therapy program for this problem      Appropriate PPE was utilized for prevention of spread of Covid-19.  Santiago Gan MD, CAQSM     No

## 2022-08-08 ENCOUNTER — LAB REQUISITION (OUTPATIENT)
Dept: LAB | Facility: CLINIC | Age: 62
End: 2022-08-08

## 2022-08-08 DIAGNOSIS — Z11.3 ENCOUNTER FOR SCREENING FOR INFECTIONS WITH A PREDOMINANTLY SEXUAL MODE OF TRANSMISSION: ICD-10-CM

## 2022-08-08 PROCEDURE — 87389 HIV-1 AG W/HIV-1&-2 AB AG IA: CPT | Performed by: OBSTETRICS & GYNECOLOGY

## 2022-08-08 PROCEDURE — 86780 TREPONEMA PALLIDUM: CPT | Performed by: OBSTETRICS & GYNECOLOGY

## 2022-08-08 PROCEDURE — 87491 CHLMYD TRACH DNA AMP PROBE: CPT | Performed by: OBSTETRICS & GYNECOLOGY

## 2022-08-08 PROCEDURE — 86803 HEPATITIS C AB TEST: CPT | Performed by: OBSTETRICS & GYNECOLOGY

## 2022-08-08 PROCEDURE — 87591 N.GONORRHOEAE DNA AMP PROB: CPT | Performed by: OBSTETRICS & GYNECOLOGY

## 2022-08-08 PROCEDURE — 87340 HEPATITIS B SURFACE AG IA: CPT | Performed by: OBSTETRICS & GYNECOLOGY

## 2022-08-09 LAB
C TRACH DNA SPEC QL PROBE+SIG AMP: NEGATIVE
HBV SURFACE AG SERPL QL IA: NONREACTIVE
HCV AB SERPL QL IA: NONREACTIVE
HIV 1+2 AB+HIV1 P24 AG SERPL QL IA: NONREACTIVE
N GONORRHOEA DNA SPEC QL NAA+PROBE: NEGATIVE
T PALLIDUM AB SER QL: NONREACTIVE

## 2022-08-16 ENCOUNTER — ANCILLARY PROCEDURE (OUTPATIENT)
Dept: GENERAL RADIOLOGY | Facility: CLINIC | Age: 62
End: 2022-08-16
Attending: PREVENTIVE MEDICINE
Payer: COMMERCIAL

## 2022-08-16 ENCOUNTER — THERAPY VISIT (OUTPATIENT)
Dept: PHYSICAL THERAPY | Facility: CLINIC | Age: 62
End: 2022-08-16
Payer: COMMERCIAL

## 2022-08-16 DIAGNOSIS — M54.12 CERVICAL RADICULAR PAIN: ICD-10-CM

## 2022-08-16 DIAGNOSIS — M50.30 DDD (DEGENERATIVE DISC DISEASE), CERVICAL: ICD-10-CM

## 2022-08-16 DIAGNOSIS — M54.2 CERVICALGIA: ICD-10-CM

## 2022-08-16 PROCEDURE — 97110 THERAPEUTIC EXERCISES: CPT | Mod: GP | Performed by: PHYSICAL THERAPIST

## 2022-08-16 PROCEDURE — 62321 NJX INTERLAMINAR CRV/THRC: CPT | Performed by: RADIOLOGY

## 2022-08-16 PROCEDURE — 97161 PT EVAL LOW COMPLEX 20 MIN: CPT | Mod: GP | Performed by: PHYSICAL THERAPIST

## 2022-08-16 RX ORDER — BETAMETHASONE SODIUM PHOSPHATE AND BETAMETHASONE ACETATE 3; 3 MG/ML; MG/ML
3 INJECTION, SUSPENSION INTRA-ARTICULAR; INTRALESIONAL; INTRAMUSCULAR; SOFT TISSUE ONCE
Status: COMPLETED | OUTPATIENT
Start: 2022-08-16 | End: 2022-08-16

## 2022-08-16 RX ORDER — IOPAMIDOL 408 MG/ML
2 INJECTION, SOLUTION INTRATHECAL ONCE
Status: COMPLETED | OUTPATIENT
Start: 2022-08-16 | End: 2022-08-16

## 2022-08-16 RX ADMIN — BETAMETHASONE SODIUM PHOSPHATE AND BETAMETHASONE ACETATE 3 ML: 3; 3 INJECTION, SUSPENSION INTRA-ARTICULAR; INTRALESIONAL; INTRAMUSCULAR; SOFT TISSUE at 08:20

## 2022-08-16 RX ADMIN — IOPAMIDOL 2 ML: 408 INJECTION, SOLUTION INTRATHECAL at 08:20

## 2022-08-16 ASSESSMENT — ACTIVITIES OF DAILY LIVING (ADL)
HOS_ADL_SCORE(%): 82.35
WALKING_APPROXIMATELY_10_MINUTES: NO DIFFICULTY AT ALL
GOING_DOWN_1_FLIGHT_OF_STAIRS: NO DIFFICULTY AT ALL
WALKING_15_MINUTES_OR_GREATER: NO DIFFICULTY AT ALL
HOS_ADL_HIGHEST_POTENTIAL_SCORE: 68
STANDING_FOR_15_MINUTES: NO DIFFICULTY AT ALL
HOS_ADL_COUNT: 17
ROLLING_OVER_IN_BED: MODERATE DIFFICULTY
TWISTING/PIVOTING_ON_INVOLVED_LEG: MODERATE DIFFICULTY
WALKING_INITIALLY: SLIGHT DIFFICULTY
GOING_UP_1_FLIGHT_OF_STAIRS: NO DIFFICULTY AT ALL
RECREATIONAL_ACTIVITIES: MODERATE DIFFICULTY
SITTING_FOR_15_MINUTES: SLIGHT DIFFICULTY
DEEP_SQUATTING: NO DIFFICULTY AT ALL
WALKING_DOWN_STEEP_HILLS: NO DIFFICULTY AT ALL
STEPPING_UP_AND_DOWN_CURBS: NO DIFFICULTY AT ALL
PUTTING_ON_SOCKS_AND_SHOES: SLIGHT DIFFICULTY
HOW_WOULD_YOU_RATE_YOUR_CURRENT_LEVEL_OF_FUNCTION_DURING_YOUR_USUAL_ACTIVITIES_OF_DAILY_LIVING_FROM_0_TO_100_WITH_100_BEING_YOUR_LEVEL_OF_FUNCTION_PRIOR_TO_YOUR_HIP_PROBLEM_AND_0_BEING_THE_INABILITY_TO_PERFORM_ANY_OF_YOUR_USUAL_DAILY_ACTIVITIES?: 50
HOS_ADL_ITEM_SCORE_TOTAL: 56
GETTING_INTO_AND_OUT_OF_A_BATHTUB: SLIGHT DIFFICULTY
GETTING_INTO_AND_OUT_OF_AN_AVERAGE_CAR: SLIGHT DIFFICULTY
WALKING_UP_STEEP_HILLS: NO DIFFICULTY AT ALL
HEAVY_WORK: MODERATE DIFFICULTY
LIGHT_TO_MODERATE_WORK: SLIGHT DIFFICULTY

## 2022-08-16 NOTE — PROGRESS NOTES
: Kristal was seen in X-ray today for a cervical injection. Patient rated pain before procedure 5/10. After procedure patient rated pain 2/10.  Patient discharged home with a .

## 2022-08-16 NOTE — PROGRESS NOTES
Radiology Discharge Instructions Post Lumbar Puncture  AFTER YOU GO HOME  ü DO relax and take it easy for 24 hours; we suggest bed rest until the next morning, except for getting up to the bathroom  ü You may resume normal activity tomorrow  ü You may remove the bandage on your back in the evening or next morning  ü You may resume bathing the next day  ü Drink at least 4 glasses of extra fluid today if not on a fluid restriction.  ü DO NOT drive or operate machinery at home or at work for at least 24 hours.  CALL YOUR PRIMARY PHYSICIAN IF:  ü If you do start to leak a large amount of fluid from the puncture site, lie down flat on your back. Call your primary physician they will tell you if you need to return to the hospital.  ü You develop severe headache  ü You develop nausea or vomiting.  ü You develop a temperature of 101 degrees F or greater.  ADDITIONAL INSTRUCTIONS:  ü If you are taking a blood thinner, you may resume your medication at your regular dose today. Follow up with your physician to have your INR rechecked if indicated.  ü You may use over the counter pain reliever, do not use aspirin for 24 hours.  Contacts:  During business hours from 8 to 5 pm, you may call [phone] to reach a nurse advisor.  After hours call [facility] [phone]. Ask for the Radiologist on call. Someone is on call 24 hrs/day.  [facility] Toll Free Number   [phone]
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
## 2022-08-23 ENCOUNTER — THERAPY VISIT (OUTPATIENT)
Dept: PHYSICAL THERAPY | Facility: CLINIC | Age: 62
End: 2022-08-23
Payer: COMMERCIAL

## 2022-08-23 DIAGNOSIS — M54.2 CERVICALGIA: Primary | ICD-10-CM

## 2022-08-23 DIAGNOSIS — M53.3 SI (SACROILIAC) JOINT DYSFUNCTION: ICD-10-CM

## 2022-08-23 PROCEDURE — 97110 THERAPEUTIC EXERCISES: CPT | Mod: GP | Performed by: PHYSICAL THERAPIST

## 2022-08-30 ENCOUNTER — OFFICE VISIT (OUTPATIENT)
Dept: ORTHOPEDICS | Facility: CLINIC | Age: 62
End: 2022-08-30
Payer: COMMERCIAL

## 2022-08-30 DIAGNOSIS — M50.30 DDD (DEGENERATIVE DISC DISEASE), CERVICAL: ICD-10-CM

## 2022-08-30 DIAGNOSIS — M54.12 CERVICAL RADICULAR PAIN: ICD-10-CM

## 2022-08-30 DIAGNOSIS — M54.2 CERVICALGIA: Primary | ICD-10-CM

## 2022-08-30 PROCEDURE — 99214 OFFICE O/P EST MOD 30 MIN: CPT | Performed by: PREVENTIVE MEDICINE

## 2022-08-30 NOTE — PROGRESS NOTES
HISTORY OF PRESENT ILLNESS  Ms. Lester is a pleasant 62 year old year old female who presents to clinic today with   Kristal explains that she is following up after cervical injection. She states she has been having some residual tingling in her hands, but her overall pain has improved  Overall doing better  MEDICAL HISTORY  Patient Active Problem List   Diagnosis     Ulnocarpal impingement syndrome     Hallux valgus with bunions     High frequency hearing loss of both ears     Hyperlipidemia     Pineal gland cyst     Benign paroxysmal positional vertigo     SI (sacroiliac) joint dysfunction     Cervicalgia       Current Outpatient Medications   Medication Sig Dispense Refill     acetaminophen (TYLENOL) 325 MG tablet Take 2 tablets (650 mg) by mouth every 4 hours as needed for other (mild pain) 100 tablet 0     Calcium 200 MG TABS Take by mouth every morning        celecoxib (CELEBREX) 100 MG capsule TAKE 1 CAPSULE BY MOUTH TWICE A DAY 60 capsule 0     cyclobenzaprine (FLEXERIL) 5 MG tablet Take 1-2 tablets (5-10 mg) by mouth nightly as needed for muscle spasms 30 tablet 1     cyclobenzaprine (FLEXERIL) 5 MG tablet Take 1-2 tablets (5-10 mg) by mouth nightly as needed for muscle spasms 45 tablet 0     diphenhydrAMINE-APAP, sleep, (TYLENOL PM EXTRA STRENGTH PO) Take by mouth daily as needed        EPINEPHrine (EPIPEN/ADRENACLICK/OR ANY BX GENERIC EQUIV) 0.3 MG/0.3ML injection 2-pack Inject 0.3 mg into the muscle as needed for anaphylaxis       escitalopram (LEXAPRO) 10 MG tablet Take 10 mg by mouth every morning        estradiol (VIVELLE-DOT) 0.05 MG/24HR bi-weekly patch Place 1 patch onto the skin twice a week        LORazepam (ATIVAN) 0.5 MG tablet Take 0.5 mg by mouth At Bedtime       meloxicam (MOBIC) 15 MG tablet Take 1 tablet (15 mg) by mouth daily 30 tablet 0     order for DME SI-loc belt 1 Units 0     oxyCODONE (ROXICODONE) 5 MG tablet Take 1-2 tablets (5-10 mg) by mouth every 4 hours as needed for pain  (Moderate to Severe) 30 tablet 0     polyethylene glycol (MIRALAX) 17 g packet Take 17 g by mouth daily 7 packet 0     progesterone (PROMETRIUM) 100 MG capsule Take 100 mg by mouth At Bedtime        rosuvastatin (CRESTOR) 10 MG tablet Take 10 mg by mouth At Bedtime       senna-docusate (SENOKOT-S/PERICOLACE) 8.6-50 MG tablet Take 1-2 tablets by mouth 2 times daily Take while on oral narcotics to prevent or treat constipation. 30 tablet 0     vitamin D3 (CHOLECALCIFEROL) 2000 units (50 mcg) tablet Take 1 tablet by mouth every morning          Allergies   Allergen Reactions     Bees Shortness Of Breath     Cellulitis of the leg       Family History   Problem Relation Age of Onset     Chronic Obstructive Pulmonary Disease Father      Heart Disease Father      Hyperlipidemia Father      Scleroderma Mother      Mental Illness Brother      Alzheimer Disease Maternal Grandmother      Parkinsonism Maternal Grandfather      Anesthesia Reaction No family hx of      Deep Vein Thrombosis (DVT) No family hx of      Social History     Socioeconomic History     Marital status:    Tobacco Use     Smoking status: Never Smoker     Smokeless tobacco: Never Used   Substance and Sexual Activity     Alcohol use: Not Currently     Drug use: Not Currently       Additional medical/Social/Surgical histories reviewed in [facility] and updated as appropriate.     REVIEW OF SYSTEMS (8/30/2022)  10 point ROS of systems including Constitutional, Eyes, Respiratory, Cardiovascular, Gastroenterology, Genitourinary, Integumentary, Musculoskeletal, Psychiatric, Allergic/Immunologic were all negative except for pertinent positives noted in my HPI.     PHYSICAL EXAM  VSS  Vital Signs: There were no vitals taken for this visit. Patient declined being weighed. There is no height or weight on file to calculate BMI.    General  - normal appearance, in no obvious distress  HEENT  - conjunctivae not injected, moist mucous membranes, normocephalic/atraumatic  head, ears normal appearance, no lesions, mouth normal appearance, no scars, normal dentition and teeth present  CV  - normal peripheral perfusion  Pulm  - normal respiratory pattern, non-labored    Musculoskeletal - CERVICAL SPINE  - stance and posture: normal gait without limp, no obvious leg length discrepancy, normal heel and toe walk, normal balance, slightly forward shoulders, head balanced normally on trunk  - inspection: normal bone and joint alignment, no obvious kyphosis  - palpation: no paravertebral or bony tenderness, except at base of neck and trapezius muscles  - ROM: normal and painless flexion, extension, left and right sidebending and rotation with some discomfort  - strength: upper extremities 5/5 in all planes  - special tests:  (-) spurlings    Neuro  - biceps, triceps, supinator DTRs 2+ bilaterally, no sensory or motor deficit, grossly normal coordination, normal muscle tone  Skin  - no ecchymosis, erythema, warmth, or induration, no obvious rash  Psych  - interactive, appropriate, normal mood and affect    ASSESSMENT & PLAN  63 yo female with cervical ddd, disc herniations, radiuclar pain, improved    I independently reviewed the following imaging studies:  Cervical MRI: shows ddd, disc herniations  Can consider repeat MINISTERIO in 2-3 months  Cont. HEP  Cont. Medications as written    Patient has been doing home exercise physical therapy program for this problem      Appropriate PPE was utilized for prevention of spread of Covid-19.  Santiago Gan MD, [facility]

## 2022-08-30 NOTE — LETTER
8/30/2022         RE: Kristal Lester  [address] [place] [address]        Dear Colleague,    Thank you for referring your patient, Kristal Lester, to the [facility] [place]. Please see a copy of my visit note below.    HISTORY OF PRESENT ILLNESS  Ms. Lester is a pleasant 62 year old year old female who presents to clinic today with   Kristal explains that she is following up after cervical injection. She states she has been having some residual tingling in her hands, but her overall pain has improved  Overall doing better  MEDICAL HISTORY  Patient Active Problem List   Diagnosis     Ulnocarpal impingement syndrome     Hallux valgus with bunions     High frequency hearing loss of both ears     Hyperlipidemia     Pineal gland cyst     Benign paroxysmal positional vertigo     SI (sacroiliac) joint dysfunction     Cervicalgia       Current Outpatient Medications   Medication Sig Dispense Refill     acetaminophen (TYLENOL) 325 MG tablet Take 2 tablets (650 mg) by mouth every 4 hours as needed for other (mild pain) 100 tablet 0     Calcium 200 MG TABS Take by mouth every morning        celecoxib (CELEBREX) 100 MG capsule TAKE 1 CAPSULE BY MOUTH TWICE A DAY 60 capsule 0     cyclobenzaprine (FLEXERIL) 5 MG tablet Take 1-2 tablets (5-10 mg) by mouth nightly as needed for muscle spasms 30 tablet 1     cyclobenzaprine (FLEXERIL) 5 MG tablet Take 1-2 tablets (5-10 mg) by mouth nightly as needed for muscle spasms 45 tablet 0     diphenhydrAMINE-APAP, sleep, (TYLENOL PM EXTRA STRENGTH PO) Take by mouth daily as needed        EPINEPHrine (EPIPEN/ADRENACLICK/OR ANY BX GENERIC EQUIV) 0.3 MG/0.3ML injection 2-pack Inject 0.3 mg into the muscle as needed for anaphylaxis       escitalopram (LEXAPRO) 10 MG tablet Take 10 mg by mouth every morning        estradiol (VIVELLE-DOT) 0.05 MG/24HR bi-weekly patch Place 1 patch onto the skin twice a week        LORazepam (ATIVAN) 0.5 MG  tablet Take 0.5 mg by mouth At Bedtime       meloxicam (MOBIC) 15 MG tablet Take 1 tablet (15 mg) by mouth daily 30 tablet 0     order for DME SI-loc belt 1 Units 0     oxyCODONE (ROXICODONE) 5 MG tablet Take 1-2 tablets (5-10 mg) by mouth every 4 hours as needed for pain (Moderate to Severe) 30 tablet 0     polyethylene glycol (MIRALAX) 17 g packet Take 17 g by mouth daily 7 packet 0     progesterone (PROMETRIUM) 100 MG capsule Take 100 mg by mouth At Bedtime        rosuvastatin (CRESTOR) 10 MG tablet Take 10 mg by mouth At Bedtime       senna-docusate (SENOKOT-S/PERICOLACE) 8.6-50 MG tablet Take 1-2 tablets by mouth 2 times daily Take while on oral narcotics to prevent or treat constipation. 30 tablet 0     vitamin D3 (CHOLECALCIFEROL) 2000 units (50 mcg) tablet Take 1 tablet by mouth every morning          Allergies   Allergen Reactions     Bees Shortness Of Breath     Cellulitis of the leg       Family History   Problem Relation Age of Onset     Chronic Obstructive Pulmonary Disease Father      Heart Disease Father      Hyperlipidemia Father      Scleroderma Mother      Mental Illness Brother      Alzheimer Disease Maternal Grandmother      Parkinsonism Maternal Grandfather      Anesthesia Reaction No family hx of      Deep Vein Thrombosis (DVT) No family hx of      Social History     Socioeconomic History     Marital status:    Tobacco Use     Smoking status: Never Smoker     Smokeless tobacco: Never Used   Substance and Sexual Activity     Alcohol use: Not Currently     Drug use: Not Currently       Additional medical/Social/Surgical histories reviewed in [facility] and updated as appropriate.     REVIEW OF SYSTEMS (8/30/2022)  10 point ROS of systems including Constitutional, Eyes, Respiratory, Cardiovascular, Gastroenterology, Genitourinary, Integumentary, Musculoskeletal, Psychiatric, Allergic/Immunologic were all negative except for pertinent positives noted in my HPI.     PHYSICAL EXAM  VSS  Vital  Signs: There were no vitals taken for this visit. Patient declined being weighed. There is no height or weight on file to calculate BMI.    General  - normal appearance, in no obvious distress  HEENT  - conjunctivae not injected, moist mucous membranes, normocephalic/atraumatic head, ears normal appearance, no lesions, mouth normal appearance, no scars, normal dentition and teeth present  CV  - normal peripheral perfusion  Pulm  - normal respiratory pattern, non-labored    Musculoskeletal - CERVICAL SPINE  - stance and posture: normal gait without limp, no obvious leg length discrepancy, normal heel and toe walk, normal balance, slightly forward shoulders, head balanced normally on trunk  - inspection: normal bone and joint alignment, no obvious kyphosis  - palpation: no paravertebral or bony tenderness, except at base of neck and trapezius muscles  - ROM: normal and painless flexion, extension, left and right sidebending and rotation with some discomfort  - strength: upper extremities 5/5 in all planes  - special tests:  (-) spurlings    Neuro  - biceps, triceps, supinator DTRs 2+ bilaterally, no sensory or motor deficit, grossly normal coordination, normal muscle tone  Skin  - no ecchymosis, erythema, warmth, or induration, no obvious rash  Psych  - interactive, appropriate, normal mood and affect    ASSESSMENT & PLAN  63 yo female with cervical ddd, disc herniations, radiuclar pain, improved    I independently reviewed the following imaging studies:  Cervical MRI: shows ddd, disc herniations  Can consider repeat MINISTERIO in 2-3 months  Cont. HEP  Cont. Medications as written    Patient has been doing home exercise physical therapy program for this problem      Appropriate PPE was utilized for prevention of spread of Covid-19.  Santiago Gan MD, [facility]          Again, thank you for allowing me to participate in the care of your patient.        Sincerely,        Santiago Gan MD

## 2022-09-02 DIAGNOSIS — M50.30 DDD (DEGENERATIVE DISC DISEASE), CERVICAL: ICD-10-CM

## 2022-09-02 DIAGNOSIS — M54.12 CERVICAL RADICULAR PAIN: ICD-10-CM

## 2022-09-02 DIAGNOSIS — Z98.1 S/P SPINAL FUSION: Primary | ICD-10-CM

## 2022-09-02 NOTE — TELEPHONE ENCOUNTER
Prescription refill requested for:   Celecoxib (CELEBREX) 100 MG capsule   Last Written Prescription Date:  8/4/22  Last Fill Quantity: 60,   # refills: 0  Last Office Visit: 8/30/22  Future Office visit:         Prescription refill requested for:   meloxicam (MOBIC) 15 MG tablet     Last Written Prescription Date:  8/4/22  Last Fill Quantity: 30,   # refills: 0  Last Office Visit: 8/30/22  Future Office visit:                 Segundo Arzate EMT

## 2022-09-11 ENCOUNTER — HEALTH MAINTENANCE LETTER (OUTPATIENT)
Age: 62
End: 2022-09-11

## 2022-09-12 ENCOUNTER — VIRTUAL VISIT (OUTPATIENT)
Dept: ORTHOPEDICS | Facility: CLINIC | Age: 62
End: 2022-09-12
Payer: COMMERCIAL

## 2022-09-12 DIAGNOSIS — M54.12 CERVICAL RADICULAR PAIN: ICD-10-CM

## 2022-09-12 DIAGNOSIS — M54.2 CERVICALGIA: Primary | ICD-10-CM

## 2022-09-12 PROCEDURE — 99442 PR PHYSICIAN TELEPHONE EVALUATION 11-20 MIN: CPT | Mod: TEL | Performed by: PREVENTIVE MEDICINE

## 2022-09-12 NOTE — LETTER
9/12/2022         RE: Kristal Lester  [address] [place] [address]        Dear Colleague,    Thank you for referring your patient, Kristal Lester, to the [facility] [place]. Please see a copy of my visit note below.    Patient is a 62    year old who is being evaluated via a billable telephone visit.      What phone number would you like to be contacted at? CELL  How would you like to obtain your AVS? MYCHART        Subjective   Patient is a 62    year old who presents by phone call visit for the following:   Had cervical Ministerio last month  Doing better  Feels improvement overall  No concerns at this time    HPI       Review of Systems   Constitutional, HEENT, cardiovascular, pulmonary, gi and gu systems are negative, except as otherwise noted.      Objective           Vitals:  No vitals were obtained today due to virtual visit.    Physical Exam   healthy, alert and no distress  PSYCH: Alert and oriented times 3; coherent speech, normal   rate and volume, able to articulate logical thoughts, able   to abstract reason, no tangential thoughts, no hallucinations   or delusions  His affect is normal  RESP: No cough, no audible wheezing, able to talk in full sentences  Remainder of exam unable to be completed due to telephone visits    Assessment/Plan  63 yo female with cervical disc herniation, radicular pain, improved    I independently reviewed the following imaging studies and discussed with patient:  Cervical MRI: shows ddd, disc herniations  Discussed and ordered MINISTERIO  Cont. HEP  F/u 1 month          Phone call duration: 20 minutes  Phone call start: 745am  Phone call end: 805am  Dr Gan      Again, thank you for allowing me to participate in the care of your patient.        Sincerely,        Santiago Gan MD

## 2022-09-12 NOTE — PROGRESS NOTES
Patient is a 62    year old who is being evaluated via a billable telephone visit.      What phone number would you like to be contacted at? CELL  How would you like to obtain your AVS? VALENTIN        Subjective   Patient is a 62    year old who presents by phone call visit for the following:   Had cervical Cammie last month  Doing better  Feels improvement overall  No concerns at this time    HPI       Review of Systems   Constitutional, HEENT, cardiovascular, pulmonary, gi and gu systems are negative, except as otherwise noted.      Objective           Vitals:  No vitals were obtained today due to virtual visit.    Physical Exam   healthy, alert and no distress  PSYCH: Alert and oriented times 3; coherent speech, normal   rate and volume, able to articulate logical thoughts, able   to abstract reason, no tangential thoughts, no hallucinations   or delusions  His affect is normal  RESP: No cough, no audible wheezing, able to talk in full sentences  Remainder of exam unable to be completed due to telephone visits    Assessment/Plan  63 yo female with cervical disc herniation, radicular pain, improved    I independently reviewed the following imaging studies and discussed with patient:  Cervical MRI: shows ddd, disc herniations  Discussed and ordered CAMMIE  Cont. HEP  F/u 1 month          Phone call duration: 20 minutes  Phone call start: 745am  Phone call end: 805am  Dr Gan

## 2022-09-12 NOTE — LETTER
9/12/2022         RE: Kristal Lester  [address] [place] [address]        Dear Colleague,    Thank you for referring your patient, Kristal Lester, to the [facility] [place]. Please see a copy of my visit note below.    Patient is a 62    year old who is being evaluated via a billable telephone visit.      What phone number would you like to be contacted at? CELL  How would you like to obtain your AVS? MYCHART        Subjective   Patient is a 62    year old who presents by phone call visit for the following:   Had cervical Mniisterio last month  Doing better  Feels improvement overall  No concerns at this time    HPI       Review of Systems   Constitutional, HEENT, cardiovascular, pulmonary, gi and gu systems are negative, except as otherwise noted.      Objective           Vitals:  No vitals were obtained today due to virtual visit.    Physical Exam   healthy, alert and no distress  PSYCH: Alert and oriented times 3; coherent speech, normal   rate and volume, able to articulate logical thoughts, able   to abstract reason, no tangential thoughts, no hallucinations   or delusions  His affect is normal  RESP: No cough, no audible wheezing, able to talk in full sentences  Remainder of exam unable to be completed due to telephone visits    Assessment/Plan  63 yo female with cervical disc herniation, radicular pain, improved    I independently reviewed the following imaging studies and discussed with patient:  Cervical MRI: shows ddd, disc herniations  Discussed and ordered MINISTERIO  Cont. HEP  F/u 1 month          Phone call duration: 20 minutes  Phone call start: 745am  Phone call end: 805am  Dr Gan      Again, thank you for allowing me to participate in the care of your patient.        Sincerely,        Santiago Gan MD

## 2022-09-13 ENCOUNTER — TELEPHONE (OUTPATIENT)
Dept: ORTHOPEDICS | Facility: CLINIC | Age: 62
End: 2022-09-13

## 2022-09-13 NOTE — TELEPHONE ENCOUNTER
Left Voicemail (1st Attempt) for the patient to call back and schedule the following:    Appointment type: return   Provider: dr. borges   Return date: 10/12/2022   Specialty phone number: [phone]   Additonal Notes: 1 month follow up     Delia franz Procedure   Orthopedics, Podiatry, Sports Medicine, ENT/Eye Specialties  [facility]   [phone]

## 2022-09-15 RX ORDER — CELECOXIB 100 MG/1
CAPSULE ORAL
Qty: 60 CAPSULE | Refills: 0 | Status: SHIPPED | OUTPATIENT
Start: 2022-09-15

## 2022-09-15 RX ORDER — MELOXICAM 15 MG/1
TABLET ORAL
Qty: 30 TABLET | Refills: 0 | Status: SHIPPED | OUTPATIENT
Start: 2022-09-15 | End: 2024-02-22

## 2022-09-16 ENCOUNTER — ANCILLARY PROCEDURE (OUTPATIENT)
Dept: GENERAL RADIOLOGY | Facility: CLINIC | Age: 62
End: 2022-09-16
Attending: ORTHOPAEDIC SURGERY
Payer: COMMERCIAL

## 2022-09-16 ENCOUNTER — OFFICE VISIT (OUTPATIENT)
Dept: NEUROSURGERY | Facility: CLINIC | Age: 62
End: 2022-09-16
Payer: COMMERCIAL

## 2022-09-16 VITALS
HEIGHT: 65 IN | WEIGHT: 165 LBS | DIASTOLIC BLOOD PRESSURE: 84 MMHG | BODY MASS INDEX: 27.49 KG/M2 | SYSTOLIC BLOOD PRESSURE: 141 MMHG

## 2022-09-16 DIAGNOSIS — M53.3 CHRONIC LEFT SI JOINT PAIN: Primary | ICD-10-CM

## 2022-09-16 DIAGNOSIS — Z98.1 S/P SPINAL FUSION: ICD-10-CM

## 2022-09-16 DIAGNOSIS — G89.29 CHRONIC LEFT SI JOINT PAIN: Primary | ICD-10-CM

## 2022-09-16 PROCEDURE — 72190 X-RAY EXAM OF PELVIS: CPT | Mod: GC | Performed by: RADIOLOGY

## 2022-09-16 PROCEDURE — 99213 OFFICE O/P EST LOW 20 MIN: CPT | Performed by: ORTHOPAEDIC SURGERY

## 2022-09-16 RX ORDER — GABAPENTIN 100 MG/1
CAPSULE ORAL
COMMUNITY
Start: 2022-09-13

## 2022-09-16 ASSESSMENT — PAIN SCALES - GENERAL: PAINLEVEL: MILD PAIN (2)

## 2022-09-16 NOTE — PROGRESS NOTES
Spine Surgery Return Clinic Visit      Chief Complaint:   RECHECK (6th week post op SI joint fusion. Will need same day XR. Patient not able to /make it Tuesday appt tmr, but able to see Dr. Urbina in [place] as she lives/closer to . RN scheduled. )      Interval HPI:  Symptom Profile Including: location of symptoms, onset, severity, exacerbating/alleviating factors, previous treatments:        Kristal Lester is a 62 year old female who returns today in follow-up.  She says she is doing quite well.  She thinks that the left SI joint injection helped her quite a bit.  She feels like the right revision surgery also helped her quite a bit.    Overall she is pleased with her progress.  She had a left trochanteric injection as well with me and is continuing physical therapy and overall is pleased with her progress.            Past Medical History:     Past Medical History:   Diagnosis Date     Benign paroxysmal positional vertigo, left 7/10/2019     HLD (hyperlipidemia)      PVC's (premature ventricular contractions)      SI (sacroiliac) joint dysfunction             Past Surgical History:     Past Surgical History:   Procedure Laterality Date     Bunion surgery  2017      SECTION  5 years ago, 35 years ago     ELBOW SURGERY Right      MAMMOPLASTY AUGMENTATION  23 years ago     OPTICAL TRACKING SYSTEM FUSION REVISION SACRAL ILIAC N/A 2021    Procedure: Revision right sacroiliac joint fusion with iliosacral screws and open posterior joint debridement and grafting, use of O-Arm/stealth navigation, allograft, local autograft, and bone morphogenic;  Surgeon: Mac Urbina MD;  Location: [facility] OR     OPTICAL TRACKING SYSTEM FUSION SACRAL ILIAC Right 2019    Procedure: Right Minimal Invasive Sacral Iliac Joint Fusion;  Surgeon: Mac Urbina MD;  Location:  OR            Social History:     Social History     Tobacco Use     Smoking status: Never Smoker     Smokeless  tobacco: Never Used   Substance Use Topics     Alcohol use: Not Currently            Family History:     Family History   Problem Relation Age of Onset     Chronic Obstructive Pulmonary Disease Father      Heart Disease Father      Hyperlipidemia Father      Scleroderma Mother      Mental Illness Brother      Alzheimer Disease Maternal Grandmother      Parkinsonism Maternal Grandfather      Anesthesia Reaction No family hx of      Deep Vein Thrombosis (DVT) No family hx of             Allergies:     Allergies   Allergen Reactions     Bees Shortness Of Breath     Cellulitis of the leg            Medications:     Current Outpatient Medications   Medication     celecoxib (CELEBREX) 100 MG capsule     cyclobenzaprine (FLEXERIL) 5 MG tablet     EPINEPHrine (EPIPEN/ADRENACLICK/OR ANY BX GENERIC EQUIV) 0.3 MG/0.3ML injection 2-pack     escitalopram (LEXAPRO) 10 MG tablet     gabapentin (NEURONTIN) 100 MG capsule     LORazepam (ATIVAN) 0.5 MG tablet     meloxicam (MOBIC) 15 MG tablet     rosuvastatin (CRESTOR) 10 MG tablet     vitamin D3 (CHOLECALCIFEROL) 2000 units (50 mcg) tablet     acetaminophen (TYLENOL) 325 MG tablet     Calcium 200 MG TABS     cyclobenzaprine (FLEXERIL) 5 MG tablet     diphenhydrAMINE-APAP, sleep, (TYLENOL PM EXTRA STRENGTH PO)     estradiol (VIVELLE-DOT) 0.05 MG/24HR bi-weekly patch     order for DME     oxyCODONE (ROXICODONE) 5 MG tablet     polyethylene glycol (MIRALAX) 17 g packet     progesterone (PROMETRIUM) 100 MG capsule     senna-docusate (SENOKOT-S/PERICOLACE) 8.6-50 MG tablet     Current Facility-Administered Medications   Medication     lidocaine (PF) (XYLOCAINE) 1 % injection 5 mL     triamcinolone (KENALOG-40) injection 40 mg             Review of Systems:   A focused musculoskeletal and neurologic ROS was performed with pertinent positives and negatives noted in the HPI.  Additional systems were also reviewed and are documented at the bottom of the note.         Physical Exam:  "  Vitals: BP (!) 141/84 (BP Location: Right arm, Patient Position: Sitting, Cuff Size: Adult Regular)   Ht 1.645 m (5' 4.75\")   Wt 74.8 kg (165 lb)   BMI 27.67 kg/m    Musculoskeletal, Neurologic, and Spine:            Lumbar Spine:    Appearance - No gross stepoffs or deformities    Motor -     L2-3: Hip flexion 5/5 R and 5/5 L strength          L3/4:  Knee extension R 5/5 and L 5/5 strength         L4/5:  Foot dorsiflexion R 5/5 L 5/5 and               S1:  Plantarflexion/Peroneal Muscles  R 5/5 and L 5/5 strength    Sensation: intact to light touch L3-S1 distribution BLE        Hip Exam:  No pain with hip log roll and no tenderness over the greater trochanters.    Alignment:  Patient stands with a neutral standing sagittal balance.           Imaging:   We ordered and independently reviewed new radiographs at this clinic visit. The results were discussed with the patient. Findings include:    Pelvic radiographs today show no change in alignment.  I did compare this against her previous images from earlier in 2022, which showed no change.  I do note some lucency around the transiliosacral fixation which goes into the left ilium       Assessment and Plan:     62 year old female with significantly improved right SI joint, some mild residual left SI joint discomfort, but significantly improved overall.    We discussed that if the symptoms return and she wants to repeat an injection she will contact the office and let us know and we would order a CT-guided left SI injection.  Otherwise if she continues to do well we will just follow-up as needed.  I congratulated her on her progress.           Respectfully,  Mac Urbina MD  Spine Surgery  [facility]    "

## 2022-09-16 NOTE — NURSING NOTE
"Kristal Lester's goals for this visit include:   Chief Complaint   Patient presents with     RECHECK     6th week post op SI joint fusion. Will need same day XR. Patient not able to   make it Tuesday appt tmr, but able to see Dr. Urbina in [place] as she lives  closer to . RN scheduled.        She requests these members of her care team be copied on today's visit information: yes    PCP: [facility]    Referring Provider:  No referring provider defined for this encounter.    BP (!) 141/84 (BP Location: Right arm, Patient Position: Sitting, Cuff Size: Adult Regular)   Ht 1.645 m (5' 4.75\")   Wt 74.8 kg (165 lb)   BMI 27.67 kg/m      Do you need any medication refills at today's visit? No  MCKENNA Vasquez, KAL ([facility])      "

## 2022-09-16 NOTE — LETTER
2022         RE: Kristal Lester  [address] [place] [address]        Dear Colleague,    Thank you for referring your patient, Kristal Lester, to the [facility] [place]. Please see a copy of my visit note below.    Spine Surgery Return Clinic Visit      Chief Complaint:   RECHECK (6th week post op SI joint fusion. Will need same day XR. Patient not able to /make it Tuesday appt tmr, but able to see Dr. Urbina in [place] as she lives/closer to . RN scheduled. )      Interval HPI:  Symptom Profile Including: location of symptoms, onset, severity, exacerbating/alleviating factors, previous treatments:        Kristal Lester is a 62 year old female who returns today in follow-up.  She says she is doing quite well.  She thinks that the left SI joint injection helped her quite a bit.  She feels like the right revision surgery also helped her quite a bit.    Overall she is pleased with her progress.  She had a left trochanteric injection as well with me and is continuing physical therapy and overall is pleased with her progress.            Past Medical History:     Past Medical History:   Diagnosis Date     Benign paroxysmal positional vertigo, left 7/10/2019     HLD (hyperlipidemia)      PVC's (premature ventricular contractions)      SI (sacroiliac) joint dysfunction             Past Surgical History:     Past Surgical History:   Procedure Laterality Date     Bunion surgery  2017      SECTION  5 years ago, 35 years ago     ELBOW SURGERY Right      MAMMOPLASTY AUGMENTATION  23 years ago     OPTICAL TRACKING SYSTEM FUSION REVISION SACRAL ILIAC N/A 2021    Procedure: Revision right sacroiliac joint fusion with iliosacral screws and open posterior joint debridement and grafting, use of O-Arm/stealth navigation, allograft, local autograft, and bone morphogenic;  Surgeon: Mac Urbina MD;  Location:  OR     OPTICAL TRACKING SYSTEM  FUSION SACRAL ILIAC Right 8/22/2019    Procedure: Right Minimal Invasive Sacral Iliac Joint Fusion;  Surgeon: Mac Urbina MD;  Location: [facility] OR            Social History:     Social History     Tobacco Use     Smoking status: Never Smoker     Smokeless tobacco: Never Used   Substance Use Topics     Alcohol use: Not Currently            Family History:     Family History   Problem Relation Age of Onset     Chronic Obstructive Pulmonary Disease Father      Heart Disease Father      Hyperlipidemia Father      Scleroderma Mother      Mental Illness Brother      Alzheimer Disease Maternal Grandmother      Parkinsonism Maternal Grandfather      Anesthesia Reaction No family hx of      Deep Vein Thrombosis (DVT) No family hx of             Allergies:     Allergies   Allergen Reactions     Bees Shortness Of Breath     Cellulitis of the leg            Medications:     Current Outpatient Medications   Medication     celecoxib (CELEBREX) 100 MG capsule     cyclobenzaprine (FLEXERIL) 5 MG tablet     EPINEPHrine (EPIPEN/ADRENACLICK/OR ANY BX GENERIC EQUIV) 0.3 MG/0.3ML injection 2-pack     escitalopram (LEXAPRO) 10 MG tablet     gabapentin (NEURONTIN) 100 MG capsule     LORazepam (ATIVAN) 0.5 MG tablet     meloxicam (MOBIC) 15 MG tablet     rosuvastatin (CRESTOR) 10 MG tablet     vitamin D3 (CHOLECALCIFEROL) 2000 units (50 mcg) tablet     acetaminophen (TYLENOL) 325 MG tablet     Calcium 200 MG TABS     cyclobenzaprine (FLEXERIL) 5 MG tablet     diphenhydrAMINE-APAP, sleep, (TYLENOL PM EXTRA STRENGTH PO)     estradiol (VIVELLE-DOT) 0.05 MG/24HR bi-weekly patch     order for DME     oxyCODONE (ROXICODONE) 5 MG tablet     polyethylene glycol (MIRALAX) 17 g packet     progesterone (PROMETRIUM) 100 MG capsule     senna-docusate (SENOKOT-S/PERICOLACE) 8.6-50 MG tablet     Current Facility-Administered Medications   Medication     lidocaine (PF) (XYLOCAINE) 1 % injection 5 mL     triamcinolone (KENALOG-40) injection 40  "mg             Review of Systems:   A focused musculoskeletal and neurologic ROS was performed with pertinent positives and negatives noted in the HPI.  Additional systems were also reviewed and are documented at the bottom of the note.         Physical Exam:   Vitals: BP (!) 141/84 (BP Location: Right arm, Patient Position: Sitting, Cuff Size: Adult Regular)   Ht 1.645 m (5' 4.75\")   Wt 74.8 kg (165 lb)   BMI 27.67 kg/m    Musculoskeletal, Neurologic, and Spine:            Lumbar Spine:    Appearance - No gross stepoffs or deformities    Motor -     L2-3: Hip flexion 5/5 R and 5/5 L strength          L3/4:  Knee extension R 5/5 and L 5/5 strength         L4/5:  Foot dorsiflexion R 5/5 L 5/5 and               S1:  Plantarflexion/Peroneal Muscles  R 5/5 and L 5/5 strength    Sensation: intact to light touch L3-S1 distribution BLE        Hip Exam:  No pain with hip log roll and no tenderness over the greater trochanters.    Alignment:  Patient stands with a neutral standing sagittal balance.           Imaging:   We ordered and independently reviewed new radiographs at this clinic visit. The results were discussed with the patient. Findings include:    Pelvic radiographs today show no change in alignment.  I did compare this against her previous images from earlier in 2022, which showed no change.  I do note some lucency around the transiliosacral fixation which goes into the left ilium       Assessment and Plan:     62 year old female with significantly improved right SI joint, some mild residual left SI joint discomfort, but significantly improved overall.    We discussed that if the symptoms return and she wants to repeat an injection she will contact the office and let us know and we would order a CT-guided left SI injection.  Otherwise if she continues to do well we will just follow-up as needed.  I congratulated her on her progress.           Respectfully,  Mac Urbina MD  Spine Surgery  [facility]        Again, thank you for allowing me to participate in the care of your patient.        Sincerely,        Mac Urbina MD

## 2022-09-30 ENCOUNTER — MYC MEDICAL ADVICE (OUTPATIENT)
Dept: ORTHOPEDICS | Facility: CLINIC | Age: 62
End: 2022-09-30

## 2022-11-16 ENCOUNTER — OFFICE VISIT (OUTPATIENT)
Dept: ORTHOPEDICS | Facility: CLINIC | Age: 62
End: 2022-11-16
Payer: COMMERCIAL

## 2022-11-16 ENCOUNTER — ANCILLARY PROCEDURE (OUTPATIENT)
Dept: GENERAL RADIOLOGY | Facility: CLINIC | Age: 62
End: 2022-11-16
Attending: PREVENTIVE MEDICINE
Payer: COMMERCIAL
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
## 2022-11-16 DIAGNOSIS — S80.01XA CONTUSION OF RIGHT KNEE, INITIAL ENCOUNTER: ICD-10-CM

## 2022-11-16 DIAGNOSIS — M50.20 CERVICAL DISC HERNIATION: ICD-10-CM

## 2022-11-16 DIAGNOSIS — M54.12 CERVICAL RADICULAR PAIN: ICD-10-CM

## 2022-11-16 DIAGNOSIS — M54.16 LUMBAR RADICULAR PAIN: ICD-10-CM

## 2022-11-16 DIAGNOSIS — S90.31XA CONTUSION OF RIGHT FOOT, INITIAL ENCOUNTER: ICD-10-CM

## 2022-11-16 DIAGNOSIS — M54.12 CERVICAL RADICULAR PAIN: Primary | ICD-10-CM

## 2022-11-16 PROCEDURE — 99214 OFFICE O/P EST MOD 30 MIN: CPT | Performed by: PREVENTIVE MEDICINE

## 2022-11-16 PROCEDURE — 72040 X-RAY EXAM NECK SPINE 2-3 VW: CPT | Mod: GC | Performed by: RADIOLOGY
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
## 2022-11-29 NOTE — PROGRESS NOTES
DISCHARGE SUMMARY    Kristal Lester was seen 2 times for evaluation and treatment.  Patient did not return for further treatment and current status is unknown.  Due to short treatment duration, no objective or functional changes were made.  Please see goal flow sheet from episode noted date below and initial evaluation for further information.  Patient is discharged from therapy and therapy episode is resolved as of 11/29/22.      Linked Episodes   Type: Episode: Status: Noted: Resolved: Last update: Updated by:   PHYSICAL THERAPY neck / low back 8/16/22 Active 8/16/2022 8/23/2022  2:55 PM Logan Sawyer, PT      Comments:

## 2023-01-23 ENCOUNTER — HEALTH MAINTENANCE LETTER (OUTPATIENT)
Age: 63
End: 2023-01-23

## 2023-01-31 ENCOUNTER — ANCILLARY PROCEDURE (OUTPATIENT)
Dept: GENERAL RADIOLOGY | Facility: CLINIC | Age: 63
End: 2023-01-31
Attending: PREVENTIVE MEDICINE
Payer: COMMERCIAL
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
## 2023-01-31 DIAGNOSIS — M50.20 CERVICAL DISC HERNIATION: ICD-10-CM

## 2023-01-31 DIAGNOSIS — M54.12 CERVICAL RADICULAR PAIN: ICD-10-CM

## 2023-01-31 PROCEDURE — 62321 NJX INTERLAMINAR CRV/THRC: CPT | Performed by: RADIOLOGY

## 2023-01-31 RX ORDER — IOPAMIDOL 408 MG/ML
2 INJECTION, SOLUTION INTRATHECAL ONCE
Status: COMPLETED | OUTPATIENT
Start: 2023-01-31 | End: 2023-01-31

## 2023-01-31 RX ORDER — BETAMETHASONE SODIUM PHOSPHATE AND BETAMETHASONE ACETATE 3; 3 MG/ML; MG/ML
18 INJECTION, SUSPENSION INTRA-ARTICULAR; INTRALESIONAL; INTRAMUSCULAR; SOFT TISSUE ONCE
Status: COMPLETED | OUTPATIENT
Start: 2023-01-31 | End: 2023-01-31

## 2023-01-31 RX ORDER — LIDOCAINE HYDROCHLORIDE 10 MG/ML
5 INJECTION, SOLUTION EPIDURAL; INFILTRATION; INTRACAUDAL; PERINEURAL ONCE
Status: COMPLETED | OUTPATIENT
Start: 2023-01-31 | End: 2023-01-31

## 2023-01-31 RX ADMIN — BETAMETHASONE SODIUM PHOSPHATE AND BETAMETHASONE ACETATE 18 MG: 3; 3 INJECTION, SUSPENSION INTRA-ARTICULAR; INTRALESIONAL; INTRAMUSCULAR; SOFT TISSUE at 14:16

## 2023-01-31 RX ADMIN — LIDOCAINE HYDROCHLORIDE 5 ML: 10 INJECTION, SOLUTION EPIDURAL; INFILTRATION; INTRACAUDAL; PERINEURAL at 14:15

## 2023-01-31 RX ADMIN — IOPAMIDOL 2 ML: 408 INJECTION, SOLUTION INTRATHECAL at 14:15

## 2023-01-31 NOTE — DISCHARGE SUMMARY
AFTER YOU GO HOME    ? DO relax; minimize your activity for 24 hours  ? You may resume normal activity tomorrow  ? You may remove the bandage in the evening or next morning  ? You may resume bathing the next day  ? Drink at least 4 extra glasses of fluid today if not on fluid restrictions  ? DO NOT drive or operate machinery at home or at work for at least 24 hours      VISIT THE EMERGENCY ROOM OR URGENT CARE IF:    ? There is redness or swelling at the injection site  ? There is discharge from the injection site  ? You develop a temperature of 101  F or greater      ADDITIONAL INSTRUCTIONS:     ? You may resume your Coumadin or other blood thinner at your regular dose today.  Follow up with your physician to have your INR rechecked if indicated.  ? If you gain no relief from the injection after two (2) weeks, follow-up with your provider for your options.        Contacts:    During business hours from 8 to 5 pm, you may call [phone] to reach a nurse advisor at [facility].  After hours, call [facility]  [phone].  Ask for the Radiologist on-call.  Someone is on-call 24 hrs/day.  [facility] Toll Free Number   [phone]

## 2023-01-31 NOTE — DISCHARGE SUMMARY
Kristal was seen in X-ray today for a Cervical epidural injection. Patient rated pain before procedure 4/10. After procedure patient rated pain 2/10.   This pain level is acceptable to patient. Patient discharged home with .

## 2023-02-13 ENCOUNTER — LAB REQUISITION (OUTPATIENT)
Dept: LAB | Facility: CLINIC | Age: 63
End: 2023-02-13
Payer: COMMERCIAL

## 2023-02-13 DIAGNOSIS — R30.0 DYSURIA: ICD-10-CM

## 2023-02-13 PROCEDURE — 87086 URINE CULTURE/COLONY COUNT: CPT | Mod: ORL | Performed by: ADVANCED PRACTICE MIDWIFE

## 2023-02-15 LAB — BACTERIA UR CULT: NORMAL

## 2023-02-24 ENCOUNTER — LAB REQUISITION (OUTPATIENT)
Dept: LAB | Facility: CLINIC | Age: 63
End: 2023-02-24
Payer: COMMERCIAL

## 2023-02-24 DIAGNOSIS — Z11.3 ENCOUNTER FOR SCREENING FOR INFECTIONS WITH A PREDOMINANTLY SEXUAL MODE OF TRANSMISSION: ICD-10-CM

## 2023-02-24 LAB — T VAGINALIS DNA SPEC QL NAA+PROBE: NOT DETECTED

## 2023-02-24 PROCEDURE — 86803 HEPATITIS C AB TEST: CPT | Mod: ORL | Performed by: OBSTETRICS & GYNECOLOGY

## 2023-02-24 PROCEDURE — 87389 HIV-1 AG W/HIV-1&-2 AB AG IA: CPT | Mod: ORL | Performed by: OBSTETRICS & GYNECOLOGY

## 2023-02-24 PROCEDURE — 87491 CHLMYD TRACH DNA AMP PROBE: CPT | Mod: ORL | Performed by: OBSTETRICS & GYNECOLOGY

## 2023-02-24 PROCEDURE — 87340 HEPATITIS B SURFACE AG IA: CPT | Mod: ORL | Performed by: OBSTETRICS & GYNECOLOGY

## 2023-02-24 PROCEDURE — 86780 TREPONEMA PALLIDUM: CPT | Mod: ORL | Performed by: OBSTETRICS & GYNECOLOGY

## 2023-02-24 PROCEDURE — 87591 N.GONORRHOEAE DNA AMP PROB: CPT | Mod: ORL | Performed by: OBSTETRICS & GYNECOLOGY

## 2023-02-24 PROCEDURE — 87661 TRICHOMONAS VAGINALIS AMPLIF: CPT | Mod: ORL | Performed by: OBSTETRICS & GYNECOLOGY

## 2023-03-01 ENCOUNTER — VIRTUAL VISIT (OUTPATIENT)
Dept: ORTHOPEDICS | Facility: CLINIC | Age: 63
End: 2023-03-01
Payer: COMMERCIAL

## 2023-03-01 ENCOUNTER — TELEPHONE (OUTPATIENT)
Dept: ORTHOPEDICS | Facility: CLINIC | Age: 63
End: 2023-03-01
Payer: COMMERCIAL

## 2023-03-01 DIAGNOSIS — M50.30 DDD (DEGENERATIVE DISC DISEASE), CERVICAL: ICD-10-CM

## 2023-03-01 DIAGNOSIS — M54.12 CERVICAL RADICULAR PAIN: Primary | ICD-10-CM

## 2023-03-01 DIAGNOSIS — R20.0 NUMBNESS AND TINGLING IN BOTH HANDS: ICD-10-CM

## 2023-03-01 DIAGNOSIS — R20.2 NUMBNESS AND TINGLING IN BOTH HANDS: ICD-10-CM

## 2023-03-01 PROCEDURE — 99213 OFFICE O/P EST LOW 20 MIN: CPT | Mod: TEL | Performed by: PREVENTIVE MEDICINE

## 2023-03-01 NOTE — PROGRESS NOTES
Patient is a   62  year old who is being evaluated via a billable telephone visit.      What phone number would you like to be contacted at? CELL  How would you like to obtain your AVS? VALENTIN        Subjective   Patient is a   62  year old who presents by phone call visit for the following:   F/u for cervical radicular pain  Worse  Had cervical injection about 1 month ago that only improved her symptoms for about a week or so  Both her hands are numb and continues to have pain in her neck and arms  This is worse than it was last fall    HPI       Review of Systems   Constitutional, HEENT, cardiovascular, pulmonary, gi and gu systems are negative, except as otherwise noted.      Objective           Vitals:  No vitals were obtained today due to virtual visit.    Physical Exam   healthy, alert and no distress  PSYCH: Alert and oriented times 3; coherent speech, normal   rate and volume, able to articulate logical thoughts, able   to abstract reason, no tangential thoughts, no hallucinations   or delusions  His affect is normal  RESP: No cough, no audible wheezing, able to talk in full sentences  Remainder of exam unable to be completed due to telephone visits    Assessment/Plan  63 yo female with cervical ddd,disc herniations, radicular pain, worse    I independently reviewed the following imaging studies and discussed with patient:  Cervical MRI from 2022: shows ddd, disc herniations  Discussed and ordered new cervical MRI due to worsened symptoms  And worsened numbness and tingling in arms and hands  Cont. HEP  Consider referral to Dr Urbina for further discussion regarding her cervical spine issues after MRI        Phone call duration: 20 minutes  Phone call start: 1040am  Phone call end: 1100am  Dr Gan

## 2023-03-01 NOTE — TELEPHONE ENCOUNTER
Left Voicemail (1st Attempt) for the patient to call back and schedule the following:    Appointment type: return  Provider: dr. borges   Return date: couple days after mri   Specialty phone number: [phone]   Additional appointment(s) needed: mri prior   Additonal Notes: follow up to discuss results of mri.    Delia franz Procedure   Orthopedics, Podiatry, Sports Medicine, Ent ,Eye , Audiology, Adult Endocrine & Diabetes, Nutrition & Medication Therapy Management Specialties   [facility]   
- - -

## 2023-03-01 NOTE — LETTER
3/1/2023         RE: Kristal Lester  [address] [place] [address]        Dear Colleague,    Thank you for referring your patient, Kristal Lester, to the [facility] [place]. Please see a copy of my visit note below.    Patient is a   62  year old who is being evaluated via a billable telephone visit.      What phone number would you like to be contacted at? CELL  How would you like to obtain your AVS? MYCHART        Subjective   Patient is a   62  year old who presents by phone call visit for the following:   F/u for cervical radicular pain  Worse  Had cervical injection about 1 month ago that only improved her symptoms for about a week or so  Both her hands are numb and continues to have pain in her neck and arms  This is worse than it was last fall    HPI       Review of Systems   Constitutional, HEENT, cardiovascular, pulmonary, gi and gu systems are negative, except as otherwise noted.      Objective           Vitals:  No vitals were obtained today due to virtual visit.    Physical Exam   healthy, alert and no distress  PSYCH: Alert and oriented times 3; coherent speech, normal   rate and volume, able to articulate logical thoughts, able   to abstract reason, no tangential thoughts, no hallucinations   or delusions  His affect is normal  RESP: No cough, no audible wheezing, able to talk in full sentences  Remainder of exam unable to be completed due to telephone visits    Assessment/Plan  63 yo female with cervical ddd,disc herniations, radicular pain, worse    I independently reviewed the following imaging studies and discussed with patient:  Cervical MRI from 2022: shows ddd, disc herniations  Discussed and ordered new cervical MRI due to worsened symptoms  And worsened numbness and tingling in arms and hands  Cont. HEP  Consider referral to Dr Urbina for further discussion regarding her cervical spine issues after MRI        Phone call duration: 20  minutes  Phone call start: 1040am  Phone call end: 1100am  Dr Gan      Again, thank you for allowing me to participate in the care of your patient.        Sincerely,        Santiago Gan MD

## 2023-03-01 NOTE — LETTER
3/1/2023         RE: Kristal Lester  [address] [place] [address]        Dear Colleague,    Thank you for referring your patient, Kristal Lester, to the [facility] [place]. Please see a copy of my visit note below.    Patient is a   62  year old who is being evaluated via a billable telephone visit.      What phone number would you like to be contacted at? CELL  How would you like to obtain your AVS? MYCHART        Subjective   Patient is a   62  year old who presents by phone call visit for the following:   F/u for cervical radicular pain  Worse  Had cervical injection about 1 month ago that only improved her symptoms for about a week or so  Both her hands are numb and continues to have pain in her neck and arms  This is worse than it was last fall    HPI       Review of Systems   Constitutional, HEENT, cardiovascular, pulmonary, gi and gu systems are negative, except as otherwise noted.      Objective           Vitals:  No vitals were obtained today due to virtual visit.    Physical Exam   healthy, alert and no distress  PSYCH: Alert and oriented times 3; coherent speech, normal   rate and volume, able to articulate logical thoughts, able   to abstract reason, no tangential thoughts, no hallucinations   or delusions  His affect is normal  RESP: No cough, no audible wheezing, able to talk in full sentences  Remainder of exam unable to be completed due to telephone visits    Assessment/Plan  61 yo female with cervical ddd,disc herniations, radicular pain, worse    I independently reviewed the following imaging studies and discussed with patient:  Cervical MRI from 2022: shows ddd, disc herniations  Discussed and ordered new cervical MRI due to worsened symptoms  And worsened numbness and tingling in arms and hands  Cont. HEP  Consider referral to Dr Urbina for further discussion regarding her cervical spine issues after MRI        Phone call duration: 20  minutes  Phone call start: 1040am  Phone call end: 1100am  Dr Gan      Again, thank you for allowing me to participate in the care of your patient.        Sincerely,        Santiago Gan MD

## 2023-03-02 ENCOUNTER — TELEPHONE (OUTPATIENT)
Dept: ORTHOPEDICS | Facility: CLINIC | Age: 63
End: 2023-03-02
Payer: COMMERCIAL

## 2023-03-02 NOTE — TELEPHONE ENCOUNTER
When was epidural done?     Delia franz Procedure   Orthopedics, Podiatry, Sports Medicine, Ent ,Eye , Audiology, Adult Endocrine & Diabetes, Nutrition & Medication Therapy Management Specialties   [facility]

## 2023-03-02 NOTE — TELEPHONE ENCOUNTER
3/2 Called and spoke to patient, appointment is currently scheduled.     Delia franz Procedure   Orthopedics, Podiatry, Sports Medicine, Ent ,Eye , Audiology, Adult Endocrine & Diabetes, Nutrition & Medication Therapy Management Specialties   [facility]

## 2023-03-16 ENCOUNTER — ANCILLARY PROCEDURE (OUTPATIENT)
Dept: MRI IMAGING | Facility: CLINIC | Age: 63
End: 2023-03-16
Attending: PREVENTIVE MEDICINE
Payer: COMMERCIAL

## 2023-03-16 DIAGNOSIS — M50.30 DDD (DEGENERATIVE DISC DISEASE), CERVICAL: ICD-10-CM

## 2023-03-16 DIAGNOSIS — R20.2 NUMBNESS AND TINGLING IN BOTH HANDS: ICD-10-CM

## 2023-03-16 DIAGNOSIS — R20.0 NUMBNESS AND TINGLING IN BOTH HANDS: ICD-10-CM

## 2023-03-16 DIAGNOSIS — M54.12 CERVICAL RADICULAR PAIN: ICD-10-CM

## 2023-03-16 PROCEDURE — 72141 MRI NECK SPINE W/O DYE: CPT | Mod: GC | Performed by: STUDENT IN AN ORGANIZED HEALTH CARE EDUCATION/TRAINING PROGRAM

## 2023-03-20 ENCOUNTER — VIRTUAL VISIT (OUTPATIENT)
Dept: ORTHOPEDICS | Facility: CLINIC | Age: 63
End: 2023-03-20
Payer: COMMERCIAL

## 2023-03-20 DIAGNOSIS — M54.12 CERVICAL RADICULAR PAIN: ICD-10-CM

## 2023-03-20 DIAGNOSIS — R20.0 NUMBNESS AND TINGLING IN BOTH HANDS: ICD-10-CM

## 2023-03-20 DIAGNOSIS — M50.30 DDD (DEGENERATIVE DISC DISEASE), CERVICAL: Primary | ICD-10-CM

## 2023-03-20 DIAGNOSIS — R20.2 NUMBNESS AND TINGLING IN BOTH HANDS: ICD-10-CM

## 2023-03-20 PROCEDURE — 99213 OFFICE O/P EST LOW 20 MIN: CPT | Mod: TEL | Performed by: PREVENTIVE MEDICINE

## 2023-03-20 NOTE — PROGRESS NOTES
Patient is a   62  year old who is being evaluated via a billable telephone visit.      What phone number would you like to be contacted at? CELL  How would you like to obtain your AVS? VALENTIN        Subjective   Patient is a  62   year old who presents by phone call visit for the following:     HPI   F.u/ for cervical pain, radicular pain  Still having symptoms  Wants to discuss MRI and possible treatments    Review of Systems   Constitutional, HEENT, cardiovascular, pulmonary, gi and gu systems are negative, except as otherwise noted.      Objective           Vitals:  No vitals were obtained today due to virtual visit.    Physical Exam   healthy, alert and no distress  PSYCH: Alert and oriented times 3; coherent speech, normal   rate and volume, able to articulate logical thoughts, able   to abstract reason, no tangential thoughts, no hallucinations   or delusions  His affect is normal  RESP: No cough, no audible wheezing, able to talk in full sentences  Remainder of exam unable to be completed due to telephone visits    Assessment/Plan  61 yo female with cervical ddd, disc herniations, radicular pain, not resolved    I independently reviewed the following imaging studies and discussed with patient:  Cervical MRI; shows ddd, disc herniations  Discussed and ordered cervical MINISTERIO  Cont. HEP and medications          Phone call duration: 20 minutes  Phone call start: 110pm  Phone call end: 130pm  Dr Gan

## 2023-03-20 NOTE — LETTER
3/20/2023         RE: Kristal Lester  [address] [place] [address]        Dear Colleague,    Thank you for referring your patient, Kristal Letser, to the [facility] [place]. Please see a copy of my visit note below.    Patient is a   62  year old who is being evaluated via a billable telephone visit.      What phone number would you like to be contacted at? CELL  How would you like to obtain your AVS? MYCHART        Subjective   Patient is a  62   year old who presents by phone call visit for the following:     HPI   F.u/ for cervical pain, radicular pain  Still having symptoms  Wants to discuss MRI and possible treatments    Review of Systems   Constitutional, HEENT, cardiovascular, pulmonary, gi and gu systems are negative, except as otherwise noted.      Objective           Vitals:  No vitals were obtained today due to virtual visit.    Physical Exam   healthy, alert and no distress  PSYCH: Alert and oriented times 3; coherent speech, normal   rate and volume, able to articulate logical thoughts, able   to abstract reason, no tangential thoughts, no hallucinations   or delusions  His affect is normal  RESP: No cough, no audible wheezing, able to talk in full sentences  Remainder of exam unable to be completed due to telephone visits    Assessment/Plan  63 yo female with cervical ddd, disc herniations, radicular pain, not resolved    I independently reviewed the following imaging studies and discussed with patient:  Cervical MRI; shows ddd, disc herniations  Discussed and ordered cervical MINISTERIO  Cont. HEP and medications          Phone call duration: 20 minutes  Phone call start: 110pm  Phone call end: 130pm  Dr Gan      Again, thank you for allowing me to participate in the care of your patient.        Sincerely,        Santiago Gan MD

## 2023-03-20 NOTE — LETTER
3/20/2023         RE: Kristal Lester  [address] [place] [address]        Dear Colleague,    Thank you for referring your patient, Kristal Lester, to the [facility] [place]. Please see a copy of my visit note below.    Patient is a   62  year old who is being evaluated via a billable telephone visit.      What phone number would you like to be contacted at? CELL  How would you like to obtain your AVS? MYCHART        Subjective   Patient is a  62   year old who presents by phone call visit for the following:     HPI   F.u/ for cervical pain, radicular pain  Still having symptoms  Wants to discuss MRI and possible treatments    Review of Systems   Constitutional, HEENT, cardiovascular, pulmonary, gi and gu systems are negative, except as otherwise noted.      Objective           Vitals:  No vitals were obtained today due to virtual visit.    Physical Exam   healthy, alert and no distress  PSYCH: Alert and oriented times 3; coherent speech, normal   rate and volume, able to articulate logical thoughts, able   to abstract reason, no tangential thoughts, no hallucinations   or delusions  His affect is normal  RESP: No cough, no audible wheezing, able to talk in full sentences  Remainder of exam unable to be completed due to telephone visits    Assessment/Plan  63 yo female with cervical ddd, disc herniations, radicular pain, not resolved    I independently reviewed the following imaging studies and discussed with patient:  Cervical MRI; shows ddd, disc herniations  Discussed and ordered cervical MINISTERIO  Cont. HEP and medications          Phone call duration: 20 minutes  Phone call start: 110pm  Phone call end: 130pm  Dr Gan      Again, thank you for allowing me to participate in the care of your patient.        Sincerely,        Santiago Gan MD

## 2023-05-12 ENCOUNTER — ANCILLARY PROCEDURE (OUTPATIENT)
Dept: GENERAL RADIOLOGY | Facility: CLINIC | Age: 63
End: 2023-05-12
Attending: PREVENTIVE MEDICINE
Payer: COMMERCIAL

## 2023-05-12 DIAGNOSIS — R20.0 NUMBNESS AND TINGLING IN BOTH HANDS: ICD-10-CM

## 2023-05-12 DIAGNOSIS — M50.30 DDD (DEGENERATIVE DISC DISEASE), CERVICAL: ICD-10-CM

## 2023-05-12 DIAGNOSIS — M54.12 CERVICAL RADICULAR PAIN: ICD-10-CM

## 2023-05-12 DIAGNOSIS — R20.2 NUMBNESS AND TINGLING IN BOTH HANDS: ICD-10-CM

## 2023-05-12 PROCEDURE — 62321 NJX INTERLAMINAR CRV/THRC: CPT | Performed by: RADIOLOGY

## 2023-05-12 RX ORDER — IOPAMIDOL 408 MG/ML
2 INJECTION, SOLUTION INTRATHECAL ONCE
Status: COMPLETED | OUTPATIENT
Start: 2023-05-12 | End: 2023-05-12

## 2023-05-12 RX ORDER — BETAMETHASONE SODIUM PHOSPHATE AND BETAMETHASONE ACETATE 3; 3 MG/ML; MG/ML
18 INJECTION, SUSPENSION INTRA-ARTICULAR; INTRALESIONAL; INTRAMUSCULAR; SOFT TISSUE ONCE
Status: COMPLETED | OUTPATIENT
Start: 2023-05-12 | End: 2023-05-12

## 2023-05-12 RX ADMIN — IOPAMIDOL 2 ML: 408 INJECTION, SOLUTION INTRATHECAL at 13:25

## 2023-05-12 RX ADMIN — BETAMETHASONE SODIUM PHOSPHATE AND BETAMETHASONE ACETATE 18 MG: 3; 3 INJECTION, SUSPENSION INTRA-ARTICULAR; INTRALESIONAL; INTRAMUSCULAR; SOFT TISSUE at 13:26

## 2023-05-12 NOTE — PROGRESS NOTES
kelly was seen in X-ray today for a cervical epidural injection. Patient rated pain before procedure 8/10. After procedure patient rated pain 6/10.   This pain level is acceptable to patient. Patient discharged home with .

## 2023-05-12 NOTE — PROGRESS NOTES
Radiology Discharge Instructions Post Lumbar Puncture  AFTER YOU GO HOME  ? DO relax and take it easy for 24 hours; we suggest bed rest until the next morning, except for getting up to the bathroom  ? You may resume normal activity tomorrow  ? You may remove the bandage on your back in the evening or next morning  ? You may resume bathing the next day  ? Drink at least 4 glasses of extra fluid today if not on a fluid restriction.  ? DO NOT drive or operate machinery at home or at work for at least 24 hours.    CALL YOUR PRIMARY PHYSICIAN IF:  ? If you do start to leak a large amount of fluid from the puncture site, lie down flat on your back. Call your primary physician they will tell you if you need to return to the hospital.  ? You develop severe headache  ? You develop nausea or vomiting.  ? You develop a temperature of 101 degrees F or greater.    ADDITIONAL INSTRUCTIONS:   ? If you are taking a blood thinner, you may resume your medication at your regular dose today.  Follow up with your physician to have your INR rechecked if indicated.  ? You may use over the counter pain reliever, do not use aspirin for 24 hours.    Contacts:  During business hours from 8 to 5 pm, you may call [phone] to reach a nurse advisor.  After hours call [facility] [phone].  Ask for the Radiologist on call.  Someone is on call 24 hrs/day.  [facility] Toll Free Number   [phone]

## 2023-05-25 ENCOUNTER — VIRTUAL VISIT (OUTPATIENT)
Dept: ORTHOPEDICS | Facility: CLINIC | Age: 63
End: 2023-05-25
Payer: COMMERCIAL

## 2023-05-25 DIAGNOSIS — M50.20 CERVICAL DISC HERNIATION: ICD-10-CM

## 2023-05-25 DIAGNOSIS — M54.2 CERVICALGIA: Primary | ICD-10-CM

## 2023-05-25 PROCEDURE — 99442 PR PHYSICIAN TELEPHONE EVALUATION 11-20 MIN: CPT | Performed by: PREVENTIVE MEDICINE

## 2023-05-25 NOTE — LETTER
5/25/2023         RE: Kristal Lester  [address] [place] [address]        Dear Colleague,    Thank you for referring your patient, Kristal Lester, to the [facility] [place]. Please see a copy of my visit note below.    Patient is a  62   year old who is being evaluated via a billable telephone visit.      What phone number would you like to be contacted at? CELL  How would you like to obtain your AVS? MYCHART        Subjective   Patient is a  62   year old who presents by phone call visit for the following:     HPI   F/u for cervical injection  Doing much better  Symptoms of neck pain and arms pain and hand pain resolved  Since MINISTERIO        Review of Systems   Constitutional, HEENT, cardiovascular, pulmonary, gi and gu systems are negative, except as otherwise noted.      Objective           Vitals:  No vitals were obtained today due to virtual visit.    Physical Exam   healthy, alert and no distress  PSYCH: Alert and oriented times 3; coherent speech, normal   rate and volume, able to articulate logical thoughts, able   to abstract reason, no tangential thoughts, no hallucinations   or delusions  His affect is normal  RESP: No cough, no audible wheezing, able to talk in full sentences  Remainder of exam unable to be completed due to telephone visits    Assessment/Plan  61 yo female with cervical ddd, disc herniations, radicular pain    I independently reviewed the following imaging studies and discussed with patient:  Cervical MRI : shows ddd, disc herniations  Discussed can repeat injection this summer if worsens  Cont. HEP  And nsaids PRN  F/u PRN          Phone call duration: 20 minutes  Phone call start: 900am  Phone call end: 920am  Dr Gan      Again, thank you for allowing me to participate in the care of your patient.        Sincerely,        Santiago Gan MD

## 2023-05-25 NOTE — PROGRESS NOTES
Patient is a  62   year old who is being evaluated via a billable telephone visit.      What phone number would you like to be contacted at? CELL  How would you like to obtain your AVS? VALENTIN        Subjective   Patient is a  62   year old who presents by phone call visit for the following:     HPI   F/u for cervical injection  Doing much better  Symptoms of neck pain and arms pain and hand pain resolved  Since MINISTERIO        Review of Systems   Constitutional, HEENT, cardiovascular, pulmonary, gi and gu systems are negative, except as otherwise noted.      Objective           Vitals:  No vitals were obtained today due to virtual visit.    Physical Exam   healthy, alert and no distress  PSYCH: Alert and oriented times 3; coherent speech, normal   rate and volume, able to articulate logical thoughts, able   to abstract reason, no tangential thoughts, no hallucinations   or delusions  His affect is normal  RESP: No cough, no audible wheezing, able to talk in full sentences  Remainder of exam unable to be completed due to telephone visits    Assessment/Plan  61 yo female with cervical ddd, disc herniations, radicular pain    I independently reviewed the following imaging studies and discussed with patient:  Cervical MRI : shows ddd, disc herniations  Discussed can repeat injection this summer if worsens  Cont. HEP  And nsaids PRN  F/u PRN          Phone call duration: 20 minutes  Phone call start: 900am  Phone call end: 920am  Dr Gan

## 2023-05-25 NOTE — LETTER
5/25/2023         RE: Kristal Lester  [address] [place] [address]        Dear Colleague,    Thank you for referring your patient, Kristal Lester, to the [facility] [place]. Please see a copy of my visit note below.    Patient is a  62   year old who is being evaluated via a billable telephone visit.      What phone number would you like to be contacted at? CELL  How would you like to obtain your AVS? MYCHART        Subjective   Patient is a  62   year old who presents by phone call visit for the following:     HPI   F/u for cervical injection  Doing much better  Symptoms of neck pain and arms pain and hand pain resolved  Since MINISTERIO        Review of Systems   Constitutional, HEENT, cardiovascular, pulmonary, gi and gu systems are negative, except as otherwise noted.      Objective           Vitals:  No vitals were obtained today due to virtual visit.    Physical Exam   healthy, alert and no distress  PSYCH: Alert and oriented times 3; coherent speech, normal   rate and volume, able to articulate logical thoughts, able   to abstract reason, no tangential thoughts, no hallucinations   or delusions  His affect is normal  RESP: No cough, no audible wheezing, able to talk in full sentences  Remainder of exam unable to be completed due to telephone visits    Assessment/Plan  61 yo female with cervical ddd, disc herniations, radicular pain    I independently reviewed the following imaging studies and discussed with patient:  Cervical MRI : shows ddd, disc herniations  Discussed can repeat injection this summer if worsens  Cont. HEP  And nsaids PRN  F/u PRN          Phone call duration: 20 minutes  Phone call start: 900am  Phone call end: 920am  Dr Gan      Again, thank you for allowing me to participate in the care of your patient.        Sincerely,        Santiago Gan MD

## 2023-06-03 ENCOUNTER — HEALTH MAINTENANCE LETTER (OUTPATIENT)
Age: 63
End: 2023-06-03

## 2024-02-22 ENCOUNTER — VIRTUAL VISIT (OUTPATIENT)
Dept: ORTHOPEDICS | Facility: CLINIC | Age: 64
End: 2024-02-22
Payer: COMMERCIAL

## 2024-02-22 ENCOUNTER — MYC REFILL (OUTPATIENT)
Dept: ORTHOPEDICS | Facility: CLINIC | Age: 64
End: 2024-02-22

## 2024-02-22 DIAGNOSIS — M54.12 CERVICAL RADICULAR PAIN: ICD-10-CM

## 2024-02-22 DIAGNOSIS — M54.2 CERVICALGIA: Primary | ICD-10-CM

## 2024-02-22 DIAGNOSIS — M25.522 CHRONIC ELBOW PAIN, LEFT: ICD-10-CM

## 2024-02-22 DIAGNOSIS — M50.30 DDD (DEGENERATIVE DISC DISEASE), CERVICAL: ICD-10-CM

## 2024-02-22 DIAGNOSIS — M50.20 CERVICAL DISC HERNIATION: ICD-10-CM

## 2024-02-22 DIAGNOSIS — G89.29 CHRONIC ELBOW PAIN, LEFT: ICD-10-CM

## 2024-02-22 PROCEDURE — 99442 PR PHYSICIAN TELEPHONE EVALUATION 11-20 MIN: CPT | Performed by: PREVENTIVE MEDICINE

## 2024-02-22 NOTE — LETTER
2/22/2024         RE: Kristal Lester  [address] [place] [address]        Dear Colleague,    Thank you for referring your patient, Kristal Lester, to the [facility] [place]. Please see a copy of my visit note below.    Patient is a   63  year old who is being evaluated via a billable telephone visit.      What phone number would you like to be contacted at? CELL  How would you like to obtain your AVS? MYCHART        Subjective   Patient is a   63  year old who presents by phone call visit for the following:     HPI   Follow up for cervical radicular pain  Had cervical MINISTERIO last year and it improved her symptoms of neck pain and radicular pain over 50% for over 3 months  Worsened over the past 6 months and wants to discuss further treatment options    Review of Systems   Constitutional, HEENT, cardiovascular, pulmonary, gi and gu systems are negative, except as otherwise noted.      Objective           Vitals:  No vitals were obtained today due to virtual visit.    Physical Exam   healthy, alert, and no distress  PSYCH: Alert and oriented times 3; coherent speech, normal   rate and volume, able to articulate logical thoughts, able   to abstract reason, no tangential thoughts, no hallucinations   or delusions  His affect is normal  RESP: No cough, no audible wheezing, able to talk in full sentences  Remainder of exam unable to be completed due to telephone visits    Assessment/Plan  62 yo female with cervical ddd, disc herniations, radicular pain, worse    I independently reviewed the following imaging studies and discussed with patient:  Cervical MRI from a year ago: shows ddd, disc herniations  Due to worsened symptoms will order a new cervical MRI and followup after          Phone call duration: 20 minutes  Phone call start: 850am  Phone call end: 910am  Dr Gan      Again, thank you for allowing me to participate in the care of your patient.         Sincerely,        Santiago Gan MD

## 2024-02-22 NOTE — LETTER
2/22/2024         RE: Kristal Lester  [address] [place] [address]        Dear Colleague,    Thank you for referring your patient, Kristal Lester, to the [facility] [place]. Please see a copy of my visit note below.    Patient is a   63  year old who is being evaluated via a billable telephone visit.      What phone number would you like to be contacted at? CELL  How would you like to obtain your AVS? MYCHART        Subjective   Patient is a   63  year old who presents by phone call visit for the following:     HPI   Follow up for cervical radicular pain  Had cervical MINISTERIO last year and it improved her symptoms of neck pain and radicular pain over 50% for over 3 months  Worsened over the past 6 months and wants to discuss further treatment options    Review of Systems   Constitutional, HEENT, cardiovascular, pulmonary, gi and gu systems are negative, except as otherwise noted.      Objective           Vitals:  No vitals were obtained today due to virtual visit.    Physical Exam   healthy, alert, and no distress  PSYCH: Alert and oriented times 3; coherent speech, normal   rate and volume, able to articulate logical thoughts, able   to abstract reason, no tangential thoughts, no hallucinations   or delusions  His affect is normal  RESP: No cough, no audible wheezing, able to talk in full sentences  Remainder of exam unable to be completed due to telephone visits    Assessment/Plan  64 yo female with cervical ddd, disc herniations, radicular pain, worse    I independently reviewed the following imaging studies and discussed with patient:  Cervical MRI from a year ago: shows ddd, disc herniations  Due to worsened symptoms will order a new cervical MRI and followup after          Phone call duration: 20 minutes  Phone call start: 850am  Phone call end: 910am  Dr Gan      Again, thank you for allowing me to participate in the care of your patient.         Sincerely,        Santiago Gan MD

## 2024-02-22 NOTE — PROGRESS NOTES
Patient is a   63  year old who is being evaluated via a billable telephone visit.      What phone number would you like to be contacted at? CELL  How would you like to obtain your AVS? VALENTIN        Subjective   Patient is a   63  year old who presents by phone call visit for the following:     HPI   Follow up for cervical radicular pain  Had cervical MINISTERIO last year and it improved her symptoms of neck pain and radicular pain over 50% for over 3 months  Worsened over the past 6 months and wants to discuss further treatment options    Review of Systems   Constitutional, HEENT, cardiovascular, pulmonary, gi and gu systems are negative, except as otherwise noted.      Objective           Vitals:  No vitals were obtained today due to virtual visit.    Physical Exam   healthy, alert, and no distress  PSYCH: Alert and oriented times 3; coherent speech, normal   rate and volume, able to articulate logical thoughts, able   to abstract reason, no tangential thoughts, no hallucinations   or delusions  His affect is normal  RESP: No cough, no audible wheezing, able to talk in full sentences  Remainder of exam unable to be completed due to telephone visits    Assessment/Plan  62 yo female with cervical ddd, disc herniations, radicular pain, worse    I independently reviewed the following imaging studies and discussed with patient:  Cervical MRI from a year ago: shows ddd, disc herniations  Due to worsened symptoms will order a new cervical MRI and followup after          Phone call duration: 20 minutes  Phone call start: 850am  Phone call end: 910am  Dr Gan

## 2024-02-23 RX ORDER — CYCLOBENZAPRINE HCL 5 MG
5-10 TABLET ORAL
Qty: 45 TABLET | Refills: 0 | Status: SHIPPED | OUTPATIENT
Start: 2024-02-23 | End: 2024-03-18

## 2024-02-23 RX ORDER — MELOXICAM 15 MG/1
15 TABLET ORAL DAILY
Qty: 30 TABLET | Refills: 0 | Status: SHIPPED | OUTPATIENT
Start: 2024-02-23

## 2024-02-23 NOTE — TELEPHONE ENCOUNTER
Prescription refill requested for:   meloxicam (MOBIC) 15 MG tablet       Last Written Prescription Date:  9/15/22  Last Fill Quantity: 30,   # refills: 0  Last Office Visit: 2/23/24  Future Office visit:           Alfred Mary ATC

## 2024-02-23 NOTE — TELEPHONE ENCOUNTER
Prescription refill requested for:   cyclobenzaprine (FLEXERIL) 5 MG tablet       Last Written Prescription Date:  8/4/22  Last Fill Quantity: 30,   # refills: 1  Last Office Visit: 2/22/24  Future Office visit:           Alfred Mary ATC

## 2024-03-13 ENCOUNTER — ANCILLARY PROCEDURE (OUTPATIENT)
Dept: MRI IMAGING | Facility: CLINIC | Age: 64
End: 2024-03-13
Attending: PREVENTIVE MEDICINE
Payer: COMMERCIAL

## 2024-03-13 DIAGNOSIS — M54.2 CERVICALGIA: ICD-10-CM

## 2024-03-13 DIAGNOSIS — M50.20 CERVICAL DISC HERNIATION: ICD-10-CM

## 2024-03-13 DIAGNOSIS — M50.30 DDD (DEGENERATIVE DISC DISEASE), CERVICAL: ICD-10-CM

## 2024-03-13 PROCEDURE — 72141 MRI NECK SPINE W/O DYE: CPT | Mod: GC | Performed by: STUDENT IN AN ORGANIZED HEALTH CARE EDUCATION/TRAINING PROGRAM

## 2024-03-18 ENCOUNTER — VIRTUAL VISIT (OUTPATIENT)
Dept: ORTHOPEDICS | Facility: CLINIC | Age: 64
End: 2024-03-18
Payer: COMMERCIAL

## 2024-03-18 DIAGNOSIS — M25.522 CHRONIC ELBOW PAIN, LEFT: ICD-10-CM

## 2024-03-18 DIAGNOSIS — M54.12 CERVICAL RADICULAR PAIN: ICD-10-CM

## 2024-03-18 DIAGNOSIS — G89.29 CHRONIC ELBOW PAIN, LEFT: ICD-10-CM

## 2024-03-18 PROCEDURE — 99442 PR PHYSICIAN TELEPHONE EVALUATION 11-20 MIN: CPT | Performed by: PREVENTIVE MEDICINE

## 2024-03-18 RX ORDER — CYCLOBENZAPRINE HCL 5 MG
TABLET ORAL
Qty: 45 TABLET | OUTPATIENT
Start: 2024-03-18

## 2024-03-18 RX ORDER — CYCLOBENZAPRINE HCL 5 MG
5-10 TABLET ORAL
Qty: 45 TABLET | Refills: 3 | Status: SHIPPED | OUTPATIENT
Start: 2024-03-18

## 2024-03-18 RX ORDER — MELOXICAM 15 MG/1
15 TABLET ORAL DAILY
Qty: 30 TABLET | Refills: 2 | Status: SHIPPED | OUTPATIENT
Start: 2024-03-18

## 2024-03-18 NOTE — PROGRESS NOTES
Patient is a   63  year old who is being evaluated via a billable telephone visit.      What phone number would you like to be contacted at? CELL  How would you like to obtain your AVS? VALENTIN        Subjective   Patient is a   63  year old who presents by phone call visit for the following:     HPI   Followup after cervical MRI  Doing a little better with use of medications  Wants to discuss MRI         Review of Systems   Constitutional, HEENT, cardiovascular, pulmonary, gi and gu systems are negative, except as otherwise noted.      Objective           Vitals:  No vitals were obtained today due to virtual visit.    Physical Exam   healthy, alert, and no distress  PSYCH: Alert and oriented times 3; coherent speech, normal   rate and volume, able to articulate logical thoughts, able   to abstract reason, no tangential thoughts, no hallucinations   or delusions  His affect is normal  RESP: No cough, no audible wheezing, able to talk in full sentences  Remainder of exam unable to be completed due to telephone visits    Assessment/Plan  62 yo female with cervical ddd, disc herniations, radiculopathy, not resovled    I independently reviewed the following imaging studies and discussed with patient:  Cervical mRI shows ddd, disc herniations  Discussed and can consider cervical MINISTERIO  Cont. Medications and refilled mobic and flexeril  Followup PRN      Phone call duration: 20 minutes  Phone call start: 1020am  Phone call end: 1040am  Dr Gan

## 2024-03-18 NOTE — LETTER
3/18/2024         RE: Kristal Lester  [address] [place] [address]        Dear Colleague,    Thank you for referring your patient, Kristal Lester, to the [facility] [place]. Please see a copy of my visit note below.    Patient is a   63  year old who is being evaluated via a billable telephone visit.      What phone number would you like to be contacted at? CELL  How would you like to obtain your AVS? MYCHART        Subjective   Patient is a   63  year old who presents by phone call visit for the following:     HPI   Followup after cervical MRI  Doing a little better with use of medications  Wants to discuss MRI         Review of Systems   Constitutional, HEENT, cardiovascular, pulmonary, gi and gu systems are negative, except as otherwise noted.      Objective           Vitals:  No vitals were obtained today due to virtual visit.    Physical Exam   healthy, alert, and no distress  PSYCH: Alert and oriented times 3; coherent speech, normal   rate and volume, able to articulate logical thoughts, able   to abstract reason, no tangential thoughts, no hallucinations   or delusions  His affect is normal  RESP: No cough, no audible wheezing, able to talk in full sentences  Remainder of exam unable to be completed due to telephone visits    Assessment/Plan  64 yo female with cervical ddd, disc herniations, radiculopathy, not resovled    I independently reviewed the following imaging studies and discussed with patient:  Cervical mRI shows ddd, disc herniations  Discussed and can consider cervical MINISTERIO  Cont. Medications and refilled mobic and flexeril  Followup PRN      Phone call duration: 20 minutes  Phone call start: 1020am  Phone call end: 1040am  Dr Gan      Again, thank you for allowing me to participate in the care of your patient.        Sincerely,        Santiago Gan MD

## 2024-03-18 NOTE — LETTER
3/18/2024         RE: Kristal Lester  [address] [place] [address]        Dear Colleague,    Thank you for referring your patient, Kristal Lester, to the [facility] [place]. Please see a copy of my visit note below.    Patient is a   63  year old who is being evaluated via a billable telephone visit.      What phone number would you like to be contacted at? CELL  How would you like to obtain your AVS? MYCHART        Subjective   Patient is a   63  year old who presents by phone call visit for the following:     HPI   Followup after cervical MRI  Doing a little better with use of medications  Wants to discuss MRI         Review of Systems   Constitutional, HEENT, cardiovascular, pulmonary, gi and gu systems are negative, except as otherwise noted.      Objective           Vitals:  No vitals were obtained today due to virtual visit.    Physical Exam   healthy, alert, and no distress  PSYCH: Alert and oriented times 3; coherent speech, normal   rate and volume, able to articulate logical thoughts, able   to abstract reason, no tangential thoughts, no hallucinations   or delusions  His affect is normal  RESP: No cough, no audible wheezing, able to talk in full sentences  Remainder of exam unable to be completed due to telephone visits    Assessment/Plan  64 yo female with cervical ddd, disc herniations, radiculopathy, not resovled    I independently reviewed the following imaging studies and discussed with patient:  Cervical mRI shows ddd, disc herniations  Discussed and can consider cervical MINISTERIO  Cont. Medications and refilled mobic and flexeril  Followup PRN      Phone call duration: 20 minutes  Phone call start: 1020am  Phone call end: 1040am  Dr Gan      Again, thank you for allowing me to participate in the care of your patient.        Sincerely,        Santiago Gan MD

## 2024-07-13 ENCOUNTER — HEALTH MAINTENANCE LETTER (OUTPATIENT)
Age: 64
End: 2024-07-13

## 2024-09-05 ENCOUNTER — ANCILLARY PROCEDURE (OUTPATIENT)
Dept: CT IMAGING | Facility: CLINIC | Age: 64
End: 2024-09-05
Attending: ORTHOPAEDIC SURGERY
Payer: COMMERCIAL

## 2024-09-05 DIAGNOSIS — M53.3 SI (SACROILIAC) JOINT DYSFUNCTION: ICD-10-CM

## 2024-09-05 DIAGNOSIS — M53.3 CHRONIC LEFT SI JOINT PAIN: ICD-10-CM

## 2024-09-05 DIAGNOSIS — G89.29 CHRONIC LEFT SI JOINT PAIN: ICD-10-CM

## 2024-09-05 DIAGNOSIS — Z98.1 S/P SPINAL FUSION: ICD-10-CM

## 2024-09-05 PROCEDURE — 72192 CT PELVIS W/O DYE: CPT | Mod: GC | Performed by: RADIOLOGY

## 2024-09-06 ENCOUNTER — VIRTUAL VISIT (OUTPATIENT)
Dept: NEUROSURGERY | Facility: CLINIC | Age: 64
End: 2024-09-06
Payer: COMMERCIAL

## 2024-09-06 DIAGNOSIS — Z98.1 S/P SPINAL FUSION: ICD-10-CM

## 2024-09-06 DIAGNOSIS — M47.816 LUMBAR SPONDYLOSIS: ICD-10-CM

## 2024-09-06 DIAGNOSIS — M53.3 SI (SACROILIAC) JOINT DYSFUNCTION: Primary | ICD-10-CM

## 2024-09-06 PROCEDURE — 99442 PR PHYSICIAN TELEPHONE EVALUATION 11-20 MIN: CPT | Mod: 93 | Performed by: ORTHOPAEDIC SURGERY

## 2024-09-06 RX ORDER — FLUOXETINE 40 MG/1
40 CAPSULE ORAL DAILY
COMMUNITY
Start: 2024-08-12

## 2024-09-06 RX ORDER — PROGESTERONE 200 MG/1
200 CAPSULE ORAL DAILY
COMMUNITY
Start: 2024-05-30

## 2024-09-06 RX ORDER — ESTRADIOL 0.04 MG/D
1 PATCH, EXTENDED RELEASE TRANSDERMAL
COMMUNITY
Start: 2024-08-25

## 2024-09-06 RX ORDER — SEMAGLUTIDE 1.7 MG/.75ML
1.7 INJECTION, SOLUTION SUBCUTANEOUS
COMMUNITY
Start: 2023-08-01

## 2024-09-06 NOTE — Clinical Note
Good gypsy Price,   Can you help with setting up patient's lumbar MRI & and in-person follow up with Dr. Urbina after? Thank you!

## 2024-09-06 NOTE — PROGRESS NOTES
Kristal is a 64 year old who is being evaluated via a billable telephone visit.    What phone number would you like to be contacted at? [phone]   How would you like to obtain your AVS? Josh  Total Time: 13 minutes    Diagnosis: Right Buttock Pain    Kristal calls in today noting she has been having severe right sided buttock and thigh pain.  Sometimes it comes down around the knee.  It wakes her up at night from sleep.  Sometimes it so stiff she cannot put her foot down on the ground.  She is done lots of therapy in the past.  We obtained a pelvic CT scan to assess her SI joint fusion and I reviewed this and reported to her that there is very robust bone growth across all of the implants and SI joint and I do not see any evidence of right sided nonunion.  She does have some left-sided symptoms but they are much more mild.  I do note that she has some lumbar spondylosis at the L4-5 level in particular on her pelvic CT scan.  Perhaps some of her symptoms are coming from the lumbar spine but is little bit difficult to assess without complete imaging of that area.  I suggested that she complete a lumbar MRI and then come back to see me in person so I could do a more detailed exam to try and help her get to the bottom of the symptoms    Mac Urbina MD

## 2024-09-06 NOTE — LETTER
9/6/2024      Kristal Lester  [address] [place] [address]      Dear Colleague,    Thank you for referring your patient, Kristal Lester, to the [facility]UROS[facility]INIC [place]. Please see a copy of my visit note below.    Kristal is a 64 year old who is being evaluated via a billable telephone visit.    What phone number would you like to be contacted at? [phone]   How would you like to obtain your AVS? MyChart  Total Time: 13 minutes    Diagnosis: Right Buttock Pain    Kristal calls in today noting she has been having severe right sided buttock and thigh pain.  Sometimes it comes down around the knee.  It wakes her up at night from sleep.  Sometimes it so stiff she cannot put her foot down on the ground.  She is done lots of therapy in the past.  We obtained a pelvic CT scan to assess her SI joint fusion and I reviewed this and reported to her that there is very robust bone growth across all of the implants and SI joint and I do not see any evidence of right sided nonunion.  She does have some left-sided symptoms but they are much more mild.  I do note that she has some lumbar spondylosis at the L4-5 level in particular on her pelvic CT scan.  Perhaps some of her symptoms are coming from the lumbar spine but is little bit difficult to assess without complete imaging of that area.  I suggested that she complete a lumbar MRI and then come back to see me in person so I could do a more detailed exam to try and help her get to the bottom of the symptoms    Mac Urbina MD      Again, thank you for allowing me to participate in the care of your patient.        Sincerely,        Mac Urbina MD

## 2024-10-01 ENCOUNTER — ANCILLARY PROCEDURE (OUTPATIENT)
Dept: MRI IMAGING | Facility: CLINIC | Age: 64
End: 2024-10-01
Attending: ORTHOPAEDIC SURGERY
Payer: COMMERCIAL

## 2024-10-01 DIAGNOSIS — Z98.1 S/P SPINAL FUSION: ICD-10-CM

## 2024-10-01 DIAGNOSIS — M47.816 LUMBAR SPONDYLOSIS: ICD-10-CM

## 2024-10-01 DIAGNOSIS — M53.3 SI (SACROILIAC) JOINT DYSFUNCTION: ICD-10-CM

## 2024-10-01 PROCEDURE — 72148 MRI LUMBAR SPINE W/O DYE: CPT | Performed by: RADIOLOGY

## 2024-11-01 ENCOUNTER — OFFICE VISIT (OUTPATIENT)
Dept: NEUROSURGERY | Facility: CLINIC | Age: 64
End: 2024-11-01
Payer: COMMERCIAL

## 2024-11-01 VITALS
SYSTOLIC BLOOD PRESSURE: 122 MMHG | HEART RATE: 74 BPM | BODY MASS INDEX: 25.49 KG/M2 | HEIGHT: 64 IN | DIASTOLIC BLOOD PRESSURE: 77 MMHG | WEIGHT: 149.3 LBS

## 2024-11-01 DIAGNOSIS — M79.18 LUMBAR MUSCLE PAIN: Primary | ICD-10-CM

## 2024-11-01 PROCEDURE — 99214 OFFICE O/P EST MOD 30 MIN: CPT | Mod: GC | Performed by: ORTHOPAEDIC SURGERY

## 2024-11-01 ASSESSMENT — PAIN SCALES - GENERAL: PAINLEVEL_OUTOF10: SEVERE PAIN (7)

## 2024-11-01 NOTE — LETTER
2024      Kristal Lester  [address] [place] [address]      Dear Colleague,    Thank you for referring your patient, Kristal Lester, to the [facility]UROSURGERY [facility]INIC [place]. Please see a copy of my visit note below.    Spine Surgery Return Clinic Visit      Chief Complaint:   RECHECK      Interval HPI:  Symptom Profile Including: location of symptoms, onset, severity, exacerbating/alleviating factors, previous treatments:        Kristal Lester is a 64 year old female who presents to clinic today for follow-up regarding her right sided buttock pain.  She localizes the pain over the right PSIS.  She underwent a revision SI joint fusion with Dr. Urbina in .  A CT scan performed on 2024 demonstrate adequate fusion.  An MRI was ordered of the lumbar spine to further evaluate her pain.  She states that the pain is worse with movement.  She has not done physical therapy recently.  She takes Motrin as needed for the pain.  She additionally has right hip bursitis which has been persistent despite steroid injections in the past.  She is here to follow-up regarding the MRI findings.            Past Medical History:     Past Medical History:   Diagnosis Date     Benign paroxysmal positional vertigo, left 7/10/2019     HLD (hyperlipidemia)      PVC's (premature ventricular contractions)      SI (sacroiliac) joint dysfunction             Past Surgical History:     Past Surgical History:   Procedure Laterality Date     Bunion surgery  2017      SECTION  5 years ago, 35 years ago     ELBOW SURGERY Right      MAMMOPLASTY AUGMENTATION  23 years ago     OPTICAL TRACKING SYSTEM FUSION REVISION SACRAL ILIAC N/A 2021    Procedure: Revision right sacroiliac joint fusion with iliosacral screws and open posterior joint debridement and grafting, use of O-Arm/stealth navigation, allograft, local autograft, and bone morphogenic;  Surgeon: Mac Urbina MD;   [facility] OR     OPTICAL TRACKING SYSTEM FUSION SACRAL ILIAC Right 8/22/2019    Procedure: Right Minimal Invasive Sacral Iliac Joint Fusion;  Surgeon: Mac Urbina MD;  [facility] OR            Social History:     Social History     Tobacco Use     Smoking status: Never     Smokeless tobacco: Never   Substance Use Topics     Alcohol use: Not Currently            Family History:     Family History   Problem Relation Age of Onset     Chronic Obstructive Pulmonary Disease Father      Heart Disease Father      Hyperlipidemia Father      Scleroderma Mother      Mental Illness Brother      Alzheimer Disease Maternal Grandmother      Parkinsonism Maternal Grandfather      Anesthesia Reaction No family hx of      Deep Vein Thrombosis (DVT) No family hx of             Allergies:     Allergies   Allergen Reactions     Bees Shortness Of Breath     Cellulitis of the leg            Medications:     Current Outpatient Medications   Medication Sig Dispense Refill     EPINEPHrine (EPIPEN/ADRENACLICK/OR ANY BX GENERIC EQUIV) 0.3 MG/0.3ML injection 2-pack Inject 0.3 mg into the muscle as needed for anaphylaxis       estradiol (VIVELLE-DOT) 0.0375 MG/24HR BIW patch Place 1 patch onto the skin twice a week.       FLUoxetine (PROZAC) 40 MG capsule Take 40 mg by mouth daily.       progesterone (PROMETRIUM) 200 MG capsule Take 200 mg by mouth daily.       rosuvastatin (CRESTOR) 10 MG tablet Take 10 mg by mouth At Bedtime       vitamin D3 (CHOLECALCIFEROL) 2000 units (50 mcg) tablet Take 1 tablet by mouth every morning        WEGOVY 1.7 MG/0.75ML pen Inject 1.7 mg subcutaneously every 7 days.       acetaminophen (TYLENOL) 325 MG tablet Take 2 tablets (650 mg) by mouth every 4 hours as needed for other (mild pain) 100 tablet 0     Calcium 200 MG TABS Take by mouth every morning        celecoxib (CELEBREX) 100 MG capsule TAKE 1 CAPSULE BY MOUTH TWICE A DAY 60 capsule 0     cyclobenzaprine (FLEXERIL) 5 MG tablet Take 1-2  "tablets (5-10 mg) by mouth nightly as needed for muscle spasms 45 tablet 3     cyclobenzaprine (FLEXERIL) 5 MG tablet Take 1-2 tablets (5-10 mg) by mouth nightly as needed for muscle spasms 30 tablet 1     diphenhydrAMINE-APAP, sleep, (TYLENOL PM EXTRA STRENGTH PO) Take by mouth daily as needed        escitalopram (LEXAPRO) 10 MG tablet Take 10 mg by mouth every morning        gabapentin (NEURONTIN) 100 MG capsule        meloxicam (MOBIC) 15 MG tablet Take 1 tablet (15 mg) by mouth daily 30 tablet 2     meloxicam (MOBIC) 15 MG tablet Take 1 tablet (15 mg) by mouth daily 30 tablet 0     order for DME SI-loc belt 1 Units 0     oxyCODONE (ROXICODONE) 5 MG tablet Take 1-2 tablets (5-10 mg) by mouth every 4 hours as needed for pain (Moderate to Severe) 30 tablet 0     polyethylene glycol (MIRALAX) 17 g packet Take 17 g by mouth daily 7 packet 0     senna-docusate (SENOKOT-S/PERICOLACE) 8.6-50 MG tablet Take 1-2 tablets by mouth 2 times daily Take while on oral narcotics to prevent or treat constipation. 30 tablet 0     Current Facility-Administered Medications   Medication Dose Route Frequency Provider Last Rate Last Admin     lidocaine (PF) (XYLOCAINE) 1 % injection 5 mL  5 mL   Mac Urbina MD   5 mL at 07/05/22 1502     triamcinolone (KENALOG-40) injection 40 mg  40 mg   Mac Urbina MD   40 mg at 07/05/22 1502             Review of Systems:   A focused musculoskeletal and neurologic ROS was performed with pertinent positives and negatives noted in the HPI.  Additional systems were also reviewed and are documented at the bottom of the note.         Physical Exam:   Vitals: /77   Pulse 74   Ht 1.626 m (5' 4\")   Wt 67.7 kg (149 lb 4.8 oz)   BMI 25.63 kg/m    Musculoskeletal, Neurologic, and Spine:        Lumbar Spine:    Appearance - No gross stepoffs or deformities    Motor -     L2-3: Hip flexion 5/5 R and 5/5 L strength          L3/4:  Knee extension R 5/5 and L 5/5 strength    "      L4/5:  Foot dorsiflexion R 5/5 L 5/5 and       EHL dorsiflexion R 5/5 L 5/5 strength         S1:  Plantarflexion/Peroneal Muscles  R 5/5 and L 5/5 strength    Sensation: intact to light touch L3-S1 distribution BLE    Hip Exam:  Tenderness to palpation over the right greater trochanter  No pain with logroll    Alignment:  Patient stands with a neutral standing sagittal balance.     Right Left   Carmina Finger Test  + -   VANESA  + -   Thigh Thrust: + -   Pelvic Compression Test  + -   Palpation  + -   Pelvic Gapping  - -   Gaenslen s Test  + -   Sacral Thrust (SI) + -               Imaging:   We ordered and independently reviewed new radiographs at this clinic visit. The results were discussed with the patient. Findings include:     MRI of the lumbar spine performed on 10/1/2024 demonstrates multilevel lumbar spondylosis and degenerative disc disease without any evidence of significant central or neuroforaminal stenosis.  There are multiple nerve root cysts present.       Assessment and Plan:     64 year old female with right-sided buttock pain status post right SI joint revision fusion with Dr. Urbina in 2021.    We reviewed the imaging and discussed the diagnosis with the patient.  Overall, the CT demonstrates a solid fusion of her right SI joint.  However, she continues to localize pain over the area.  Based on physical examination, this pain appears to be muscular in origin.  She is very tender to palpation overlying the right buttock and low back.  She does not have any symptoms of radicular type pain and the MRI is not concerning for any level of significant stenosis.  Given the fact that her pain appears mostly muscular in origin, recommended continued nonoperative management.  We will refer her to the pain clinic to evaluate if she is a candidate for spinal cord stimulator treatment versus other therapy.  We discussed that she may benefit from massage therapy or acupuncture as well.  We will refer her to  physical therapy as she has not done therapy in quite some time.  She can follow-up with us as needed in the future    --Referral to [place] [facility]  -- Physical therapy referral      Respectfully,  Rich Osei DO  Spine Fellow    Attending MD (Dr. Mac Urbina) : I personally performed greater than 50% of the effort related to this patient visit and am responsible for the medical decision making and billing in this case.  I met with the patient, reviewed and verified the history and physical exam of the patient and discussed the patient's management with the other clinical providers involved in this patient's care including any involved residents or physicians assistants. I also personally reviewed the imaging and I personally formulated the treatment plan and diagnosis in their entirety.  I reviewed the above note and agree with the documented findings and plan of care, which were communicated to the patient.      Mac Urbina MD           Again, thank you for allowing me to participate in the care of your patient.        Sincerely,        Mac Urbina MD

## 2024-11-01 NOTE — PROGRESS NOTES
Spine Surgery Return Clinic Visit      Chief Complaint:   RECHECK      Interval HPI:  Symptom Profile Including: location of symptoms, onset, severity, exacerbating/alleviating factors, previous treatments:        Kristal Lester is a 64 year old female who presents to clinic today for follow-up regarding her right sided buttock pain.  She localizes the pain over the right PSIS.  She underwent a revision SI joint fusion with Dr. Urbina in .  A CT scan performed on 2024 demonstrate adequate fusion.  An MRI was ordered of the lumbar spine to further evaluate her pain.  She states that the pain is worse with movement.  She has not done physical therapy recently.  She takes Motrin as needed for the pain.  She additionally has right hip bursitis which has been persistent despite steroid injections in the past.  She is here to follow-up regarding the MRI findings.            Past Medical History:     Past Medical History:   Diagnosis Date    Benign paroxysmal positional vertigo, left 7/10/2019    HLD (hyperlipidemia)     PVC's (premature ventricular contractions)     SI (sacroiliac) joint dysfunction             Past Surgical History:     Past Surgical History:   Procedure Laterality Date    Bunion surgery  2017     SECTION  5 years ago, 35 years ago    ELBOW SURGERY Right     MAMMOPLASTY AUGMENTATION  23 years ago    OPTICAL TRACKING SYSTEM FUSION REVISION SACRAL ILIAC N/A 2021    Procedure: Revision right sacroiliac joint fusion with iliosacral screws and open posterior joint debridement and grafting, use of O-Arm/stealth navigation, allograft, local autograft, and bone morphogenic;  Surgeon: Mac Urbina MD;  [facility] OR    OPTICAL TRACKING SYSTEM FUSION SACRAL ILIAC Right 2019    Procedure: Right Minimal Invasive Sacral Iliac Joint Fusion;  Surgeon: Mac Urbina MD;  [facility] OR            Social History:     Social History     Tobacco Use    Smoking  status: Never    Smokeless tobacco: Never   Substance Use Topics    Alcohol use: Not Currently            Family History:     Family History   Problem Relation Age of Onset    Chronic Obstructive Pulmonary Disease Father     Heart Disease Father     Hyperlipidemia Father     Scleroderma Mother     Mental Illness Brother     Alzheimer Disease Maternal Grandmother     Parkinsonism Maternal Grandfather     Anesthesia Reaction No family hx of     Deep Vein Thrombosis (DVT) No family hx of             Allergies:     Allergies   Allergen Reactions    Bees Shortness Of Breath     Cellulitis of the leg            Medications:     Current Outpatient Medications   Medication Sig Dispense Refill    EPINEPHrine (EPIPEN/ADRENACLICK/OR ANY BX GENERIC EQUIV) 0.3 MG/0.3ML injection 2-pack Inject 0.3 mg into the muscle as needed for anaphylaxis      estradiol (VIVELLE-DOT) 0.0375 MG/24HR BIW patch Place 1 patch onto the skin twice a week.      FLUoxetine (PROZAC) 40 MG capsule Take 40 mg by mouth daily.      progesterone (PROMETRIUM) 200 MG capsule Take 200 mg by mouth daily.      rosuvastatin (CRESTOR) 10 MG tablet Take 10 mg by mouth At Bedtime      vitamin D3 (CHOLECALCIFEROL) 2000 units (50 mcg) tablet Take 1 tablet by mouth every morning       WEGOVY 1.7 MG/0.75ML pen Inject 1.7 mg subcutaneously every 7 days.      acetaminophen (TYLENOL) 325 MG tablet Take 2 tablets (650 mg) by mouth every 4 hours as needed for other (mild pain) 100 tablet 0    Calcium 200 MG TABS Take by mouth every morning       celecoxib (CELEBREX) 100 MG capsule TAKE 1 CAPSULE BY MOUTH TWICE A DAY 60 capsule 0    cyclobenzaprine (FLEXERIL) 5 MG tablet Take 1-2 tablets (5-10 mg) by mouth nightly as needed for muscle spasms 45 tablet 3    cyclobenzaprine (FLEXERIL) 5 MG tablet Take 1-2 tablets (5-10 mg) by mouth nightly as needed for muscle spasms 30 tablet 1    diphenhydrAMINE-APAP, sleep, (TYLENOL PM EXTRA STRENGTH PO) Take by mouth daily as needed        "escitalopram (LEXAPRO) 10 MG tablet Take 10 mg by mouth every morning       gabapentin (NEURONTIN) 100 MG capsule       meloxicam (MOBIC) 15 MG tablet Take 1 tablet (15 mg) by mouth daily 30 tablet 2    meloxicam (MOBIC) 15 MG tablet Take 1 tablet (15 mg) by mouth daily 30 tablet 0    order for DME SI-loc belt 1 Units 0    oxyCODONE (ROXICODONE) 5 MG tablet Take 1-2 tablets (5-10 mg) by mouth every 4 hours as needed for pain (Moderate to Severe) 30 tablet 0    polyethylene glycol (MIRALAX) 17 g packet Take 17 g by mouth daily 7 packet 0    senna-docusate (SENOKOT-S/PERICOLACE) 8.6-50 MG tablet Take 1-2 tablets by mouth 2 times daily Take while on oral narcotics to prevent or treat constipation. 30 tablet 0     Current Facility-Administered Medications   Medication Dose Route Frequency Provider Last Rate Last Admin    lidocaine (PF) (XYLOCAINE) 1 % injection 5 mL  5 mL   Mac Urbina MD   5 mL at 07/05/22 1502    triamcinolone (KENALOG-40) injection 40 mg  40 mg   Mac Ubrina MD   40 mg at 07/05/22 1502             Review of Systems:   A focused musculoskeletal and neurologic ROS was performed with pertinent positives and negatives noted in the HPI.  Additional systems were also reviewed and are documented at the bottom of the note.         Physical Exam:   Vitals: /77   Pulse 74   Ht 1.626 m (5' 4\")   Wt 67.7 kg (149 lb 4.8 oz)   BMI 25.63 kg/m    Musculoskeletal, Neurologic, and Spine:        Lumbar Spine:    Appearance - No gross stepoffs or deformities    Motor -     L2-3: Hip flexion 5/5 R and 5/5 L strength          L3/4:  Knee extension R 5/5 and L 5/5 strength         L4/5:  Foot dorsiflexion R 5/5 L 5/5 and       EHL dorsiflexion R 5/5 L 5/5 strength         S1:  Plantarflexion/Peroneal Muscles  R 5/5 and L 5/5 strength    Sensation: intact to light touch L3-S1 distribution BLE    Hip Exam:  Tenderness to palpation over the right greater trochanter  No pain with " logroll    Alignment:  Patient stands with a neutral standing sagittal balance.     Right Left   Carmina Finger Test  + -   VANESA  + -   Thigh Thrust: + -   Pelvic Compression Test  + -   Palpation  + -   Pelvic Gapping  - -   Gaenslen s Test  + -   Sacral Thrust (SI) + -               Imaging:   We ordered and independently reviewed new radiographs at this clinic visit. The results were discussed with the patient. Findings include:     MRI of the lumbar spine performed on 10/1/2024 demonstrates multilevel lumbar spondylosis and degenerative disc disease without any evidence of significant central or neuroforaminal stenosis.  There are multiple nerve root cysts present.       Assessment and Plan:     64 year old female with right-sided buttock pain status post right SI joint revision fusion with Dr. Urbina in 2021.    We reviewed the imaging and discussed the diagnosis with the patient.  Overall, the CT demonstrates a solid fusion of her right SI joint.  However, she continues to localize pain over the area.  Based on physical examination, this pain appears to be muscular in origin.  She is very tender to palpation overlying the right buttock and low back.  She does not have any symptoms of radicular type pain and the MRI is not concerning for any level of significant stenosis.  Given the fact that her pain appears mostly muscular in origin, recommended continued nonoperative management.  We will refer her to the pain clinic to evaluate if she is a candidate for spinal cord stimulator treatment versus other therapy.  We discussed that she may benefit from massage therapy or acupuncture as well.  We will refer her to physical therapy as she has not done therapy in quite some time.  She can follow-up with us as needed in the future    --[facility]  -- Physical therapy referral      Respectfully,  Rich Osei DO  Spine Fellow    Attending MD (Dr. Mac Urbina) : I personally performed greater  than 50% of the effort related to this patient visit and am responsible for the medical decision making and billing in this case.  I met with the patient, reviewed and verified the history and physical exam of the patient and discussed the patient's management with the other clinical providers involved in this patient's care including any involved residents or physicians assistants. I also personally reviewed the imaging and I personally formulated the treatment plan and diagnosis in their entirety.  I reviewed the above note and agree with the documented findings and plan of care, which were communicated to the patient.      Mac Urbina MD

## 2024-11-04 NOTE — TELEPHONE ENCOUNTER
REASON FOR VISIT: Lumbar muscle pain   DATE OF APPT: 2/17/2025   NOTES (FOR ALL VISITS) STATUS DETAILS   OFFICE NOTE from referring provider Internal [facility]  Rich Osei DO 11/01/2024   MEDICATION LIST Internal    IMAGING  (FOR ALL VISITS)     XR N/A    MRI (HEAD, NECK, SPINE) Internal [facility]  MR Lumbar spine 10/01/2024   CT (HEAD, NECK, SPINE) N/A

## 2024-11-15 NOTE — TELEPHONE ENCOUNTER
REASON FOR VISIT: M79.18 (ICD-10-CM) - Lumbar muscle pain    DATE OF APPT: 3/03/2025   NOTES (FOR ALL VISITS) STATUS DETAILS   OFFICE NOTE [facility] provider [facility]  Rich Osei DO 11/01/2024   MEDICATION LIST Internal    IMAGING  (FOR ALL VISITS)     XR N/A    MRI (HEAD, NECK, SPINE) Internal [facility]  MR Lumbar spine 10/01/2024   CT (HEAD, NECK, SPINE) N/A

## 2025-01-04 ENCOUNTER — MYC MEDICAL ADVICE (OUTPATIENT)
Dept: ORTHOPEDICS | Facility: CLINIC | Age: 65
End: 2025-01-04
Payer: COMMERCIAL

## 2025-01-04 DIAGNOSIS — M54.2 CERVICALGIA: ICD-10-CM

## 2025-01-04 DIAGNOSIS — M50.30 DDD (DEGENERATIVE DISC DISEASE), CERVICAL: ICD-10-CM

## 2025-01-04 DIAGNOSIS — M54.12 CERVICAL RADICULAR PAIN: Primary | ICD-10-CM

## 2025-01-29 ENCOUNTER — VIRTUAL VISIT (OUTPATIENT)
Dept: ORTHOPEDICS | Facility: CLINIC | Age: 65
End: 2025-01-29
Attending: PREVENTIVE MEDICINE
Payer: COMMERCIAL

## 2025-01-29 DIAGNOSIS — R20.0 NUMBNESS AND TINGLING IN BOTH HANDS: ICD-10-CM

## 2025-01-29 DIAGNOSIS — R20.2 NUMBNESS AND TINGLING IN BOTH HANDS: ICD-10-CM

## 2025-01-29 DIAGNOSIS — M54.12 CERVICAL RADICULAR PAIN: Primary | ICD-10-CM

## 2025-01-29 DIAGNOSIS — M54.2 CERVICALGIA: ICD-10-CM

## 2025-01-29 DIAGNOSIS — M50.30 DDD (DEGENERATIVE DISC DISEASE), CERVICAL: ICD-10-CM

## 2025-01-29 PROCEDURE — 98005 SYNCH AUDIO-VIDEO EST LOW 20: CPT | Performed by: PREVENTIVE MEDICINE

## 2025-01-29 NOTE — LETTER
1/29/2025      Kristal Lester  [address] [place] [address]      Dear Colleague,    Thank you for referring your patient, Kristal Lester, to the [facility] [place]. Please see a copy of my visit note below.    Kristal is a 64 year old who is being evaluated via a billable video visit.    How would you like to obtain your AVS? MyChart  If the video visit is dropped, the invitation should be resent by: Text to cell phone: [phone]  Will anyone else be joining your video visit? No  {If patient encounters technical issues they should call [phone] :[id number]}        Subjective  Kristal is a 64 year old, presenting for the following health issues:  RECHECK (Follow up for neck )    HPI     Has had increased neck pain and radiation of pain into upper back and arms  Has had cervical MRI and injections in the past which have helped with neck pain and radiculopathy in the past   But this seems worse and different  No injury  Medications advil and flexeril not helpin      Review of Systems  Constitutional, HEENT, cardiovascular, pulmonary, gi and gu systems are negative, except as otherwise noted.      Objective          Vitals:  No vitals were obtained today due to virtual visit.    Physical Exam   GENERAL: alert and no distress  EYES: Eyes grossly normal to inspection.  No discharge or erythema, or obvious scleral/conjunctival abnormalities.  RESP: No audible wheeze, cough, or visible cyanosis.    SKIN: Visible skin clear. No significant rash, abnormal pigmentation or lesions.  NEURO: Cranial nerves grossly intact.  Mentation and speech appropriate for age.  PSYCH: Appropriate affect, tone, and pace of words  Assessment/Plan  65 yo female with cervical ddd, disc herniations, radiculopathy, worse  Discussed and ordered cervical MRI  Consider MINISTERIO vs. Other treatments  Tylenol and mobic daily  Consider robaxin  Followup after MRI        Video-Visit Details    Type of  service:  Video Visit   Originating Location (pt. Location): Home  {PROVIDER LOCATION On-site should be selected for visits conducted from your clinic location or adjoining [facility] hospital, academic office, or other nearby [facility] building. Off-site should be selected for all other provider locations, including home:[id number]}  Distant Location (provider location):  On-site  Platform used for Video Visit: Alexa  Signed Electronically by: Santiago Gan MD  Video start time: 1:20pm  Video end time: 1:40pm      Again, thank you for allowing me to participate in the care of your patient.        Sincerely,        Santiago Gan MD    Electronically signed

## 2025-01-29 NOTE — LETTER
1/29/2025      Kristal Lester  [address] [place] [address]      Dear Colleague,    Thank you for referring your patient, Kristal Lester, to the [facility] [place]. Please see a copy of my visit note below.    Kristal is a 64 year old who is being evaluated via a billable video visit.    How would you like to obtain your AVS? MyChart  If the video visit is dropped, the invitation should be resent by: Text to cell phone: [phone]  Will anyone else be joining your video visit? No  {If patient encounters technical issues they should call [phone] :[id number]}        Subjective  Kristal is a 64 year old, presenting for the following health issues:  RECHECK (Follow up for neck )    HPI     Has had increased neck pain and radiation of pain into upper back and arms  Has had cervical MRI and injections in the past which have helped with neck pain and radiculopathy in the past   But this seems worse and different  No injury  Medications advil and flexeril not helpin      Review of Systems  Constitutional, HEENT, cardiovascular, pulmonary, gi and gu systems are negative, except as otherwise noted.      Objective          Vitals:  No vitals were obtained today due to virtual visit.    Physical Exam   GENERAL: alert and no distress  EYES: Eyes grossly normal to inspection.  No discharge or erythema, or obvious scleral/conjunctival abnormalities.  RESP: No audible wheeze, cough, or visible cyanosis.    SKIN: Visible skin clear. No significant rash, abnormal pigmentation or lesions.  NEURO: Cranial nerves grossly intact.  Mentation and speech appropriate for age.  PSYCH: Appropriate affect, tone, and pace of words  Assessment/Plan  63 yo female with cervical ddd, disc herniations, radiculopathy, worse  Discussed and ordered cervical MRI  Consider MINISTERIO vs. Other treatments  Tylenol and mobic daily  Consider robaxin  Followup after MRI        Video-Visit Details    Type of  service:  Video Visit   Originating Location (pt. Location): Home  {PROVIDER LOCATION On-site should be selected for visits conducted from your clinic location or adjoining [facility] hospital, academic office, or other nearby [facility] building. Off-site should be selected for all other provider locations, including home:[id number]}  Distant Location (provider location):  On-site  Platform used for Video Visit: Alexa  Signed Electronically by: Santiago Gan MD  Video start time: 1:20pm  Video end time: 1:40pm      Again, thank you for allowing me to participate in the care of your patient.        Sincerely,        Santiago Gan MD    Electronically signed

## 2025-01-29 NOTE — PROGRESS NOTES
Kristal is a 64 year old who is being evaluated via a billable video visit.    How would you like to obtain your AVS? MyChart  If the video visit is dropped, the invitation should be resent by: Text to cell phone: [phone]  Will anyone else be joining your video visit? No          Subjective   Kristal is a 64 year old, presenting for the following health issues:  RECHECK (Follow up for neck )    HPI     Has had increased neck pain and radiation of pain into upper back and arms  Has had cervical MRI and injections in the past which have helped with neck pain and radiculopathy in the past   But this seems worse and different  No injury  Medications advil and flexeril not helpin      Review of Systems  Constitutional, HEENT, cardiovascular, pulmonary, gi and gu systems are negative, except as otherwise noted.      Objective           Vitals:  No vitals were obtained today due to virtual visit.    Physical Exam   GENERAL: alert and no distress  EYES: Eyes grossly normal to inspection.  No discharge or erythema, or obvious scleral/conjunctival abnormalities.  RESP: No audible wheeze, cough, or visible cyanosis.    SKIN: Visible skin clear. No significant rash, abnormal pigmentation or lesions.  NEURO: Cranial nerves grossly intact.  Mentation and speech appropriate for age.  PSYCH: Appropriate affect, tone, and pace of words  Assessment/Plan  65 yo female with cervical ddd, disc herniations, radiculopathy, worse  Discussed and ordered cervical MRI  Consider MINISTERIO vs. Other treatments  Tylenol and mobic daily  Consider robaxin  Followup after MRI        Video-Visit Details    Type of service:  Video Visit   Originating Location (pt. Location): Home    Distant Location (provider location):  On-site  Platform used for Video Visit: Alexa  Signed Electronically by: Santiago Gan MD  Video start time: 1:20pm  Video end time: 1:40pm

## 2025-02-09 ENCOUNTER — HEALTH MAINTENANCE LETTER (OUTPATIENT)
Age: 65
End: 2025-02-09

## 2025-02-17 ENCOUNTER — PRE VISIT (OUTPATIENT)
Dept: PALLIATIVE MEDICINE | Facility: CLINIC | Age: 65
End: 2025-02-17

## 2025-02-21 ENCOUNTER — ANCILLARY PROCEDURE (OUTPATIENT)
Dept: MRI IMAGING | Facility: CLINIC | Age: 65
End: 2025-02-21
Attending: PREVENTIVE MEDICINE
Payer: COMMERCIAL

## 2025-02-21 DIAGNOSIS — M54.2 CERVICALGIA: ICD-10-CM

## 2025-02-21 DIAGNOSIS — M50.30 DDD (DEGENERATIVE DISC DISEASE), CERVICAL: ICD-10-CM

## 2025-02-21 DIAGNOSIS — R20.2 NUMBNESS AND TINGLING IN BOTH HANDS: ICD-10-CM

## 2025-02-21 DIAGNOSIS — R20.0 NUMBNESS AND TINGLING IN BOTH HANDS: ICD-10-CM

## 2025-02-21 DIAGNOSIS — M54.12 CERVICAL RADICULAR PAIN: ICD-10-CM

## 2025-02-21 PROCEDURE — 72141 MRI NECK SPINE W/O DYE: CPT | Performed by: RADIOLOGY

## 2025-02-25 ENCOUNTER — OFFICE VISIT (OUTPATIENT)
Dept: ORTHOPEDICS | Facility: CLINIC | Age: 65
End: 2025-02-25
Attending: PREVENTIVE MEDICINE
Payer: COMMERCIAL

## 2025-02-25 VITALS — HEIGHT: 64 IN | WEIGHT: 150 LBS | BODY MASS INDEX: 25.61 KG/M2

## 2025-02-25 DIAGNOSIS — M50.30 DDD (DEGENERATIVE DISC DISEASE), CERVICAL: ICD-10-CM

## 2025-02-25 DIAGNOSIS — R20.0 NUMBNESS AND TINGLING IN BOTH HANDS: ICD-10-CM

## 2025-02-25 DIAGNOSIS — R20.2 NUMBNESS AND TINGLING IN BOTH HANDS: ICD-10-CM

## 2025-02-25 DIAGNOSIS — M54.12 CERVICAL RADICULAR PAIN: Primary | ICD-10-CM

## 2025-02-25 PROCEDURE — 99214 OFFICE O/P EST MOD 30 MIN: CPT | Performed by: PREVENTIVE MEDICINE

## 2025-02-25 RX ORDER — MELOXICAM 15 MG/1
15 TABLET ORAL DAILY
Qty: 90 TABLET | Refills: 1 | Status: SHIPPED | OUTPATIENT
Start: 2025-02-25

## 2025-02-25 RX ORDER — PROPRANOLOL HYDROCHLORIDE 80 MG/1
80 CAPSULE, EXTENDED RELEASE ORAL DAILY
COMMUNITY
Start: 2025-02-24

## 2025-02-25 NOTE — LETTER
2/25/2025      Kristal Lester  [address] [place] [address]      Dear Colleague,    Thank you for referring your patient, Kristal Lester, to the [facility] [place]. Please see a copy of my visit note below.    HISTORY OF PRESENT ILLNESS  Ms. Lester is a pleasant 64 year old year old female who presents to clinic today with the following:    What problem are you here for: Follow up after neck MRI to review results. She states pain with neck ROM, pain will radiate into her hands and she will have tingling and numbness in her hands. She is complaining of right elbow pain.     She recently started propranolol for hand tremors, which she has found to be helpful.     How long have you had this problem: Chronic     Have you had any recent imaging of this problem? Xrays/MRI/CT scans:  - MRI of cervical spine completed on 2/21/2025    Have you had treatments for this problem in the past?  -Medications: meloxicam daily and she will take two tablets of Tylenol PM at bedtime.   -Physical therapy: [facility]   -Injections: No recent injections.   -Surgery: None    How severe is this problem today? 0-10 scale: 5/10    Does this problem or its symptoms cause difficulty for you falling asleep or staying asleep: Yes      MEDICAL HISTORY  Patient Active Problem List   Diagnosis     Ulnocarpal impingement syndrome     Hallux valgus with bunions     High frequency hearing loss of both ears     Hyperlipidemia     Pineal gland cyst     Benign paroxysmal positional vertigo     SI (sacroiliac) joint dysfunction     Cervicalgia       Current Outpatient Medications   Medication Sig Dispense Refill     acetaminophen (TYLENOL) 325 MG tablet Take 2 tablets (650 mg) by mouth every 4 hours as needed for other (mild pain) 100 tablet 0     Calcium 200 MG TABS Take by mouth every morning        celecoxib (CELEBREX) 100 MG capsule TAKE 1 CAPSULE BY MOUTH TWICE A DAY 60 capsule 0     cyclobenzaprine  (FLEXERIL) 5 MG tablet Take 1-2 tablets (5-10 mg) by mouth nightly as needed for muscle spasms 45 tablet 3     cyclobenzaprine (FLEXERIL) 5 MG tablet Take 1-2 tablets (5-10 mg) by mouth nightly as needed for muscle spasms 30 tablet 1     diphenhydrAMINE-APAP, sleep, (TYLENOL PM EXTRA STRENGTH PO) Take by mouth daily as needed        EPINEPHrine (EPIPEN/ADRENACLICK/OR ANY BX GENERIC EQUIV) 0.3 MG/0.3ML injection 2-pack Inject 0.3 mg into the muscle as needed for anaphylaxis       escitalopram (LEXAPRO) 10 MG tablet Take 10 mg by mouth every morning        estradiol (VIVELLE-DOT) 0.0375 MG/24HR BIW patch Place 1 patch onto the skin twice a week.       FLUoxetine (PROZAC) 40 MG capsule Take 40 mg by mouth daily.       gabapentin (NEURONTIN) 100 MG capsule        meloxicam (MOBIC) 15 MG tablet Take 1 tablet (15 mg) by mouth daily 30 tablet 2     meloxicam (MOBIC) 15 MG tablet Take 1 tablet (15 mg) by mouth daily 30 tablet 0     order for DME SI-loc belt 1 Units 0     oxyCODONE (ROXICODONE) 5 MG tablet Take 1-2 tablets (5-10 mg) by mouth every 4 hours as needed for pain (Moderate to Severe) 30 tablet 0     polyethylene glycol (MIRALAX) 17 g packet Take 17 g by mouth daily 7 packet 0     progesterone (PROMETRIUM) 200 MG capsule Take 200 mg by mouth daily.       rosuvastatin (CRESTOR) 10 MG tablet Take 10 mg by mouth At Bedtime       senna-docusate (SENOKOT-S/PERICOLACE) 8.6-50 MG tablet Take 1-2 tablets by mouth 2 times daily Take while on oral narcotics to prevent or treat constipation. 30 tablet 0     vitamin D3 (CHOLECALCIFEROL) 2000 units (50 mcg) tablet Take 1 tablet by mouth every morning        WEGOVY 1.7 MG/0.75ML pen Inject 1.7 mg subcutaneously every 7 days.         Allergies   Allergen Reactions     Bees Shortness Of Breath     Cellulitis of the leg       Family History   Problem Relation Age of Onset     Chronic Obstructive Pulmonary Disease Father      Heart Disease Father      Hyperlipidemia Father       "Scleroderma Mother      Mental Illness Brother      Alzheimer Disease Maternal Grandmother      Parkinsonism Maternal Grandfather      Anesthesia Reaction No family hx of      Deep Vein Thrombosis (DVT) No family hx of      Social History     Socioeconomic History     Marital status:    Tobacco Use     Smoking status: Never     Smokeless tobacco: Never   Substance and Sexual Activity     Alcohol use: Not Currently     Drug use: Not Currently       Additional medical/Social/Surgical histories reviewed in [facility] and updated as appropriate.     REVIEW OF SYSTEMS (2/25/2025)  10 point ROS of systems including Constitutional, Eyes, Respiratory, Cardiovascular, Gastroenterology, Genitourinary, Integumentary, Musculoskeletal, Psychiatric, Allergic/Immunologic were all negative except for pertinent positives noted in my HPI.     PHYSICAL EXAM  VSS  Vital Signs: Ht 1.626 m (5' 4\")   Wt 68 kg (150 lb)   BMI 25.75 kg/m   Patient declined being weighed. Body mass index is 25.75 kg/m .    General  - normal appearance, in no obvious distress  HEENT  - conjunctivae not injected, moist mucous membranes, normocephalic/atraumatic head, ears normal appearance, no lesions, mouth normal appearance, no scars, normal dentition and teeth present  CV  - normal peripheral perfusion  Pulm  - normal respiratory pattern, non-labored    Musculoskeletal - CERVICAL SPINE  - stance and posture: normal gait without limp, no obvious leg length discrepancy, normal heel and toe walk, normal balance, slightly forward shoulders, head balanced normally on trunk  - inspection: normal bone and joint alignment, no obvious kyphosis  - palpation: no paravertebral or bony tenderness, except at base of neck and trapezius muscles  - ROM: normal and painless flexion, extension, left and right sidebending and rotation with some discomfort  - strength: upper extremities 5/5 in all planes  - special tests:  (+) spurlings- stiffness and discomfort    Neuro  - " biceps, triceps, supinator DTRs 2+ bilaterally, no sensory or motor deficit, grossly normal coordination, normal muscle tone  Skin  - no ecchymosis, erythema, warmth, or induration, no obvious rash  Psych  - interactive, appropriate, normal mood and affect    ASSESSMENT & PLAN  63 yo female with cervical ddd, disc herniations, radiculopathy, numbness and tingling in both hands, not resolved    I independently reviewed the following imaging studies:  Cervical MRI shows ddd, disc herniations  Discussed physical therapy, wants to see surgeon  Will refer to Dr Urbina to discuss surgical options  Refilled mobic   Followup PRN  Patient has been doing home exercise physical therapy program for this problem      Appropriate PPE was utilized for prevention of spread of Covid-19.  Santiago Gan MD, CAM      Again, thank you for allowing me to participate in the care of your patient.        Sincerely,    Santiago Gan MD    Electronically signed

## 2025-02-25 NOTE — PROGRESS NOTES
HISTORY OF PRESENT ILLNESS  Ms. Lester is a pleasant 64 year old year old female who presents to clinic today with the following:    What problem are you here for: Follow up after neck MRI to review results. She states pain with neck ROM, pain will radiate into her hands and she will have tingling and numbness in her hands. She is complaining of right elbow pain.     She recently started propranolol for hand tremors, which she has found to be helpful.     How long have you had this problem: Chronic     Have you had any recent imaging of this problem? Xrays/MRI/CT scans:  - MRI of cervical spine completed on 2/21/2025    Have you had treatments for this problem in the past?  -Medications: meloxicam daily and she will take two tablets of Tylenol PM at bedtime.   -Physical therapy: [facility]   -Injections: No recent injections.   -Surgery: None    How severe is this problem today? 0-10 scale: 5/10    Does this problem or its symptoms cause difficulty for you falling asleep or staying asleep: Yes      MEDICAL HISTORY  Patient Active Problem List   Diagnosis    Ulnocarpal impingement syndrome    Hallux valgus with bunions    High frequency hearing loss of both ears    Hyperlipidemia    Pineal gland cyst    Benign paroxysmal positional vertigo    SI (sacroiliac) joint dysfunction    Cervicalgia       Current Outpatient Medications   Medication Sig Dispense Refill    acetaminophen (TYLENOL) 325 MG tablet Take 2 tablets (650 mg) by mouth every 4 hours as needed for other (mild pain) 100 tablet 0    Calcium 200 MG TABS Take by mouth every morning       celecoxib (CELEBREX) 100 MG capsule TAKE 1 CAPSULE BY MOUTH TWICE A DAY 60 capsule 0    cyclobenzaprine (FLEXERIL) 5 MG tablet Take 1-2 tablets (5-10 mg) by mouth nightly as needed for muscle spasms 45 tablet 3    cyclobenzaprine (FLEXERIL) 5 MG tablet Take 1-2 tablets (5-10 mg) by mouth nightly as needed for muscle spasms 30 tablet 1    diphenhydrAMINE-APAP, sleep, (TYLENOL PM  EXTRA STRENGTH PO) Take by mouth daily as needed       EPINEPHrine (EPIPEN/ADRENACLICK/OR ANY BX GENERIC EQUIV) 0.3 MG/0.3ML injection 2-pack Inject 0.3 mg into the muscle as needed for anaphylaxis      escitalopram (LEXAPRO) 10 MG tablet Take 10 mg by mouth every morning       estradiol (VIVELLE-DOT) 0.0375 MG/24HR BIW patch Place 1 patch onto the skin twice a week.      FLUoxetine (PROZAC) 40 MG capsule Take 40 mg by mouth daily.      gabapentin (NEURONTIN) 100 MG capsule       meloxicam (MOBIC) 15 MG tablet Take 1 tablet (15 mg) by mouth daily 30 tablet 2    meloxicam (MOBIC) 15 MG tablet Take 1 tablet (15 mg) by mouth daily 30 tablet 0    order for DME SI-loc belt 1 Units 0    oxyCODONE (ROXICODONE) 5 MG tablet Take 1-2 tablets (5-10 mg) by mouth every 4 hours as needed for pain (Moderate to Severe) 30 tablet 0    polyethylene glycol (MIRALAX) 17 g packet Take 17 g by mouth daily 7 packet 0    progesterone (PROMETRIUM) 200 MG capsule Take 200 mg by mouth daily.      rosuvastatin (CRESTOR) 10 MG tablet Take 10 mg by mouth At Bedtime      senna-docusate (SENOKOT-S/PERICOLACE) 8.6-50 MG tablet Take 1-2 tablets by mouth 2 times daily Take while on oral narcotics to prevent or treat constipation. 30 tablet 0    vitamin D3 (CHOLECALCIFEROL) 2000 units (50 mcg) tablet Take 1 tablet by mouth every morning       WEGOVY 1.7 MG/0.75ML pen Inject 1.7 mg subcutaneously every 7 days.         Allergies   Allergen Reactions    Bees Shortness Of Breath     Cellulitis of the leg       Family History   Problem Relation Age of Onset    Chronic Obstructive Pulmonary Disease Father     Heart Disease Father     Hyperlipidemia Father     Scleroderma Mother     Mental Illness Brother     Alzheimer Disease Maternal Grandmother     Parkinsonism Maternal Grandfather     Anesthesia Reaction No family hx of     Deep Vein Thrombosis (DVT) No family hx of      Social History     Socioeconomic History    Marital status:    Tobacco Use  "   Smoking status: Never    Smokeless tobacco: Never   Substance and Sexual Activity    Alcohol use: Not Currently    Drug use: Not Currently       Additional medical/Social/Surgical histories reviewed in [facility] and updated as appropriate.     REVIEW OF SYSTEMS (2/25/2025)  10 point ROS of systems including Constitutional, Eyes, Respiratory, Cardiovascular, Gastroenterology, Genitourinary, Integumentary, Musculoskeletal, Psychiatric, Allergic/Immunologic were all negative except for pertinent positives noted in my HPI.     PHYSICAL EXAM  VSS  Vital Signs: Ht 1.626 m (5' 4\")   Wt 68 kg (150 lb)   BMI 25.75 kg/m   Patient declined being weighed. Body mass index is 25.75 kg/m .    General  - normal appearance, in no obvious distress  HEENT  - conjunctivae not injected, moist mucous membranes, normocephalic/atraumatic head, ears normal appearance, no lesions, mouth normal appearance, no scars, normal dentition and teeth present  CV  - normal peripheral perfusion  Pulm  - normal respiratory pattern, non-labored    Musculoskeletal - CERVICAL SPINE  - stance and posture: normal gait without limp, no obvious leg length discrepancy, normal heel and toe walk, normal balance, slightly forward shoulders, head balanced normally on trunk  - inspection: normal bone and joint alignment, no obvious kyphosis  - palpation: no paravertebral or bony tenderness, except at base of neck and trapezius muscles  - ROM: normal and painless flexion, extension, left and right sidebending and rotation with some discomfort  - strength: upper extremities 5/5 in all planes  - special tests:  (+) spurlings- stiffness and discomfort    Neuro  - biceps, triceps, supinator DTRs 2+ bilaterally, no sensory or motor deficit, grossly normal coordination, normal muscle tone  Skin  - no ecchymosis, erythema, warmth, or induration, no obvious rash  Psych  - interactive, appropriate, normal mood and affect    ASSESSMENT & PLAN  63 yo female with cervical ddd, " disc herniations, radiculopathy, numbness and tingling in both hands, not resolved    I independently reviewed the following imaging studies:  Cervical MRI shows ddd, disc herniations  Discussed physical therapy, wants to see surgeon  Will refer to Dr Urbina to discuss surgical options  Refilled mobic   Followup PRN  Patient has been doing home exercise physical therapy program for this problem      Appropriate PPE was utilized for prevention of spread of Covid-19.  Santiago Gan MD, CAQSM

## 2025-02-26 ENCOUNTER — PATIENT OUTREACH (OUTPATIENT)
Dept: CARE COORDINATION | Facility: CLINIC | Age: 65
End: 2025-02-26
Payer: COMMERCIAL

## 2025-02-26 ASSESSMENT — ANXIETY QUESTIONNAIRES
2. NOT BEING ABLE TO STOP OR CONTROL WORRYING: SEVERAL DAYS
6. BECOMING EASILY ANNOYED OR IRRITABLE: NOT AT ALL
GAD7 TOTAL SCORE: 7
7. FEELING AFRAID AS IF SOMETHING AWFUL MIGHT HAPPEN: NOT AT ALL
IF YOU CHECKED OFF ANY PROBLEMS ON THIS QUESTIONNAIRE, HOW DIFFICULT HAVE THESE PROBLEMS MADE IT FOR YOU TO DO YOUR WORK, TAKE CARE OF THINGS AT HOME, OR GET ALONG WITH OTHER PEOPLE: SOMEWHAT DIFFICULT
GAD7 TOTAL SCORE: 7
8. IF YOU CHECKED OFF ANY PROBLEMS, HOW DIFFICULT HAVE THESE MADE IT FOR YOU TO DO YOUR WORK, TAKE CARE OF THINGS AT HOME, OR GET ALONG WITH OTHER PEOPLE?: SOMEWHAT DIFFICULT
4. TROUBLE RELAXING: SEVERAL DAYS
5. BEING SO RESTLESS THAT IT IS HARD TO SIT STILL: SEVERAL DAYS
3. WORRYING TOO MUCH ABOUT DIFFERENT THINGS: MORE THAN HALF THE DAYS
1. FEELING NERVOUS, ANXIOUS, OR ON EDGE: MORE THAN HALF THE DAYS
GAD7 TOTAL SCORE: 7
7. FEELING AFRAID AS IF SOMETHING AWFUL MIGHT HAPPEN: NOT AT ALL

## 2025-02-26 ASSESSMENT — PAIN SCALES - PAIN ENJOYMENT GENERAL ACTIVITY SCALE (PEG)
INTERFERED_ENJOYMENT_LIFE: 4
AVG_PAIN_PASTWEEK: 4
PEG_TOTALSCORE: 4
PEG_TOTALSCORE: 4
INTERFERED_GENERAL_ACTIVITY: 4
AVG_PAIN_PASTWEEK: 4
INTERFERED_ENJOYMENT_LIFE: 4
INTERFERED_GENERAL_ACTIVITY: 4

## 2025-03-03 ENCOUNTER — PRE VISIT (OUTPATIENT)
Dept: PALLIATIVE MEDICINE | Facility: CLINIC | Age: 65
End: 2025-03-03

## 2025-03-03 ENCOUNTER — OFFICE VISIT (OUTPATIENT)
Dept: PALLIATIVE MEDICINE | Facility: CLINIC | Age: 65
End: 2025-03-03
Attending: STUDENT IN AN ORGANIZED HEALTH CARE EDUCATION/TRAINING PROGRAM
Payer: COMMERCIAL

## 2025-03-03 VITALS
DIASTOLIC BLOOD PRESSURE: 69 MMHG | HEART RATE: 63 BPM | SYSTOLIC BLOOD PRESSURE: 110 MMHG | HEIGHT: 65 IN | BODY MASS INDEX: 25.99 KG/M2 | WEIGHT: 156 LBS

## 2025-03-03 DIAGNOSIS — M79.18 LUMBAR MUSCLE PAIN: ICD-10-CM

## 2025-03-03 PROCEDURE — 99204 OFFICE O/P NEW MOD 45 MIN: CPT

## 2025-03-03 PROCEDURE — 3078F DIAST BP <80 MM HG: CPT

## 2025-03-03 PROCEDURE — 1125F AMNT PAIN NOTED PAIN PRSNT: CPT

## 2025-03-03 PROCEDURE — 3074F SYST BP LT 130 MM HG: CPT

## 2025-03-03 ASSESSMENT — PAIN SCALES - GENERAL: PAINLEVEL_OUTOF10: SEVERE PAIN (7)

## 2025-03-03 NOTE — PATIENT INSTRUCTIONS
Referrals:    Pain Psychology for spinal cord stimulator evaluation      Follow up:      Follow up with Dr. Hay after pain psychology       To speak with a nurse, schedule/reschedule/cancel a clinic appointment, or request a medication refill call: [phone].    You can also reach us by MyChart: [email or web address]
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
## 2025-03-04 NOTE — TELEPHONE ENCOUNTER
REASON FOR VISIT: Cervical radicular pain [M54.12]  DDD (degenerative disc disease), cervical [M50.30]  Numbness and tingling in both hands    DATE OF APPT: 3/07/2025   NOTES (FOR ALL VISITS) STATUS DETAILS   [facility]  Santiago Gan MD 2/25/2025   MEDICATION LIST N/A    IMAGING  (FOR ALL VISITS)     XR Internal [facility]  XR Cervical spine 3/07/2025   MRI (HEAD, NECK, SPINE) [facility]  MR Cervical spine 2/21/2025  MR Lumbar spine 10/01/2024   CT (HEAD, NECK, SPINE) N/A

## 2025-03-07 ENCOUNTER — OFFICE VISIT (OUTPATIENT)
Dept: NEUROSURGERY | Facility: CLINIC | Age: 65
End: 2025-03-07
Attending: PREVENTIVE MEDICINE
Payer: COMMERCIAL

## 2025-03-07 ENCOUNTER — PRE VISIT (OUTPATIENT)
Dept: NEUROSURGERY | Facility: CLINIC | Age: 65
End: 2025-03-07

## 2025-03-07 ENCOUNTER — ANCILLARY PROCEDURE (OUTPATIENT)
Dept: GENERAL RADIOLOGY | Facility: CLINIC | Age: 65
End: 2025-03-07
Attending: ORTHOPAEDIC SURGERY
Payer: COMMERCIAL

## 2025-03-07 VITALS
HEIGHT: 65 IN | WEIGHT: 156 LBS | DIASTOLIC BLOOD PRESSURE: 58 MMHG | SYSTOLIC BLOOD PRESSURE: 95 MMHG | BODY MASS INDEX: 25.99 KG/M2

## 2025-03-07 DIAGNOSIS — M50.30 DDD (DEGENERATIVE DISC DISEASE), CERVICAL: ICD-10-CM

## 2025-03-07 DIAGNOSIS — M48.02 SPINAL STENOSIS IN CERVICAL REGION: ICD-10-CM

## 2025-03-07 DIAGNOSIS — R20.0 NUMBNESS AND TINGLING IN BOTH HANDS: ICD-10-CM

## 2025-03-07 DIAGNOSIS — M40.292 OTHER KYPHOSIS OF CERVICAL REGION: Primary | ICD-10-CM

## 2025-03-07 DIAGNOSIS — M54.12 CERVICAL RADICULAR PAIN: ICD-10-CM

## 2025-03-07 DIAGNOSIS — R20.2 NUMBNESS AND TINGLING IN BOTH HANDS: ICD-10-CM

## 2025-03-07 PROCEDURE — 3074F SYST BP LT 130 MM HG: CPT | Performed by: ORTHOPAEDIC SURGERY

## 2025-03-07 PROCEDURE — 3078F DIAST BP <80 MM HG: CPT | Performed by: ORTHOPAEDIC SURGERY

## 2025-03-07 PROCEDURE — 72050 X-RAY EXAM NECK SPINE 4/5VWS: CPT | Mod: GC | Performed by: STUDENT IN AN ORGANIZED HEALTH CARE EDUCATION/TRAINING PROGRAM

## 2025-03-07 PROCEDURE — 1125F AMNT PAIN NOTED PAIN PRSNT: CPT | Performed by: ORTHOPAEDIC SURGERY

## 2025-03-07 PROCEDURE — 99214 OFFICE O/P EST MOD 30 MIN: CPT | Performed by: ORTHOPAEDIC SURGERY

## 2025-03-07 ASSESSMENT — PAIN SCALES - GENERAL: PAINLEVEL_OUTOF10: SEVERE PAIN (7)

## 2025-03-07 NOTE — LETTER
Opioid / Opioid Plus Controlled Substance Agreement    This is an agreement between you and your provider about the safe and appropriate use of controlled substance/opioids prescribed by your care team. Controlled substances are medicines that can cause physical and mental dependence (abuse).    There are strict laws about having and using these medicines. We here at [facility] are committing to working with you in your efforts to get better. To support you in this work, we ll help you schedule regular office appointments for medicine refills. If we must cancel or change your appointment for any reason, we ll make sure you have enough medicine to last until your next appointment.     As a Provider, I will:  Listen carefully to your concerns and treat you with respect.   Recommend a treatment plan that I believe is in your best interest. This plan may involve therapies other than opioid pain medication.   Talk with you often about the possible benefits, and the risk of harm of any medicine that we prescribe for you.   Provide a plan on how to taper (discontinue or go off) using this medicine if the decision is made to stop its use.    As a Patient, I understand that opioid(s):   Are a controlled substance prescribed by my care team to help me function or work and manage my condition(s).   Are strong medicines and can cause serious side effects such as:  Drowsiness, which can seriously affect my driving ability  A lower breathing rate, enough to cause death  Harm to my thinking ability   Depression   Abuse of and addiction to this medicine  Need to be taken exactly as prescribed. Combining opioids with certain medicines or chemicals (such as illegal drugs, sedatives, sleeping pills, and benzodiazepines) can be dangerous or even fatal. If I stop opioids suddenly, I may have severe withdrawal symptoms.  Do not work for all types of pain nor for all patients. If they re not helpful, I may be asked to stop  them.        The risks, benefits and side effects of these medicine(s) were explained to me. I agree that:  I will take part in other treatments as advised by my care team. This may be psychiatry or counseling, physical therapy, behavioral therapy, group treatment or a referral to a specialist.     I will keep all my appointments. I understand that this is part of the monitoring of opioids. My care team may require an office visit for EVERY opioid/controlled substance refill. If I miss appointments or don t follow instructions, my care team may stop my medicine.    I will take my medicines as prescribed. I will not change the dose or schedule unless my care team tells me to. There will be no refills if I run out early.     I may be asked to come to the clinic and complete a urine drug test or complete a pill count at any time. If I don t give a urine sample or participate in a pill count, the care team may stop my medicine.    I will only receive prescriptions from this clinic for chronic pain. If I am treated by another provider for acute pain issues, I will tell them that I am taking opioid pain medication for chronic pain and that I have a treatment agreement with this provider. I will inform my [facility] care team within one business day if I am given a prescription for any pain medication by another healthcare provider. My [facility] care team can contact other providers and pharmacists about my use of any medicines.    It is up to me to make sure that I don t run out of my medicines on weekends or holidays. If my care team is willing to refill my opioid prescription without a visit, I must request refills only during office hours. Refills may take up to 3 business days to process. I will use one pharmacy to fill all my opioid and other controlled substance prescriptions. I will notify the clinic about any changes to my insurance or medication availability.    I am responsible for my  prescriptions. If the medicine/prescription is lost, stolen or destroyed, it will not be replaced. I also agree not to share controlled substance medicines with anyone.    I am aware I should not use any illegal or recreational drugs. I agree not to drink alcohol unless my care team says I can.       If I enroll in the [place] [facility] Cann[facility] program, I will tell my care team prior to my next refill.     I will tell my care team right away if I become pregnant, have a new medical problem treated outside of my regular clinic, or have a change in my medications.    I understand that this medicine can affect my thinking, judgment and reaction time. Alcohol and drugs affect the brain and body, which can affect the safety of my driving. Being under the influence of alcohol or drugs can affect my decision-making, behaviors, personal safety, and the safety of others. Driving while impaired (DWI) can occur if a person is driving, operating, or in physical control of a car, motorcycle, boat, snowmobile, ATV, motorbike, off-road vehicle, or any other motor vehicle ([place] Statute 169A.20). I understand the risk if I choose to drive or operate any vehicle or machinery.    I understand that if I do not follow any of the conditions above, my prescriptions or treatment may be stopped or changed.          Opioids  What You Need to Know    What are opioids?   Opioids are pain medicines that must be prescribed by a doctor. They are also known as narcotics.     Examples are:   morphine (MS Contin, Kasey)  oxycodone (Oxycontin)  oxycodone and acetaminophen (Percocet)  hydrocodone and acetaminophen (Vicodin, Norco)   fentanyl patch (Duragesic)   hydromorphone (Dilaudid)   methadone  codeine (Tylenol #3)     What do opioids do well?   Opioids are best for severe short-term pain such as after a surgery or injury. They may work well for cancer pain. They may help some people with long-lasting (chronic) pain.     What do opioids NOT do  well?   Opioids never get rid of pain entirely, and they don t work well for most patients with chronic pain. Opioids don t reduce swelling, one of the causes of pain.                                    Other ways to manage chronic pain and improve function include:     Treat the health problem that may be causing pain  Anti-inflammation medicines, which reduce swelling and tenderness, such as ibuprofen (Advil, Motrin) or naproxen (Aleve)  Acetaminophen (Tylenol)  Antidepressants and anti-seizure medicines, especially for nerve pain  Topical treatments such as patches or creams  Injections or nerve blocks  Chiropractic or osteopathic treatment  Acupuncture, massage, deep breathing, meditation, visual imagery, aromatherapy  Use heat or ice at the pain site  Physical therapy   Exercise  Stop smoking  Take part in therapy       Risks and side effects     Talk to your doctor before you start or decide to keep taking opioids. Possible side effects include:    Lowering your breathing rate enough to cause death  Overdose, including death, especially if taking higher than prescribed doses  Worse depression symptoms; less pleasure in things you usually enjoy  Feeling tired or sluggish  Slower thoughts or cloudy thinking  Being more sensitive to pain over time; pain is harder to control  Trouble sleeping or restless sleep  Changes in hormone levels (for example, less testosterone)  Changes in sex drive or ability to have sex  Constipation  Unsafe driving  Itching and sweating  Dizziness  Nausea, throwing up and dry mouth    What else should I know about opioids?    Opioids may lead to dependence, tolerance, or addiction.    Dependence means that if you stop or reduce the medicine too quickly, you will have withdrawal symptoms. These include loose poop (diarrhea), jitters, flu-like symptoms, nervousness and tremors. Dependence is not the same as addiction.                     Tolerance means needing higher doses over time to  get the same effect. This may increase the chance of serious side effects.    Addiction is when people improperly use a substance that harms their body, their mind or their relations with others. Use of opiates can cause a relapse of addiction if you have a history of drug or alcohol abuse.    People who have used opioids for a long time may have a lower quality of life, worse depression, higher levels of pain and more visits to doctors.    You can overdose on opioids. Take these steps to lower your risk of overdose:    Recognize the signs:  Signs of overdose include decrease or loss of consciousness (blackout), slowed breathing, trouble waking up and blue lips. If someone is worried about overdose, they should call 911.    Talk to your doctor about Narcan (naloxone).   If you are at risk for overdose, you may be given a prescription for Narcan. This medicine very quickly reverses the effects of opioids.   If you overdose, a friend or family member can give you Narcan while waiting for the ambulance. They need to know the signs of overdose and how to give Narcan.     Don't use alcohol or street drugs.   Taking them with opioids can cause death.    Do not take any of these medicines unless your doctor says it s OK. Taking these with opioids can cause death:  Benzodiazepines, such as lorazepam (Ativan), alprazolam (Xanax) or diazepam (Valium)  Muscle relaxers, such as cyclobenzaprine (Flexeril)  Sleeping pills like zolpidem (Ambien)   Other opioids      How to keep you and other people safe while taking opioids:    Never share your opioids with others.  Opioid medicines are regulated by the Drug Enforcement Agency (LYDIA). Selling or sharing medications is a criminal act.    2. Be sure to store opioids in a secure place, locked up if possible. Young children can easily swallow them and overdose.    3. When you are traveling with your medicines, keep them in the original bottles. If you use a pill box, be sure you also  carry a copy of your medicine list from your clinic or pharmacy.    4. Safe disposal of opioids    Most pharmacies have places to get rid of medicine, called disposal kiosks. Medicine disposal options are also available in every [facility]. Search your county and  medication disposal  to find more options. You can find more details at:  https://www.pca.[email or web address].[email or web address]./living-green/managing-unwanted-medications     I agree that my provider, clinic care team, and pharmacy may work with any city, state or federal law enforcement agency that investigates the misuse, sale, or other diversion of my controlled medicine. I will allow my provider to discuss my care with, or share a copy of, this agreement with any other treating provider, pharmacy or emergency room where I receive care.    I have read this agreement and have asked questions about anything I did not understand.    _______________________________________________________  Patient Signature - Kristal Lester _____________________                   Date     _______________________________________________________  Provider Signature - Mac Urbina MD   _____________________                   Date     _______________________________________________________  Witness Signature (required if provider not present while patient signing)   _____________________                   Date

## 2025-03-07 NOTE — NURSING NOTE
"Kristal Lester's goals for this visit include:   Chief Complaint   Patient presents with    New Patient     Cervical radicular pain [M54.12]   DDD (degenerative disc disease), cervical [M50.30]          She requests these members of her care team be copied on today's visit information: yes    PCP: Ngozi Cisneros    Referring Provider:  Santiago Gan MD  [address]  [place] [address]    BP 95/58 (BP Location: Left arm, Patient Position: Sitting, Cuff Size: Adult Regular)   Ht 1.651 m (5' 5\")   Wt 70.8 kg (156 lb)   BMI 25.96 kg/m      Do you need any medication refills at today's visit? No  MCKENNA Vasquez, CMA ([facility])      "

## 2025-03-07 NOTE — PATIENT INSTRUCTIONS
Patient Instructions    Surgery scheduled at [facility] ([facility]) for Anterior Cervical Decompression and Fusion with Dr. Urbina    Pre-Operative  Surgical risks: blood clots in the leg or lung, problems urinating, nerve damage, drainage from the incision, infection,stiffness  Pre-operative physical in our Pre-operative Assessment Center (PAC) at our [facility] location. See handout in folder. To be completed within 30 days of surgical date. Surgery scheduler will assist in scheduling this appointment. **May need to be done with a local primary care provider for patients who live several hours away from [place].**  Possibly 2-4 night hospitalization stay (this will also depend on the progress of your recovery)   Shower procedure  Please shower with antimicrobial soap the night before surgery and morning of surgery. Refer to showering instruction handout in folder.  Eating/Drinking  Stop all solid foods 8 hours prior to arrival time of your surgery.  You may drink ONLY CLEAR LIQUIDS up to 2 hour prior to your arrival time  Clear liquids include water, clear juice, black coffee with no milk or creamer, clear tea without milk, Gatorade, clear soda.   **REMINDER: DO NOT drink any clear liquids with pulp, milk, or creamer**   Your surgery may have to be rescheduled if these eating/drinking directions are not followed correctly   Medications  Generally hold Aspirin, NSAIDs (Advil/Ibuprofen, Indocin, Naproxen,Nuprin,Relafen/Nabumetone, Diclofenac,Meloxicam, Aleve, Celebrex) x 7 days prior to surgical date. Please see handout in folder for more details.   You can take Tylenol (Acetaminophen) for pain  Do not exceed 3,000 mg per day   Any other medications prescribed, please discuss at your PAC appointment or with your prescribing provider at your pre-operative physical   You are NOT required to have a COVID test prior to your surgery. However, if you are  experiencing any infection or cold/flu-like symptoms before your procedure, please notify our office right away as surgery may need to be rescheduled.   Please call [phone] if any concerns  You may NOT receive the COVID-19 vaccine 72 hours before or after surgery.    Pain Management  Dealing with pain  As your body heals, you might feel a stabbing, burning, or aching pain. You may also have some numbness.  Everyone feels pain differently, we may ask you to rate your pain using a pain scale. This will let us know how much pain you feel.   Keep in mind that medicine won't take away all of your pain. It helps to try other ways to relax and ease pain.   Things to help with pain  After surgery, we will give you medicine for your pain. These medications work well, but they can make you drowsy, itchy, or sick to your stomach. If we give you narcotics for pain, try to take the pills with food.   For mild to moderate pain, you can take medication such as Tylenol. These can be used with narcotics, but make sure that your narcotic does not contain Tylenol.   Do NOT drive while taking narcotic pain medication  Do NOT drink alcohol while using any pain medication  You can try over the counter Salonpas (4% lidocaine patches) on sore areas after surgery (DO NOT place directly over the incision)   You can take the muscle relaxer with Tylenol to maximize pain control.    May take Senna, Miralax, Metamucil, and Dulcolax to help with constipation. Increasing fiber intake is also helpful.  For spinal fusions: DO NOT take NSAIDS (Advil/Ibuprofen, Naproxen, Motrin, Aleve, Celebrex, etc) for the first 3 months after surgery, unless directed by your doctor.  You can utilize ice as needed along the sides of your incision to alleviate pain/swelling (no longer than 20 minutes at one time)  **Please let us know at least 3 days in advance before you run out of your pain medications so these can be refilled in a timely manner. You may send  a my chart message or call [phone] to leave a message with our care team.**    Incision Care  No submerging incision in water such as pools, hot tubs, baths for at least 6-8 weeks or until incision is healed  You may get your incision wet in the shower. Allow water to run over incision, and gently pat dry. You can cover the bandage/incision with a waterproof dressing or Saran wrap if the incision isn't fully healed or if there are areas of concern.   Remove dressing/bandage as instructed upon discharge. You do not have to cover your incision during showers after the bandage is removed.   If you have some drainage, you can place a clean guaze over the incision. This is normal and should clear up as the incision closes.   Watch for signs of infection  Redness, swelling, warmth, drainage, and fever of 101 degrees or higher  Notify clinic [phone]  If skin glue is used for your incision, this will be examined at 6 weeks post-operatively. Sutures that need to be removed will be examined at 3 weeks.   After surgery, please wear loose fitting clothing to avoid pressure on your incision site   We would recommend protein intake to help with wound healing after surgery. Dietary supplements can include Boost or Ensure protein shakes.   For spinal fusions: we would recommend taking a Vitamin D supplement (2,000 international units) for 6 months after surgery to help bone fusion.     Activity Restrictions  For the first 6-8 weeks, no lifting > 10 pounds, no excessive bending, twisting, or overhead reaching.  Take stairs in moderation   Ok to walk as tolerated, take short frequent walks. You may gradually increase the distance as tolerated.   Avoid bed rest (laying flat on your back) and prolonged sitting for longer than 30 minutes (change positions frequently while awake). Sleep on your side for one month after surgery.   No contact sports until after follow up visit  No high impact activities such as; running/jogging,  snowmobile or 4 berry riding or any other recreational vehicles  Please call the clinic if you develop any of the following symptoms:  Swelling and/or warmth in one or both legs  Pain or tenderness in your leg, ankle, foot, or arm   Red or discolored skin     Post-Op Follow Up Appointments  6 week post op with xray prior with Dr. Urbina  Remaining appointments will be determined by the provider   Please call to schedule follow up and xray appointments at [phone]    Resources  If you are currently employed, you will need to be off work for 4-6 weeks for post op recovery and healing.  Please fax any FMLA/short term disability paperwork to [phone]  You may call our clinic when you'd like to return to work and we can provide a work letter  To allow staff adequate time to complete paperwork, please send as soon as possible     [facility] Orth[facility] Department   Phone: [phone]

## 2025-03-07 NOTE — LETTER
3/7/2025      Kristal Lester  [address] [place] [address]      Dear Colleague,    Thank you for referring your patient, Kristal Lester, to the [facility] [place]. Please see a copy of my visit note below.    Spine Surgery Return Clinic Visit      Chief Complaint:   New Patient (Cervical radicular pain [M54.12] /DDD (degenerative disc disease), cervical [M50.30] /)      Interval HPI:  Symptom Profile Including: location of symptoms, onset, severity, exacerbating/alleviating factors, previous treatments:        Kristal Lester is a 64 year old female who presents for evaluation of chronic neck pain.  Patient says that she has been dealing with this pain for more than 3 years.  She has been attempting to alleviate the pain with various nonoperative treatments (managed by Dr. Gan) without significant improvement.  Her last course of physical therapy was just over a year ago, but did not provide significant long-term relief.  She has had injections into her neck which only help temporarily.  She takes meloxicam which only provides mild relief.  Her pain is localized to the neck with radiation into bilateral paraspinals.  Pain is worse with trying to rotate the neck side-to-side and extending backwards.  She is extremely limited with lateral range of motion, and cannot easily check her blind spots when driving due to the pain.  Additionally, when she is driving with her hands up on the steering well, both of her hands will become entirely numb.  She otherwise denies pain radiating down the arms.  She does have pain in her left dorsal hand which she has also had for a very long time; reports that she had an injection into that hand previously which was helpful.  Also has chronic right elbow pain, history of tennis elbow surgery and has pain since then.  She denies difficulty with balance.  Denies difficulty with dropping things or with dexterity.  Denies weakness in  the arms or legs or difficulty with bowel and bladder.  Admits to benign essential tremor which is treated with a beta-blocker.    Does continue to have right SI joint pain. Managed by Dr. Hay with pain team.            Past Medical History:     Past Medical History:   Diagnosis Date     Benign paroxysmal positional vertigo, left 7/10/2019     HLD (hyperlipidemia)      PVC's (premature ventricular contractions)      SI (sacroiliac) joint dysfunction             Past Surgical History:     Past Surgical History:   Procedure Laterality Date     Bunion surgery  2017      SECTION  5 years ago, 35 years ago     ELBOW SURGERY Right      MAMMOPLASTY AUGMENTATION  23 years ago     OPTICAL TRACKING SYSTEM FUSION REVISION SACRAL ILIAC N/A 2021    Procedure: Revision right sacroiliac joint fusion with iliosacral screws and open posterior joint debridement and grafting, use of O-Arm/stealth navigation, allograft, local autograft, and bone morphogenic;  Surgeon: Mac Urbina MD;  Location: [facility] OR     OPTICAL TRACKING SYSTEM FUSION SACRAL ILIAC Right 2019    Procedure: Right Minimal Invasive Sacral Iliac Joint Fusion;  Surgeon: Mac Urbina MD;  Location: [facility] OR            Social History:     Social History     Tobacco Use     Smoking status: Never     Smokeless tobacco: Never   Substance Use Topics     Alcohol use: Not Currently            Family History:     Family History   Problem Relation Age of Onset     Chronic Obstructive Pulmonary Disease Father      Heart Disease Father      Hyperlipidemia Father      Scleroderma Mother      Mental Illness Brother      Alzheimer Disease Maternal Grandmother      Parkinsonism Maternal Grandfather      Anesthesia Reaction No family hx of      Deep Vein Thrombosis (DVT) No family hx of             Allergies:     Allergies   Allergen Reactions     Bees Shortness Of Breath     Cellulitis of the leg            Medications:     Current  Outpatient Medications   Medication Sig Dispense Refill     acetaminophen (TYLENOL) 325 MG tablet Take 2 tablets (650 mg) by mouth every 4 hours as needed for other (mild pain) 100 tablet 0     Calcium 200 MG TABS Take by mouth every morning        celecoxib (CELEBREX) 100 MG capsule TAKE 1 CAPSULE BY MOUTH TWICE A DAY 60 capsule 0     cyclobenzaprine (FLEXERIL) 5 MG tablet Take 1-2 tablets (5-10 mg) by mouth nightly as needed for muscle spasms 45 tablet 3     cyclobenzaprine (FLEXERIL) 5 MG tablet Take 1-2 tablets (5-10 mg) by mouth nightly as needed for muscle spasms 30 tablet 1     diphenhydrAMINE-APAP, sleep, (TYLENOL PM EXTRA STRENGTH PO) Take by mouth daily as needed        EPINEPHrine (EPIPEN/ADRENACLICK/OR ANY BX GENERIC EQUIV) 0.3 MG/0.3ML injection 2-pack Inject 0.3 mg into the muscle as needed for anaphylaxis       escitalopram (LEXAPRO) 10 MG tablet Take 10 mg by mouth every morning        estradiol (VIVELLE-DOT) 0.0375 MG/24HR BIW patch Place 1 patch onto the skin twice a week.       FLUoxetine (PROZAC) 40 MG capsule Take 40 mg by mouth daily.       gabapentin (NEURONTIN) 100 MG capsule        meloxicam (MOBIC) 15 MG tablet Take 1 tablet (15 mg) by mouth daily. 90 tablet 1     meloxicam (MOBIC) 15 MG tablet Take 1 tablet (15 mg) by mouth daily 30 tablet 2     meloxicam (MOBIC) 15 MG tablet Take 1 tablet (15 mg) by mouth daily 30 tablet 0     order for DME SI-loc belt 1 Units 0     oxyCODONE (ROXICODONE) 5 MG tablet Take 1-2 tablets (5-10 mg) by mouth every 4 hours as needed for pain (Moderate to Severe) 30 tablet 0     polyethylene glycol (MIRALAX) 17 g packet Take 17 g by mouth daily 7 packet 0     progesterone (PROMETRIUM) 200 MG capsule Take 200 mg by mouth daily.       propranolol ER (INDERAL LA) 80 MG 24 hr capsule Take 80 mg by mouth daily.       rosuvastatin (CRESTOR) 10 MG tablet Take 10 mg by mouth At Bedtime       senna-docusate (SENOKOT-S/PERICOLACE) 8.6-50 MG tablet Take 1-2 tablets by mouth  "2 times daily Take while on oral narcotics to prevent or treat constipation. 30 tablet 0     vitamin D3 (CHOLECALCIFEROL) 2000 units (50 mcg) tablet Take 1 tablet by mouth every morning        WEGOVY 1.7 MG/0.75ML pen Inject 1.7 mg subcutaneously every 7 days.       No current facility-administered medications for this visit.             Review of Systems:   A focused musculoskeletal and neurologic ROS was performed with pertinent positives and negatives noted in the HPI.  Additional systems were also reviewed and are documented at the bottom of the note.         Physical Exam:   Vitals: BP 95/58 (BP Location: Left arm, Patient Position: Sitting, Cuff Size: Adult Regular)   Ht 1.651 m (5' 5\")   Wt 70.8 kg (156 lb)   BMI 25.96 kg/m    PHYSICAL EXAM:   Constitutional - Patient is healthy, well-nourished and appears stated age.   Respiratory - Patient is breathing normally and in no respiratory distress.   Skin - No suspicious rashes or lesions.   Psychiatric - Normal mood and affect.   Cardiovascular - Extremities warm and well perfused.   Eyes - Visual acuity is normal to the written word.   ENT - Hearing intact to the spoken word.   GI - No abdominal distention.   Musculoskeletal - Non-antalgic gait without use of assistive devices.  Mild instability with Romberg's.  Mild instability with tandem gait.  Full, painless shoulder range of motion.        Cervical spine:    Appearance - Normal     ROM -limited, painful lateral rotation and extension.  Full, painless flexion    Motor -     UPPER EXTREMITY Left Right    strength 5/5 5/5   Finger ab/adduction 5/5 5/5   Wrist flexion 5/5 5/5   Wrist extension 5/5 5/5   Elbow flexion 5/5 5/5   Elbow extension 5/5 5/5   Shoulder abduction 5/5 5/5           Neurologic - Sensation intact to light touch bilaterally UE.      REFLEXES Left Right   Biceps 2+ 2+   Triceps 2+ 2+   Brachioradialis 1+ 1+   hoffmans negative Intermittently positive       Alignment:  Patient stands " with a neutral standing sagittal balance.         Imaging:   We ordered and independently reviewed new radiographs at this clinic visit. The results were discussed with the patient. Findings include:     3/7/2025 XR cervical spine AP/lateral flexion/extension views:Multilevel degenerative changes.  Loss of cervical lordosis.  No significant instability between flexion and extension views.  No spondylolisthesis.    2/21/2025 MRI cervical spine without contrast: multilevel degenerative changes, most significant C4-7.  No significant spinal canal stenosis.  C5-6 and C4-5 degeneration with some flattening of spinal cord and with left>right foraminal stenosis.     Assessment and Plan:     64 year old female with chronic axial neck pain, cervical spondylosis C4-7 with cervical kyphosis and radiculopathy     Reviewed patient's on XR and MRI images with her today to help explain likely source of chronic neck pain and arm symptoms.  Reviewed that she has multilevel degenerative changes, development of some mild/moderate cervical kyphosis, some areas of moderate foraminal stenosis and cord shape change.  Reviewed that treatments for this condition are either nonoperative or operative.  Patient has exhausted nonoperative treatments over the past several years including physical therapy, injections, medications, lifestyle adjustments without improvement.  She feels very debilitated by the pain.  She is interested in considering surgical intervention.  Reviewed that surgery would involve anterior fusion C4-7 with or without iliac crest graft.  Patient would like to avoid iliac crest graft because this is where her SI joint pain persists; so we will discuss this in the future.  Patient would like to take some more time to consider pursuing this surgery.  We will have our team call her next week to review whether she would like to proceed or not.  Would need cervical CT for surgical planning and preanesthesia H&P.    Risks of this  surgery include risk of infection, risk of dural tear resulting in CSF leak which might result in headaches, or possible need for lumbar drain, or possible revision surgery in the setting of a persistent leak. Possible nerve root injury resulting in numbness weakness or paralysis into the arms or legs. Possible radiculitis which could result in similar symptoms or could result in significant neurogenic type pain. Risk of incomplete decompression which might require revision surgery in the future.  Risk of adjacent segment problems requiring surgery in the future. Risk of incomplete relief of symptoms possibly requiring revision surgery in the future. Furthermore, although rare, there are risks of major vessel injury such as to the vertebral artery or major organ injury such as to the esophagus or trachea from the surgery.  There are risks of dysphagia or dysphonia which can make it hard to swallow or talk. I explained that in fact most patients will have some period of swallowing difficulty following the surgery.  In some cases these things occur as a result of laryngeal nerve injury, and we discussed this possibility as well. There is a risk of hematoma formation requiring urgent return to the OR for airway compromise.  There is a risk of blood clots in the legs or the lungs.  Lastly, although rare, there are certainly risks of the anesthetic including stroke heart attack and death.    For anterior cervical fusions we use nuvasive screws, plates and interbody devices.      For bone graft we plan to use iliac crest autograft which is harvested through a separate skin and fascial incision.  I have discussed the risks and benefits of these and explained that allograft has a slightly lower healing rate.  Autograft has a higher healing rate, but requires a separate skin and fascial incision and tends to have more discomfort or pain in the recovery.  The patient understands these risks and benefits of the choice.        No further conservative care is indicated, and the surgery is medically necessary for the following reasons:    There is no indication for further physical therapy in this case.  Further physical therapy would delay necessary medical treatment and prolong the patient's suffering with no benefit and the delay would increase the risk of neurologic decline and/or symptom worsening unnecessarily, with no benefit to the patient and possible harm.       The patient has already trialed oral medications as listed in the medication history, and has trialed activity modification such as decreasing their bending and twisting, and decreasing their walking distance.  In spite of this, and in spite of the conservative therapies documented above, the patient has significant functional disability in their activities of daily living such as prolonged sitting and standing, prolonged walking, and daily chores, each of which are very difficult or painful because of this spinal problem.  Therefore the proposed surgery is medically necessary and the patient has failed conservative care.      There are no untreated, underlying mental health conditions (including but not limited to psychological conditions or drug or alcohol abuse) that will preclude an appropriate recovery from this surgery or that are contraindications to proceeding with surgery.        The patient does not have any untreated, underlying mental health conditions or issues which are a major contributor to their chronic pain.    - CT cervical without contrast  - PAC H&P  - Case request ACDF C4-7 withleft iliac crest graft    Attending MD (Dr. Mac Urbina) : I personally performed greater than 50% of the effort related to this patient visit and am responsible for the medical decision making and billing in this case.  I met with the patient, reviewed and verified the history and physical exam of the patient and discussed the patient's management with the other  clinical providers involved in this patient's care including any involved residents or physicians assistants. I also personally reviewed the imaging and I personally formulated the treatment plan and diagnosis in their entirety.  I reviewed the above note and agree with the documented findings and plan of care, which were communicated to the patient.      Mac Urbina MD      Patient seen and plan discussed with Dr. Urbina.   Claudia Escobar (zuleima Logan PA-C    Respectfully,  Mac Urbina MD  Spine Surgery  [facility]        PRE-SURGERY EDUCATION  Reviewed pre- and post-operative instructions as outlined in the After Visit Summary/Patient Instructions with patient.   Surgery folder was given to patient- called on 3/10 and completed education over the phone     Patient Education Topic: Procedure with Dr. Urbina   Learner(s): Patient  Knowledge Level: Basic  Readiness to Learn: Ready  Method:  Verbal explanation and Written material   Outcome: Able to verbalize instructions  Barriers to Learning: No barrier    STD/FMLA: No  Job Description: Not applicable   Time Off: Not applicable      Patient had the opportunity for questions to be answered. Advised Patient to call clinic with any questions/concerns. Gave patient antibacterial soap for pre-surgery skin preparation.     FUSION SPINE PRE-SURGICAL CHECKLIST   Intervention/Diagnostic Meets Criteria Details   NDI/NIKHIL  Completed within the last 6 months Yes Confirmation of completion within last 6 months   Nicotine Status  Never      Yes Fusions  - Used nicotine within the last year? No  - If patient uses nicotine, RN orders a Nicotine lab test to be completed 2 weeks after smoking cessation. Surgery will need to be scheduled at least 3 weeks after the nicotine lab test   Physical Therapy   Completed within 6 months   Completed on the corresponding body part  Completed at least 4 weeks (unless provider documented that patient unable to  tolerate PT) Yes  Records verified and located N/A    PT completed over a year ago- did not provide relief   Injection  Completed within last 1 years  Completed on the corresponding body part   Yes  Records verified and located Internal   Imaging  MRI or CT completed within 6 months Yes Records verified and located Internal   Chronic Pain or Pain contract  No Yes If yes: RN needs to collect Pain Provider name, organization, last office visit note, and connect with provider to share plan leading into surgery and planned post-op pain medication timeframe. For involved chronic pain patients, RN to update [facility] team using <dot>PAINMANAGEMENTSURGERY within separate telephone encounter     Tiffany DENNY RN, BSN  [facility] Neurology        Again, thank you for allowing me to participate in the care of your patient.        Sincerely,        Mac Urbina MD    Electronically signed

## 2025-03-07 NOTE — PROGRESS NOTES
Spine Surgery Return Clinic Visit      Chief Complaint:   New Patient (Cervical radicular pain [M54.12] /DDD (degenerative disc disease), cervical [M50.30] /)      Interval HPI:  Symptom Profile Including: location of symptoms, onset, severity, exacerbating/alleviating factors, previous treatments:        Kristal Lester is a 64 year old female who presents for evaluation of chronic neck pain.  Patient says that she has been dealing with this pain for more than 3 years.  She has been attempting to alleviate the pain with various nonoperative treatments (managed by Dr. Gan) without significant improvement.  Her last course of physical therapy was just over a year ago, but did not provide significant long-term relief.  She has had injections into her neck which only help temporarily.  She takes meloxicam which only provides mild relief.  Her pain is localized to the neck with radiation into bilateral paraspinals.  Pain is worse with trying to rotate the neck side-to-side and extending backwards.  She is extremely limited with lateral range of motion, and cannot easily check her blind spots when driving due to the pain.  Additionally, when she is driving with her hands up on the steering well, both of her hands will become entirely numb.  She otherwise denies pain radiating down the arms.  She does have pain in her left dorsal hand which she has also had for a very long time; reports that she had an injection into that hand previously which was helpful.  Also has chronic right elbow pain, history of tennis elbow surgery and has pain since then.  She denies difficulty with balance.  Denies difficulty with dropping things or with dexterity.  Denies weakness in the arms or legs or difficulty with bowel and bladder.  Admits to benign essential tremor which is treated with a beta-blocker.    Does continue to have right SI joint pain. Managed by Dr. Hay with pain team.            Past Medical History:     Past  Medical History:   Diagnosis Date    Benign paroxysmal positional vertigo, left 7/10/2019    HLD (hyperlipidemia)     PVC's (premature ventricular contractions)     SI (sacroiliac) joint dysfunction             Past Surgical History:     Past Surgical History:   Procedure Laterality Date    Bunion surgery  2017     SECTION  5 years ago, 35 years ago    ELBOW SURGERY Right     MAMMOPLASTY AUGMENTATION  23 years ago    OPTICAL TRACKING SYSTEM FUSION REVISION SACRAL ILIAC N/A 2021    Procedure: Revision right sacroiliac joint fusion with iliosacral screws and open posterior joint debridement and grafting, use of O-Arm/stealth navigation, allograft, local autograft, and bone morphogenic;  Surgeon: Mac Urbina MD;  Location: [facility] OR    OPTICAL TRACKING SYSTEM FUSION SACRAL ILIAC Right 2019    Procedure: Right Minimal Invasive Sacral Iliac Joint Fusion;  Surgeon: Mac Urbina MD;  Location: [facility] OR            Social History:     Social History     Tobacco Use    Smoking status: Never    Smokeless tobacco: Never   Substance Use Topics    Alcohol use: Not Currently            Family History:     Family History   Problem Relation Age of Onset    Chronic Obstructive Pulmonary Disease Father     Heart Disease Father     Hyperlipidemia Father     Scleroderma Mother     Mental Illness Brother     Alzheimer Disease Maternal Grandmother     Parkinsonism Maternal Grandfather     Anesthesia Reaction No family hx of     Deep Vein Thrombosis (DVT) No family hx of             Allergies:     Allergies   Allergen Reactions    Bees Shortness Of Breath     Cellulitis of the leg            Medications:     Current Outpatient Medications   Medication Sig Dispense Refill    acetaminophen (TYLENOL) 325 MG tablet Take 2 tablets (650 mg) by mouth every 4 hours as needed for other (mild pain) 100 tablet 0    Calcium 200 MG TABS Take by mouth every morning       celecoxib (CELEBREX) 100 MG capsule TAKE  1 CAPSULE BY MOUTH TWICE A DAY 60 capsule 0    cyclobenzaprine (FLEXERIL) 5 MG tablet Take 1-2 tablets (5-10 mg) by mouth nightly as needed for muscle spasms 45 tablet 3    cyclobenzaprine (FLEXERIL) 5 MG tablet Take 1-2 tablets (5-10 mg) by mouth nightly as needed for muscle spasms 30 tablet 1    diphenhydrAMINE-APAP, sleep, (TYLENOL PM EXTRA STRENGTH PO) Take by mouth daily as needed       EPINEPHrine (EPIPEN/ADRENACLICK/OR ANY BX GENERIC EQUIV) 0.3 MG/0.3ML injection 2-pack Inject 0.3 mg into the muscle as needed for anaphylaxis      escitalopram (LEXAPRO) 10 MG tablet Take 10 mg by mouth every morning       estradiol (VIVELLE-DOT) 0.0375 MG/24HR BIW patch Place 1 patch onto the skin twice a week.      FLUoxetine (PROZAC) 40 MG capsule Take 40 mg by mouth daily.      gabapentin (NEURONTIN) 100 MG capsule       meloxicam (MOBIC) 15 MG tablet Take 1 tablet (15 mg) by mouth daily. 90 tablet 1    meloxicam (MOBIC) 15 MG tablet Take 1 tablet (15 mg) by mouth daily 30 tablet 2    meloxicam (MOBIC) 15 MG tablet Take 1 tablet (15 mg) by mouth daily 30 tablet 0    order for DME SI-loc belt 1 Units 0    oxyCODONE (ROXICODONE) 5 MG tablet Take 1-2 tablets (5-10 mg) by mouth every 4 hours as needed for pain (Moderate to Severe) 30 tablet 0    polyethylene glycol (MIRALAX) 17 g packet Take 17 g by mouth daily 7 packet 0    progesterone (PROMETRIUM) 200 MG capsule Take 200 mg by mouth daily.      propranolol ER (INDERAL LA) 80 MG 24 hr capsule Take 80 mg by mouth daily.      rosuvastatin (CRESTOR) 10 MG tablet Take 10 mg by mouth At Bedtime      senna-docusate (SENOKOT-S/PERICOLACE) 8.6-50 MG tablet Take 1-2 tablets by mouth 2 times daily Take while on oral narcotics to prevent or treat constipation. 30 tablet 0    vitamin D3 (CHOLECALCIFEROL) 2000 units (50 mcg) tablet Take 1 tablet by mouth every morning       WEGOVY 1.7 MG/0.75ML pen Inject 1.7 mg subcutaneously every 7 days.       No current facility-administered  "medications for this visit.             Review of Systems:   A focused musculoskeletal and neurologic ROS was performed with pertinent positives and negatives noted in the HPI.  Additional systems were also reviewed and are documented at the bottom of the note.         Physical Exam:   Vitals: BP 95/58 (BP Location: Left arm, Patient Position: Sitting, Cuff Size: Adult Regular)   Ht 1.651 m (5' 5\")   Wt 70.8 kg (156 lb)   BMI 25.96 kg/m    PHYSICAL EXAM:   Constitutional - Patient is healthy, well-nourished and appears stated age.   Respiratory - Patient is breathing normally and in no respiratory distress.   Skin - No suspicious rashes or lesions.   Psychiatric - Normal mood and affect.   Cardiovascular - Extremities warm and well perfused.   Eyes - Visual acuity is normal to the written word.   ENT - Hearing intact to the spoken word.   GI - No abdominal distention.   Musculoskeletal - Non-antalgic gait without use of assistive devices.  Mild instability with Romberg's.  Mild instability with tandem gait.  Full, painless shoulder range of motion.        Cervical spine:    Appearance - Normal     ROM -limited, painful lateral rotation and extension.  Full, painless flexion    Motor -     UPPER EXTREMITY Left Right    strength 5/5 5/5   Finger ab/adduction 5/5 5/5   Wrist flexion 5/5 5/5   Wrist extension 5/5 5/5   Elbow flexion 5/5 5/5   Elbow extension 5/5 5/5   Shoulder abduction 5/5 5/5           Neurologic - Sensation intact to light touch bilaterally UE.      REFLEXES Left Right   Biceps 2+ 2+   Triceps 2+ 2+   Brachioradialis 1+ 1+   hoffmans negative Intermittently positive       Alignment:  Patient stands with a neutral standing sagittal balance.         Imaging:   We ordered and independently reviewed new radiographs at this clinic visit. The results were discussed with the patient. Findings include:     3/7/2025 XR cervical spine AP/lateral flexion/extension views:Multilevel degenerative changes.  " Loss of cervical lordosis.  No significant instability between flexion and extension views.  No spondylolisthesis.    2/21/2025 MRI cervical spine without contrast: multilevel degenerative changes, most significant C4-7.  No significant spinal canal stenosis.  C5-6 and C4-5 degeneration with some flattening of spinal cord and with left>right foraminal stenosis.     Assessment and Plan:     64 year old female with chronic axial neck pain, cervical spondylosis C4-7 with cervical kyphosis and radiculopathy     Reviewed patient's on XR and MRI images with her today to help explain likely source of chronic neck pain and arm symptoms.  Reviewed that she has multilevel degenerative changes, development of some mild/moderate cervical kyphosis, some areas of moderate foraminal stenosis and cord shape change.  Reviewed that treatments for this condition are either nonoperative or operative.  Patient has exhausted nonoperative treatments over the past several years including physical therapy, injections, medications, lifestyle adjustments without improvement.  She feels very debilitated by the pain.  She is interested in considering surgical intervention.  Reviewed that surgery would involve anterior fusion C4-7 with or without iliac crest graft.  Patient would like to avoid iliac crest graft because this is where her SI joint pain persists; so we will discuss this in the future.  Patient would like to take some more time to consider pursuing this surgery.  We will have our team call her next week to review whether she would like to proceed or not.  Would need cervical CT for surgical planning and preanesthesia H&P.    Risks of this surgery include risk of infection, risk of dural tear resulting in CSF leak which might result in headaches, or possible need for lumbar drain, or possible revision surgery in the setting of a persistent leak. Possible nerve root injury resulting in numbness weakness or paralysis into the arms or  legs. Possible radiculitis which could result in similar symptoms or could result in significant neurogenic type pain. Risk of incomplete decompression which might require revision surgery in the future.  Risk of adjacent segment problems requiring surgery in the future. Risk of incomplete relief of symptoms possibly requiring revision surgery in the future. Furthermore, although rare, there are risks of major vessel injury such as to the vertebral artery or major organ injury such as to the esophagus or trachea from the surgery.  There are risks of dysphagia or dysphonia which can make it hard to swallow or talk. I explained that in fact most patients will have some period of swallowing difficulty following the surgery.  In some cases these things occur as a result of laryngeal nerve injury, and we discussed this possibility as well. There is a risk of hematoma formation requiring urgent return to the OR for airway compromise.  There is a risk of blood clots in the legs or the lungs.  Lastly, although rare, there are certainly risks of the anesthetic including stroke heart attack and death.    For anterior cervical fusions we use nuvasive screws, plates and interbody devices.      For bone graft we plan to use iliac crest autograft which is harvested through a separate skin and fascial incision.  I have discussed the risks and benefits of these and explained that allograft has a slightly lower healing rate.  Autograft has a higher healing rate, but requires a separate skin and fascial incision and tends to have more discomfort or pain in the recovery.  The patient understands these risks and benefits of the choice.       No further conservative care is indicated, and the surgery is medically necessary for the following reasons:    There is no indication for further physical therapy in this case.  Further physical therapy would delay necessary medical treatment and prolong the patient's suffering with no benefit and  the delay would increase the risk of neurologic decline and/or symptom worsening unnecessarily, with no benefit to the patient and possible harm.       The patient has already trialed oral medications as listed in the medication history, and has trialed activity modification such as decreasing their bending and twisting, and decreasing their walking distance.  In spite of this, and in spite of the conservative therapies documented above, the patient has significant functional disability in their activities of daily living such as prolonged sitting and standing, prolonged walking, and daily chores, each of which are very difficult or painful because of this spinal problem.  Therefore the proposed surgery is medically necessary and the patient has failed conservative care.      There are no untreated, underlying mental health conditions (including but not limited to psychological conditions or drug or alcohol abuse) that will preclude an appropriate recovery from this surgery or that are contraindications to proceeding with surgery.        The patient does not have any untreated, underlying mental health conditions or issues which are a major contributor to their chronic pain.    - CT cervical without contrast  - PAC H&P  - Case request ACDF C4-7 withleft iliac crest graft    Attending MD (Dr. Mac Urbina) : I personally performed greater than 50% of the effort related to this patient visit and am responsible for the medical decision making and billing in this case.  I met with the patient, reviewed and verified the history and physical exam of the patient and discussed the patient's management with the other clinical providers involved in this patient's care including any involved residents or physicians assistants. I also personally reviewed the imaging and I personally formulated the treatment plan and diagnosis in their entirety.  I reviewed the above note and agree with the documented findings and plan  of care, which were communicated to the patient.      Mac Urbina MD      Patient seen and plan discussed with Dr. Urbina.   Claudia Escobar (zuleima Milian), VIK    Respectfully,  Mac Urbina MD  Spine Surgery  [facility]

## 2025-03-10 ENCOUNTER — MYC MEDICAL ADVICE (OUTPATIENT)
Dept: NEUROSURGERY | Facility: CLINIC | Age: 65
End: 2025-03-10
Payer: COMMERCIAL

## 2025-03-10 ENCOUNTER — TELEPHONE (OUTPATIENT)
Dept: NEUROSURGERY | Facility: CLINIC | Age: 65
End: 2025-03-10
Payer: COMMERCIAL

## 2025-03-10 NOTE — PROGRESS NOTES
PRE-SURGERY EDUCATION  Reviewed pre- and post-operative instructions as outlined in the After Visit Summary/Patient Instructions with patient.   Surgery folder was given to patient- called on 3/10 and completed education over the phone     Patient Education Topic: Procedure with Dr. Urbina   Learner(s): Patient  Knowledge Level: Basic  Readiness to Learn: Ready  Method:  Verbal explanation and Written material   Outcome: Able to verbalize instructions  Barriers to Learning: No barrier    STD/FMLA: No  Job Description: Not applicable   Time Off: Not applicable      Patient had the opportunity for questions to be answered. Advised Patient to call clinic with any questions/concerns. Gave patient antibacterial soap for pre-surgery skin preparation.     FUSION SPINE PRE-SURGICAL CHECKLIST   Intervention/Diagnostic Meets Criteria Details   NDI/NIKHIL  Completed within the last 6 months Yes Confirmation of completion within last 6 months   Nicotine Status  Never      Yes Fusions  - Used nicotine within the last year? No  - If patient uses nicotine, RN orders a Nicotine lab test to be completed 2 weeks after smoking cessation. Surgery will need to be scheduled at least 3 weeks after the nicotine lab test   Physical Therapy   Completed within 6 months   Completed on the corresponding body part  Completed at least 4 weeks (unless provider documented that patient unable to tolerate PT) Yes  Records verified and located N/A    PT completed over a year ago- did not provide relief   Injection  Completed within last 1 years  Completed on the corresponding body part   Yes  Records verified and located Internal   Imaging  MRI or CT completed within 6 months Yes Records verified and located Internal   Chronic Pain or Pain contract  No Yes If yes: RN needs to collect Pain Provider name, organization, last office visit note, and connect with provider to share plan leading into surgery and planned post-op pain medication timeframe. For  involved chronic pain patients, RN to update [facility] team using <dot>PAINMANAGEMENTSURGERY within separate telephone encounter     Tiffany DENNY RN, BSN  [facility]

## 2025-03-10 NOTE — TELEPHONE ENCOUNTER
Patient is scheduled for surgery with Dr. Urbina.     Spoke with: Patient     Date of Surgery: 6/04/25, patient asked to be scheduled into June specifically.     Location: [facility] OR     Pre op with Provider: ROME     H&P: Scheduled for an in clinic visit with PAC on 5/20/25 at 9:00.     Additional imaging/appointments: Patient is scheduled for a 6 wk post op on 7/18/25 at 9:00.   Patient is scheduled for CT on 3/18/25 at 5:00 in [facility].     Surgery packet: Patient received packet in clinic.      Additional comments: ROME Garcia on 3/10/2025 at 4:06 PM

## 2025-03-11 NOTE — TELEPHONE ENCOUNTER
FUTURE VISIT INFORMATION      SURGERY INFORMATION:  Date: 25  [facility]  Surgeon:  Mac Urbina MD   Anesthesia Type:  general  Procedure: Cervical 4 to 7 Anterior Cervical Decompression and Fusion with Nuvasive Plate and Screws, Interbody Device, use of Fluoroscopy, Microscope, and Left Sided Iliac Crest Autograft   Consult: ov 3/4/25    RECORDS REQUESTED FROM:       Primary Care Provider: [facility]    Most recent EKG+ Tracin21    Most recent Sleep Study:  6/15/23- [employer]

## 2025-03-18 ENCOUNTER — ANCILLARY PROCEDURE (OUTPATIENT)
Dept: CT IMAGING | Facility: CLINIC | Age: 65
End: 2025-03-18
Attending: PHYSICIAN ASSISTANT
Payer: COMMERCIAL

## 2025-03-18 DIAGNOSIS — R20.0 NUMBNESS AND TINGLING IN BOTH HANDS: ICD-10-CM

## 2025-03-18 DIAGNOSIS — M48.02 SPINAL STENOSIS IN CERVICAL REGION: ICD-10-CM

## 2025-03-18 DIAGNOSIS — M50.30 DDD (DEGENERATIVE DISC DISEASE), CERVICAL: ICD-10-CM

## 2025-03-18 DIAGNOSIS — R20.2 NUMBNESS AND TINGLING IN BOTH HANDS: ICD-10-CM

## 2025-03-18 DIAGNOSIS — M40.292 OTHER KYPHOSIS OF CERVICAL REGION: ICD-10-CM

## 2025-03-18 DIAGNOSIS — M54.12 CERVICAL RADICULAR PAIN: ICD-10-CM

## 2025-03-18 PROCEDURE — 72125 CT NECK SPINE W/O DYE: CPT | Performed by: RADIOLOGY

## 2025-05-20 ENCOUNTER — PRE VISIT (OUTPATIENT)
Dept: SURGERY | Facility: CLINIC | Age: 65
End: 2025-05-20

## 2025-05-21 NOTE — CONFIDENTIAL NOTE
FUTURE VISIT INFORMATION      SURGERY INFORMATION:  Date: 2025   Location:  [facility]   Surgeon:  Mac Urbina MD   Anesthesia Type:  General  Procedure: Cervical 4 to 7 Anterior Cervical       RECORDS REQUESTED FROM:         Pertinent Medical History: Hyperlipidemia     Most recent EKG+ [id number]     Most recent ECHO: 2017 [facility]    Most recent Cardiac Stress Test:  2019    Most recent Coronary Angiogram:

## 2025-06-23 ENCOUNTER — TRANSCRIBE ORDERS (OUTPATIENT)
Dept: OTHER | Age: 65
End: 2025-06-23

## 2025-06-23 DIAGNOSIS — H91.93 HEARING DIFFICULTY OF BOTH EARS: Primary | ICD-10-CM

## 2025-06-25 ENCOUNTER — PATIENT OUTREACH (OUTPATIENT)
Dept: CARE COORDINATION | Facility: CLINIC | Age: 65
End: 2025-06-25
Payer: COMMERCIAL

## 2025-07-10 ENCOUNTER — TELEPHONE (OUTPATIENT)
Dept: ORTHOPEDICS | Facility: CLINIC | Age: 65
End: 2025-07-10
Payer: COMMERCIAL

## 2025-07-19 ENCOUNTER — HEALTH MAINTENANCE LETTER (OUTPATIENT)
Age: 65
End: 2025-07-19

## 2025-07-24 ENCOUNTER — OFFICE VISIT (OUTPATIENT)
Dept: AUDIOLOGY | Facility: CLINIC | Age: 65
End: 2025-07-24
Payer: COMMERCIAL

## 2025-07-24 DIAGNOSIS — H90.3 SENSORINEURAL HEARING LOSS, ASYMMETRICAL: Primary | ICD-10-CM

## 2025-07-24 NOTE — PROGRESS NOTES
AUDIOLOGY REPORT    SUBJECTIVE: Kristal Lester is a 65 year old female was seen in the Audiology Clinic at [facility] on 7/24/25 to discuss concerns with hearing and functional communication difficulties. Kristal has been seen previously on 6/27/2025, and results revealed a bilateral asymmetric sensorineural hearing loss. Kristal notes difficulty with communication in a variety of listening situations. Patient was unaccompanied to today's visit.     OBJECTIVE:  Patient is a hearing aid candidate. Patient would like to move forward with a hearing aid evaluation today. Therefore, the patient was presented with different options for amplification to help aid in communication. Discussed styles, levels of technology and monaural vs. binaural fitting.     The hearing aid(s) mutually chosen were:  Left: Oticon Real 3R  COLOR: 93   BATTERY SIZE: rechargeable  EARMOLD/TIPS: 8 mm open dome  CANAL/ LENGTH: 2 60 dB     ASSESSMENT:   Bilateral asymmetric sensorineural hearing loss      Reviewed purchase information and warranty information with patient. The 45 day trial period was explained to patient. The patient was given a copy of the [place] Department [employer] consumer brochure on purchasing hearing instruments. Patient risk factors have been provided to the patient in writing prior to the sale of the hearing aid per FDA regulation. The risk factors are also available in the User Instructional Booklet to be presented on the day of the hearing aid fitting. Hearing aid(s) ordered. Hearing aid evaluation completed. Patient was advised to check with their insurance to ascertain of they are eligible for any hearing aid benefits.     PLAN: Kristal is scheduled to return in 2-3 weeks for a hearing aid fitting and programming. Purchase agreement will be completed on that date. Please contact this clinic with any questions or concerns.      Dimas Bright CCC-A  Licensed  Audiologist #[id number]  7/24/2025

## 2025-08-18 LAB
ABO + RH BLD: NORMAL
BLD GP AB SCN SERPL QL: NEGATIVE
SPECIMEN EXP DATE BLD: NORMAL

## 2025-08-19 ENCOUNTER — APPOINTMENT (OUTPATIENT)
Dept: LAB | Facility: CLINIC | Age: 65
End: 2025-08-19
Payer: COMMERCIAL

## 2025-08-19 ENCOUNTER — OFFICE VISIT (OUTPATIENT)
Dept: SURGERY | Facility: CLINIC | Age: 65
End: 2025-08-19
Payer: COMMERCIAL

## 2025-08-19 ENCOUNTER — PRE VISIT (OUTPATIENT)
Dept: SURGERY | Facility: CLINIC | Age: 65
End: 2025-08-19

## 2025-08-19 ENCOUNTER — LAB (OUTPATIENT)
Dept: LAB | Facility: CLINIC | Age: 65
End: 2025-08-19
Payer: COMMERCIAL

## 2025-08-19 VITALS
DIASTOLIC BLOOD PRESSURE: 71 MMHG | HEART RATE: 61 BPM | OXYGEN SATURATION: 96 % | RESPIRATION RATE: 16 BRPM | TEMPERATURE: 98 F | SYSTOLIC BLOOD PRESSURE: 111 MMHG | WEIGHT: 173 LBS | HEIGHT: 65 IN | BODY MASS INDEX: 28.82 KG/M2

## 2025-08-19 DIAGNOSIS — M50.30 DDD (DEGENERATIVE DISC DISEASE), CERVICAL: ICD-10-CM

## 2025-08-19 DIAGNOSIS — Z01.818 PRE-OP EVALUATION: ICD-10-CM

## 2025-08-19 DIAGNOSIS — M54.12 CERVICAL RADICULAR PAIN: ICD-10-CM

## 2025-08-19 DIAGNOSIS — R20.0 NUMBNESS AND TINGLING IN BOTH HANDS: ICD-10-CM

## 2025-08-19 DIAGNOSIS — R20.2 NUMBNESS AND TINGLING IN BOTH HANDS: ICD-10-CM

## 2025-08-19 DIAGNOSIS — M40.292 OTHER KYPHOSIS OF CERVICAL REGION: ICD-10-CM

## 2025-08-19 DIAGNOSIS — M48.02 SPINAL STENOSIS IN CERVICAL REGION: ICD-10-CM

## 2025-08-19 DIAGNOSIS — Z01.818 PRE-OP EVALUATION: Primary | ICD-10-CM

## 2025-08-19 DIAGNOSIS — M48.02 SPINAL STENOSIS OF CERVICAL REGION: ICD-10-CM

## 2025-08-19 LAB
BLOOD BANK CHART COMMENT: NORMAL
CREAT SERPL-MCNC: 0.62 MG/DL (ref 0.51–0.95)
EGFRCR SERPLBLD CKD-EPI 2021: >90 ML/MIN/1.73M2
HGB BLD-MCNC: 13.4 G/DL (ref 11.7–15.7)
MCV RBC AUTO: 97.8 FL (ref 78–100)
MCV RBC AUTO: 97.8 FL (ref 78–100)
PLATELET # BLD AUTO: 307 10E3/UL (ref 150–450)
SPECIMEN EXP DATE BLD: NORMAL

## 2025-08-19 PROCEDURE — 85018 HEMOGLOBIN: CPT | Performed by: PATHOLOGY

## 2025-08-19 PROCEDURE — 85049 AUTOMATED PLATELET COUNT: CPT | Performed by: PATHOLOGY

## 2025-08-19 PROCEDURE — 36415 COLL VENOUS BLD VENIPUNCTURE: CPT | Performed by: PATHOLOGY

## 2025-08-19 PROCEDURE — 82565 ASSAY OF CREATININE: CPT | Performed by: PATHOLOGY

## 2025-08-19 PROCEDURE — 99215 OFFICE O/P EST HI 40 MIN: CPT | Performed by: PHYSICIAN ASSISTANT

## 2025-08-19 PROCEDURE — 86901 BLOOD TYPING SEROLOGIC RH(D): CPT

## 2025-08-19 RX ORDER — CALCIUM CARBONATE 500 MG/1
1 TABLET, CHEWABLE ORAL 2 TIMES DAILY PRN
COMMUNITY

## 2025-08-19 RX ORDER — SCOPOLAMINE 1 MG/3D
1 PATCH, EXTENDED RELEASE TRANSDERMAL ONCE
OUTPATIENT
Start: 2025-08-19 | End: 2025-08-19

## 2025-08-19 RX ORDER — LORAZEPAM 0.5 MG/1
0.5 TABLET ORAL 3 TIMES DAILY PRN
COMMUNITY

## 2025-08-19 ASSESSMENT — PAIN SCALES - GENERAL: PAINLEVEL_OUTOF10: NO PAIN (0)

## 2025-08-19 ASSESSMENT — ENCOUNTER SYMPTOMS: SEIZURES: 0

## 2025-08-19 ASSESSMENT — LIFESTYLE VARIABLES: TOBACCO_USE: 0

## 2025-08-30 ENCOUNTER — HEALTH MAINTENANCE LETTER (OUTPATIENT)
Age: 65
End: 2025-08-30

## 2025-09-03 ENCOUNTER — HOSPITAL ENCOUNTER (INPATIENT)
Facility: CLINIC | Age: 65
End: 2025-09-03
Attending: ORTHOPAEDIC SURGERY | Admitting: ORTHOPAEDIC SURGERY
Payer: COMMERCIAL

## 2025-09-03 DIAGNOSIS — M48.02 CERVICAL STENOSIS OF SPINE: ICD-10-CM

## 2025-09-03 DIAGNOSIS — M54.2 CERVICALGIA: Primary | ICD-10-CM

## 2025-09-03 LAB — GLUCOSE BLDC GLUCOMTR-MCNC: 102 MG/DL (ref 70–99)

## 2025-09-03 PROCEDURE — 250N000011 HC RX IP 250 OP 636: Performed by: ORTHOPAEDIC SURGERY

## 2025-09-03 PROCEDURE — 250N000013 HC RX MED GY IP 250 OP 250 PS 637: Performed by: STUDENT IN AN ORGANIZED HEALTH CARE EDUCATION/TRAINING PROGRAM

## 2025-09-03 PROCEDURE — 20930 SP BONE ALGRFT MORSEL ADD-ON: CPT | Mod: GC | Performed by: ORTHOPAEDIC SURGERY

## 2025-09-03 PROCEDURE — 272N000002 HC OR SUPPLY OTHER OPNP: Performed by: ORTHOPAEDIC SURGERY

## 2025-09-03 PROCEDURE — 22845 INSERT SPINE FIXATION DEVICE: CPT | Mod: GC | Performed by: ORTHOPAEDIC SURGERY

## 2025-09-03 PROCEDURE — 370N000017 HC ANESTHESIA TECHNICAL FEE, PER MIN: Performed by: ORTHOPAEDIC SURGERY

## 2025-09-03 PROCEDURE — 360N000085 HC SURGERY LEVEL 5 W/ FLUORO, PER MIN: Performed by: ORTHOPAEDIC SURGERY

## 2025-09-03 PROCEDURE — 250N000011 HC RX IP 250 OP 636: Performed by: ANESTHESIOLOGY

## 2025-09-03 PROCEDURE — 250N000011 HC RX IP 250 OP 636: Performed by: STUDENT IN AN ORGANIZED HEALTH CARE EDUCATION/TRAINING PROGRAM

## 2025-09-03 PROCEDURE — 22551 ARTHRD ANT NTRBDY CERVICAL: CPT | Mod: GC | Performed by: ORTHOPAEDIC SURGERY

## 2025-09-03 PROCEDURE — 710N000010 HC RECOVERY PHASE 1, LEVEL 2, PER MIN: Performed by: ORTHOPAEDIC SURGERY

## 2025-09-03 PROCEDURE — 250N000013 HC RX MED GY IP 250 OP 250 PS 637: Performed by: PHYSICIAN ASSISTANT

## 2025-09-03 PROCEDURE — 258N000003 HC RX IP 258 OP 636: Performed by: STUDENT IN AN ORGANIZED HEALTH CARE EDUCATION/TRAINING PROGRAM

## 2025-09-03 PROCEDURE — 272N000001 HC OR GENERAL SUPPLY STERILE: Performed by: ORTHOPAEDIC SURGERY

## 2025-09-03 PROCEDURE — 250N000009 HC RX 250: Performed by: PHYSICIAN ASSISTANT

## 2025-09-03 PROCEDURE — 120N000002 HC R&B MED SURG/OB UMMC

## 2025-09-03 PROCEDURE — 999N000141 HC STATISTIC PRE-PROCEDURE NURSING ASSESSMENT: Performed by: ORTHOPAEDIC SURGERY

## 2025-09-03 PROCEDURE — C1762 CONN TISS, HUMAN(INC FASCIA): HCPCS | Performed by: ORTHOPAEDIC SURGERY

## 2025-09-03 PROCEDURE — C1889 IMPLANT/INSERT DEVICE, NOC: HCPCS | Performed by: ORTHOPAEDIC SURGERY

## 2025-09-03 PROCEDURE — 250N000025 HC SEVOFLURANE, PER MIN: Performed by: ORTHOPAEDIC SURGERY

## 2025-09-03 PROCEDURE — 22853 INSJ BIOMECHANICAL DEVICE: CPT | Mod: GC | Performed by: ORTHOPAEDIC SURGERY

## 2025-09-03 PROCEDURE — 250N000013 HC RX MED GY IP 250 OP 250 PS 637: Performed by: ANESTHESIOLOGY

## 2025-09-03 PROCEDURE — 20936 SP BONE AGRFT LOCAL ADD-ON: CPT | Mod: GC | Performed by: ORTHOPAEDIC SURGERY

## 2025-09-03 DEVICE — IMPLANTABLE DEVICE: Type: IMPLANTABLE DEVICE | Site: SPINE CERVICAL | Status: FUNCTIONAL

## 2025-09-03 DEVICE — GRAFT BONE PASTE GRAFTON PLUS DBM 1ML T45001: Type: IMPLANTABLE DEVICE | Site: SPINE CERVICAL | Status: FUNCTIONAL

## 2025-09-03 RX ORDER — FLUOXETINE HYDROCHLORIDE 40 MG/1
40 CAPSULE ORAL EVERY MORNING
Status: DISPENSED | OUTPATIENT
Start: 2025-09-04

## 2025-09-03 RX ORDER — CEFAZOLIN SODIUM/WATER 2 G/20 ML
2 SYRINGE (ML) INTRAVENOUS
Status: COMPLETED | OUTPATIENT
Start: 2025-09-03 | End: 2025-09-03

## 2025-09-03 RX ORDER — LIDOCAINE 40 MG/G
CREAM TOPICAL
Status: ACTIVE | OUTPATIENT
Start: 2025-09-03

## 2025-09-03 RX ORDER — CEFAZOLIN SODIUM/WATER 2 G/20 ML
2 SYRINGE (ML) INTRAVENOUS SEE ADMIN INSTRUCTIONS
Status: DISCONTINUED | OUTPATIENT
Start: 2025-09-03 | End: 2025-09-03 | Stop reason: HOSPADM

## 2025-09-03 RX ORDER — FENTANYL CITRATE 50 UG/ML
50 INJECTION, SOLUTION INTRAMUSCULAR; INTRAVENOUS EVERY 5 MIN PRN
Status: DISCONTINUED | OUTPATIENT
Start: 2025-09-03 | End: 2025-09-03 | Stop reason: HOSPADM

## 2025-09-03 RX ORDER — PROPRANOLOL HYDROCHLORIDE 80 MG/1
80 CAPSULE, EXTENDED RELEASE ORAL EVERY EVENING
Status: DISPENSED | OUTPATIENT
Start: 2025-09-03

## 2025-09-03 RX ORDER — PROCHLORPERAZINE MALEATE 5 MG/1
5 TABLET ORAL EVERY 6 HOURS PRN
Status: ACTIVE | OUTPATIENT
Start: 2025-09-03

## 2025-09-03 RX ORDER — OXYCODONE HYDROCHLORIDE 10 MG/1
10 TABLET ORAL EVERY 4 HOURS PRN
Status: DISPENSED | OUTPATIENT
Start: 2025-09-03

## 2025-09-03 RX ORDER — AMOXICILLIN 250 MG
1 CAPSULE ORAL 2 TIMES DAILY
Status: DISPENSED | OUTPATIENT
Start: 2025-09-03

## 2025-09-03 RX ORDER — OXYCODONE HYDROCHLORIDE 5 MG/1
5 TABLET ORAL
Status: COMPLETED | OUTPATIENT
Start: 2025-09-03 | End: 2025-09-03

## 2025-09-03 RX ORDER — SODIUM CHLORIDE, SODIUM LACTATE, POTASSIUM CHLORIDE, CALCIUM CHLORIDE 600; 310; 30; 20 MG/100ML; MG/100ML; MG/100ML; MG/100ML
INJECTION, SOLUTION INTRAVENOUS CONTINUOUS
Status: DISCONTINUED | OUTPATIENT
Start: 2025-09-03 | End: 2025-09-03 | Stop reason: HOSPADM

## 2025-09-03 RX ORDER — ROSUVASTATIN CALCIUM 10 MG/1
10 TABLET, COATED ORAL AT BEDTIME
Status: DISPENSED | OUTPATIENT
Start: 2025-09-03

## 2025-09-03 RX ORDER — ONDANSETRON 4 MG/1
4 TABLET, ORALLY DISINTEGRATING ORAL EVERY 6 HOURS PRN
Status: ACTIVE | OUTPATIENT
Start: 2025-09-03

## 2025-09-03 RX ORDER — PROGESTERONE 100 MG/1
200 CAPSULE ORAL EVERY MORNING
Status: DISPENSED | OUTPATIENT
Start: 2025-09-04

## 2025-09-03 RX ORDER — NALOXONE HYDROCHLORIDE 0.4 MG/ML
0.4 INJECTION, SOLUTION INTRAMUSCULAR; INTRAVENOUS; SUBCUTANEOUS
Status: ACTIVE | OUTPATIENT
Start: 2025-09-03

## 2025-09-03 RX ORDER — DEXAMETHASONE SODIUM PHOSPHATE 4 MG/ML
10 INJECTION, SOLUTION INTRA-ARTICULAR; INTRALESIONAL; INTRAMUSCULAR; INTRAVENOUS; SOFT TISSUE ONCE
Status: COMPLETED | OUTPATIENT
Start: 2025-09-03 | End: 2025-09-03

## 2025-09-03 RX ORDER — VANCOMYCIN HYDROCHLORIDE 1 G/20ML
INJECTION, POWDER, LYOPHILIZED, FOR SOLUTION INTRAVENOUS PRN
Status: DISCONTINUED | OUTPATIENT
Start: 2025-09-03 | End: 2025-09-03 | Stop reason: HOSPADM

## 2025-09-03 RX ORDER — TOBRAMYCIN 1.2 G/30ML
INJECTION, POWDER, LYOPHILIZED, FOR SOLUTION INTRAVENOUS PRN
Status: DISCONTINUED | OUTPATIENT
Start: 2025-09-03 | End: 2025-09-03 | Stop reason: HOSPADM

## 2025-09-03 RX ORDER — OXYCODONE HYDROCHLORIDE 5 MG/1
5 TABLET ORAL EVERY 4 HOURS PRN
Status: ACTIVE | OUTPATIENT
Start: 2025-09-03

## 2025-09-03 RX ORDER — HYDROMORPHONE HYDROCHLORIDE 1 MG/ML
0.4 INJECTION, SOLUTION INTRAMUSCULAR; INTRAVENOUS; SUBCUTANEOUS EVERY 5 MIN PRN
Status: DISCONTINUED | OUTPATIENT
Start: 2025-09-03 | End: 2025-09-03 | Stop reason: HOSPADM

## 2025-09-03 RX ORDER — CEFAZOLIN SODIUM 2 G/50ML
2 SOLUTION INTRAVENOUS EVERY 8 HOURS
Status: DISPENSED | OUTPATIENT
Start: 2025-09-03

## 2025-09-03 RX ORDER — CALCIUM CARBONATE 500 MG/1
500 TABLET, CHEWABLE ORAL 2 TIMES DAILY PRN
Status: ACTIVE | OUTPATIENT
Start: 2025-09-03

## 2025-09-03 RX ORDER — ONDANSETRON 2 MG/ML
4 INJECTION INTRAMUSCULAR; INTRAVENOUS EVERY 30 MIN PRN
Status: DISCONTINUED | OUTPATIENT
Start: 2025-09-03 | End: 2025-09-03 | Stop reason: HOSPADM

## 2025-09-03 RX ORDER — ACETAMINOPHEN 325 MG/1
975 TABLET ORAL EVERY 8 HOURS
Status: DISPENSED | OUTPATIENT
Start: 2025-09-03

## 2025-09-03 RX ORDER — ACETAMINOPHEN 325 MG/1
975 TABLET ORAL ONCE
Status: COMPLETED | OUTPATIENT
Start: 2025-09-03 | End: 2025-09-03

## 2025-09-03 RX ORDER — SCOPOLAMINE 1 MG/3D
1 PATCH, EXTENDED RELEASE TRANSDERMAL ONCE
Status: COMPLETED | OUTPATIENT
Start: 2025-09-03 | End: 2025-09-04

## 2025-09-03 RX ORDER — LABETALOL HYDROCHLORIDE 5 MG/ML
10 INJECTION, SOLUTION INTRAVENOUS
Status: DISCONTINUED | OUTPATIENT
Start: 2025-09-03 | End: 2025-09-03 | Stop reason: HOSPADM

## 2025-09-03 RX ORDER — APREPITANT 40 MG/1
40 CAPSULE ORAL ONCE
Status: COMPLETED | OUTPATIENT
Start: 2025-09-03 | End: 2025-09-03

## 2025-09-03 RX ORDER — HYDROMORPHONE HYDROCHLORIDE 1 MG/ML
0.4 INJECTION, SOLUTION INTRAMUSCULAR; INTRAVENOUS; SUBCUTANEOUS
Status: ACTIVE | OUTPATIENT
Start: 2025-09-03

## 2025-09-03 RX ORDER — POLYETHYLENE GLYCOL 3350 17 G/17G
17 POWDER, FOR SOLUTION ORAL DAILY
Status: DISPENSED | OUTPATIENT
Start: 2025-09-04

## 2025-09-03 RX ORDER — DEXAMETHASONE SODIUM PHOSPHATE 4 MG/ML
0.2 INJECTION, SOLUTION INTRA-ARTICULAR; INTRALESIONAL; INTRAMUSCULAR; INTRAVENOUS; SOFT TISSUE ONCE
Status: COMPLETED | OUTPATIENT
Start: 2025-09-03 | End: 2025-09-03

## 2025-09-03 RX ORDER — FAMOTIDINE 20 MG/1
20 TABLET, FILM COATED ORAL 2 TIMES DAILY
Status: DISPENSED | OUTPATIENT
Start: 2025-09-03 | End: 2025-09-05

## 2025-09-03 RX ORDER — ONDANSETRON 4 MG/1
4 TABLET, ORALLY DISINTEGRATING ORAL EVERY 30 MIN PRN
Status: DISCONTINUED | OUTPATIENT
Start: 2025-09-03 | End: 2025-09-03 | Stop reason: HOSPADM

## 2025-09-03 RX ORDER — NALOXONE HYDROCHLORIDE 0.4 MG/ML
0.2 INJECTION, SOLUTION INTRAMUSCULAR; INTRAVENOUS; SUBCUTANEOUS
Status: ACTIVE | OUTPATIENT
Start: 2025-09-03

## 2025-09-03 RX ORDER — DEXAMETHASONE SODIUM PHOSPHATE 4 MG/ML
10 INJECTION, SOLUTION INTRA-ARTICULAR; INTRALESIONAL; INTRAMUSCULAR; INTRAVENOUS; SOFT TISSUE EVERY 8 HOURS
Status: COMPLETED | OUTPATIENT
Start: 2025-09-03 | End: 2025-09-04

## 2025-09-03 RX ORDER — OXYCODONE HYDROCHLORIDE 10 MG/1
10 TABLET ORAL
Status: DISCONTINUED | OUTPATIENT
Start: 2025-09-03 | End: 2025-09-03 | Stop reason: HOSPADM

## 2025-09-03 RX ORDER — ONDANSETRON 2 MG/ML
4 INJECTION INTRAMUSCULAR; INTRAVENOUS EVERY 6 HOURS PRN
Status: ACTIVE | OUTPATIENT
Start: 2025-09-03

## 2025-09-03 RX ORDER — BISACODYL 10 MG
10 SUPPOSITORY, RECTAL RECTAL DAILY PRN
Status: ACTIVE | OUTPATIENT
Start: 2025-09-06

## 2025-09-03 RX ORDER — SODIUM CHLORIDE 9 MG/ML
INJECTION, SOLUTION INTRAVENOUS CONTINUOUS
Status: DISPENSED | OUTPATIENT
Start: 2025-09-03

## 2025-09-03 RX ORDER — HYDROMORPHONE HYDROCHLORIDE 1 MG/ML
0.2 INJECTION, SOLUTION INTRAMUSCULAR; INTRAVENOUS; SUBCUTANEOUS
Status: ACTIVE | OUTPATIENT
Start: 2025-09-03

## 2025-09-03 RX ORDER — DEXAMETHASONE SODIUM PHOSPHATE 4 MG/ML
4 INJECTION, SOLUTION INTRA-ARTICULAR; INTRALESIONAL; INTRAMUSCULAR; INTRAVENOUS; SOFT TISSUE
Status: DISCONTINUED | OUTPATIENT
Start: 2025-09-03 | End: 2025-09-03 | Stop reason: HOSPADM

## 2025-09-03 RX ORDER — HYDROMORPHONE HYDROCHLORIDE 1 MG/ML
0.2 INJECTION, SOLUTION INTRAMUSCULAR; INTRAVENOUS; SUBCUTANEOUS EVERY 5 MIN PRN
Status: DISCONTINUED | OUTPATIENT
Start: 2025-09-03 | End: 2025-09-03 | Stop reason: HOSPADM

## 2025-09-03 RX ORDER — NALOXONE HYDROCHLORIDE 0.4 MG/ML
0.1 INJECTION, SOLUTION INTRAMUSCULAR; INTRAVENOUS; SUBCUTANEOUS
Status: DISCONTINUED | OUTPATIENT
Start: 2025-09-03 | End: 2025-09-03 | Stop reason: HOSPADM

## 2025-09-03 RX ORDER — FENTANYL CITRATE 50 UG/ML
25 INJECTION, SOLUTION INTRAMUSCULAR; INTRAVENOUS EVERY 5 MIN PRN
Status: DISCONTINUED | OUTPATIENT
Start: 2025-09-03 | End: 2025-09-03 | Stop reason: HOSPADM

## 2025-09-03 RX ORDER — MAGNESIUM SULFATE HEPTAHYDRATE 40 MG/ML
2 INJECTION, SOLUTION INTRAVENOUS ONCE
Status: COMPLETED | OUTPATIENT
Start: 2025-09-03 | End: 2025-09-03

## 2025-09-03 RX ORDER — GABAPENTIN 100 MG/1
100 CAPSULE ORAL
Status: COMPLETED | OUTPATIENT
Start: 2025-09-03 | End: 2025-09-03

## 2025-09-03 RX ORDER — LIDOCAINE 40 MG/G
CREAM TOPICAL
Status: DISCONTINUED | OUTPATIENT
Start: 2025-09-03 | End: 2025-09-03 | Stop reason: HOSPADM

## 2025-09-03 RX ADMIN — DEXAMETHASONE SODIUM PHOSPHATE 10 MG: 4 INJECTION, SOLUTION INTRAMUSCULAR; INTRAVENOUS at 18:34

## 2025-09-03 RX ADMIN — SENNOSIDES, DOCUSATE SODIUM 1 TABLET: 50; 8.6 TABLET, FILM COATED ORAL at 21:13

## 2025-09-03 RX ADMIN — DEXAMETHASONE SODIUM PHOSPHATE 10 MG: 4 INJECTION, SOLUTION INTRAMUSCULAR; INTRAVENOUS at 11:32

## 2025-09-03 RX ADMIN — FENTANYL CITRATE 50 MCG: 50 INJECTION, SOLUTION INTRAMUSCULAR; INTRAVENOUS at 15:30

## 2025-09-03 RX ADMIN — CEFAZOLIN SODIUM 2 G: 2 SOLUTION INTRAVENOUS at 21:14

## 2025-09-03 RX ADMIN — FENTANYL CITRATE 50 MCG: 50 INJECTION, SOLUTION INTRAMUSCULAR; INTRAVENOUS at 15:13

## 2025-09-03 RX ADMIN — OXYCODONE HYDROCHLORIDE 10 MG: 10 TABLET ORAL at 21:18

## 2025-09-03 RX ADMIN — SCOPOLAMINE 1 PATCH: 1.5 PATCH, EXTENDED RELEASE TRANSDERMAL at 09:43

## 2025-09-03 RX ADMIN — ROSUVASTATIN CALCIUM 10 MG: 10 TABLET, FILM COATED ORAL at 21:13

## 2025-09-03 RX ADMIN — ACETAMINOPHEN 975 MG: 325 TABLET ORAL at 09:51

## 2025-09-03 RX ADMIN — FAMOTIDINE 20 MG: 20 TABLET, FILM COATED ORAL at 21:13

## 2025-09-03 RX ADMIN — SODIUM CHLORIDE: 0.9 INJECTION, SOLUTION INTRAVENOUS at 18:34

## 2025-09-03 RX ADMIN — APREPITANT 40 MG: 40 CAPSULE ORAL at 09:52

## 2025-09-03 RX ADMIN — GABAPENTIN 100 MG: 100 CAPSULE ORAL at 09:51

## 2025-09-03 RX ADMIN — OXYCODONE HYDROCHLORIDE 5 MG: 5 TABLET ORAL at 16:25

## 2025-09-03 RX ADMIN — ACETAMINOPHEN 975 MG: 325 TABLET ORAL at 18:34

## 2025-09-03 ASSESSMENT — ACTIVITIES OF DAILY LIVING (ADL)
ADLS_ACUITY_SCORE: 45
ADLS_ACUITY_SCORE: 43
ADLS_ACUITY_SCORE: 43
ADLS_ACUITY_SCORE: 44
ADLS_ACUITY_SCORE: 45
ADLS_ACUITY_SCORE: 44
ADLS_ACUITY_SCORE: 45
ADLS_ACUITY_SCORE: 44
ADLS_ACUITY_SCORE: 41
ADLS_ACUITY_SCORE: 43
ADLS_ACUITY_SCORE: 45
ADLS_ACUITY_SCORE: 44
ADLS_ACUITY_SCORE: 43
ADLS_ACUITY_SCORE: 45
ADLS_ACUITY_SCORE: 45

## 2025-09-04 VITALS
HEIGHT: 64 IN | WEIGHT: 175.71 LBS | TEMPERATURE: 97.7 F | RESPIRATION RATE: 16 BRPM | OXYGEN SATURATION: 98 % | BODY MASS INDEX: 30 KG/M2 | HEART RATE: 65 BPM | SYSTOLIC BLOOD PRESSURE: 120 MMHG | DIASTOLIC BLOOD PRESSURE: 63 MMHG

## 2025-09-04 LAB
CREAT SERPL-MCNC: 0.61 MG/DL (ref 0.51–0.95)
EGFRCR SERPLBLD CKD-EPI 2021: >90 ML/MIN/1.73M2
GLUCOSE BLDC GLUCOMTR-MCNC: 173 MG/DL (ref 70–99)
GLUCOSE BLDC GLUCOMTR-MCNC: 211 MG/DL (ref 70–99)
HGB BLD-MCNC: 12.7 G/DL (ref 11.7–15.7)
MCV RBC AUTO: 100.3 FL (ref 78–100)

## 2025-09-04 PROCEDURE — 85018 HEMOGLOBIN: CPT | Performed by: STUDENT IN AN ORGANIZED HEALTH CARE EDUCATION/TRAINING PROGRAM

## 2025-09-04 PROCEDURE — 97530 THERAPEUTIC ACTIVITIES: CPT | Mod: GO

## 2025-09-04 PROCEDURE — 250N000011 HC RX IP 250 OP 636: Performed by: STUDENT IN AN ORGANIZED HEALTH CARE EDUCATION/TRAINING PROGRAM

## 2025-09-04 PROCEDURE — 999N000147 HC STATISTIC PT IP EVAL DEFER: Performed by: PHYSICAL THERAPIST

## 2025-09-04 PROCEDURE — 36415 COLL VENOUS BLD VENIPUNCTURE: CPT | Performed by: ORTHOPAEDIC SURGERY

## 2025-09-04 PROCEDURE — 250N000013 HC RX MED GY IP 250 OP 250 PS 637: Performed by: PHYSICIAN ASSISTANT

## 2025-09-04 PROCEDURE — 82565 ASSAY OF CREATININE: CPT | Performed by: ORTHOPAEDIC SURGERY

## 2025-09-04 PROCEDURE — 97166 OT EVAL MOD COMPLEX 45 MIN: CPT | Mod: GO

## 2025-09-04 PROCEDURE — 250N000013 HC RX MED GY IP 250 OP 250 PS 637: Performed by: STUDENT IN AN ORGANIZED HEALTH CARE EDUCATION/TRAINING PROGRAM

## 2025-09-04 PROCEDURE — 97535 SELF CARE MNGMENT TRAINING: CPT | Mod: GO

## 2025-09-04 PROCEDURE — 120N000002 HC R&B MED SURG/OB UMMC

## 2025-09-04 RX ORDER — ACETAMINOPHEN 325 MG/1
975 TABLET ORAL EVERY 8 HOURS
COMMUNITY
Start: 2025-09-04

## 2025-09-04 RX ORDER — METHOCARBAMOL 500 MG/1
500 TABLET, FILM COATED ORAL EVERY 6 HOURS PRN
Status: DISPENSED | OUTPATIENT
Start: 2025-09-04

## 2025-09-04 RX ORDER — AMOXICILLIN 250 MG
1 CAPSULE ORAL 2 TIMES DAILY
COMMUNITY
Start: 2025-09-04

## 2025-09-04 RX ORDER — POLYETHYLENE GLYCOL 3350 17 G/17G
17 POWDER, FOR SOLUTION ORAL DAILY
COMMUNITY
Start: 2025-09-04

## 2025-09-04 RX ORDER — OXYCODONE HYDROCHLORIDE 5 MG/1
5 TABLET ORAL EVERY 4 HOURS PRN
Qty: 35 TABLET | Refills: 0 | Status: SHIPPED | OUTPATIENT
Start: 2025-09-04

## 2025-09-04 RX ADMIN — PROPRANOLOL HYDROCHLORIDE 80 MG: 80 CAPSULE, EXTENDED RELEASE ORAL at 21:03

## 2025-09-04 RX ADMIN — POLYETHYLENE GLYCOL 3350 17 G: 17 POWDER, FOR SOLUTION ORAL at 08:40

## 2025-09-04 RX ADMIN — DEXAMETHASONE SODIUM PHOSPHATE 10 MG: 4 INJECTION, SOLUTION INTRAMUSCULAR; INTRAVENOUS at 01:29

## 2025-09-04 RX ADMIN — CEFAZOLIN SODIUM 2 G: 2 SOLUTION INTRAVENOUS at 21:03

## 2025-09-04 RX ADMIN — FAMOTIDINE 20 MG: 20 TABLET, FILM COATED ORAL at 08:41

## 2025-09-04 RX ADMIN — METHOCARBAMOL 500 MG: 500 TABLET ORAL at 18:13

## 2025-09-04 RX ADMIN — FAMOTIDINE 20 MG: 20 TABLET, FILM COATED ORAL at 21:03

## 2025-09-04 RX ADMIN — OXYCODONE HYDROCHLORIDE 10 MG: 10 TABLET ORAL at 08:41

## 2025-09-04 RX ADMIN — SENNOSIDES, DOCUSATE SODIUM 1 TABLET: 50; 8.6 TABLET, FILM COATED ORAL at 08:41

## 2025-09-04 RX ADMIN — OXYCODONE HYDROCHLORIDE 10 MG: 10 TABLET ORAL at 01:30

## 2025-09-04 RX ADMIN — ACETAMINOPHEN 975 MG: 325 TABLET ORAL at 12:40

## 2025-09-04 RX ADMIN — OXYCODONE HYDROCHLORIDE 10 MG: 10 TABLET ORAL at 12:40

## 2025-09-04 RX ADMIN — CEFAZOLIN SODIUM 2 G: 2 SOLUTION INTRAVENOUS at 12:41

## 2025-09-04 RX ADMIN — PROGESTERONE 200 MG: 100 CAPSULE ORAL at 08:41

## 2025-09-04 RX ADMIN — CEFAZOLIN SODIUM 2 G: 2 SOLUTION INTRAVENOUS at 03:58

## 2025-09-04 RX ADMIN — DEXAMETHASONE SODIUM PHOSPHATE 10 MG: 4 INJECTION, SOLUTION INTRAMUSCULAR; INTRAVENOUS at 08:41

## 2025-09-04 RX ADMIN — ROSUVASTATIN CALCIUM 10 MG: 10 TABLET, FILM COATED ORAL at 22:01

## 2025-09-04 RX ADMIN — SENNOSIDES, DOCUSATE SODIUM 1 TABLET: 50; 8.6 TABLET, FILM COATED ORAL at 21:03

## 2025-09-04 RX ADMIN — ACETAMINOPHEN 975 MG: 325 TABLET ORAL at 01:30

## 2025-09-04 RX ADMIN — ACETAMINOPHEN 975 MG: 325 TABLET ORAL at 17:45

## 2025-09-04 RX ADMIN — FLUOXETINE HYDROCHLORIDE 40 MG: 40 CAPSULE ORAL at 08:40

## 2025-09-04 ASSESSMENT — ACTIVITIES OF DAILY LIVING (ADL)
ADLS_ACUITY_SCORE: 50
ADLS_ACUITY_SCORE: 43
ADLS_ACUITY_SCORE: 50
ADLS_ACUITY_SCORE: 50
ADLS_ACUITY_SCORE: 43
ADLS_ACUITY_SCORE: 50
ADLS_ACUITY_SCORE: 43
ADLS_ACUITY_SCORE: 50
ADLS_ACUITY_SCORE: 43
ADLS_ACUITY_SCORE: 50

## (undated) DEVICE — SU VICRYL 0 CT-1 CR 8X18" J740D

## (undated) DEVICE — DRSG TEGADERM 4X4 3/4" 1626W

## (undated) DEVICE — DRAPE O ARM TUBE 9732722

## (undated) DEVICE — MARKER SPHERES PASSIVE MEDT PACK 1 8801071

## (undated) DEVICE — GOWN IMPERVIOUS SPECIALTY XLG/XLONG 32474

## (undated) DEVICE — DRAPE POUCH INSTRUMENT 1018

## (undated) DEVICE — Device

## (undated) DEVICE — PREP CHLORAPREP 26ML TINTED ORANGE  260815

## (undated) DEVICE — SOLUTION WATER 1000ML BOTTLE R5000-01

## (undated) DEVICE — SU MONOCRYL 2-0 SH 27" UND Y417H

## (undated) DEVICE — BLADE CLIPPER SGL USE 9680

## (undated) DEVICE — SUCTION MANIFOLD DORNOCH ULTRA CART UL-CL500

## (undated) DEVICE — SUCTION TIP YANKAUER STR K87

## (undated) DEVICE — PACK UNIVERSAL SPLIT 29131

## (undated) DEVICE — DRSG GAUZE 4X8" NON21842

## (undated) DEVICE — DECANTER TRANSFER DEVICE 2008S

## (undated) DEVICE — GLOVE PROTEXIS W/NEU-THERA 7.5  2D73TE75

## (undated) DEVICE — BASIN SET MAJOR

## (undated) DEVICE — MITT SURGICAL PREP HAIR REMOVER LATEX FREE 617142

## (undated) DEVICE — ESU ELEC BLADE 6" COATED/INSULATED E1455-6

## (undated) DEVICE — POSITIONER ARMBOARD FOAM CONVOLUTE CP-501

## (undated) DEVICE — SOL WATER IRRIG 1000ML BOTTLE 2F7114

## (undated) DEVICE — ADH SKIN CLOSURE PREMIERPRO EXOFIN 1.0ML 3470

## (undated) DEVICE — LINEN BACK PACK 5440

## (undated) DEVICE — POSITIONER ARMBOARD FOAM 1PAIR LF FP-ARMB1

## (undated) DEVICE — IOM SUPPLIES/CASE FEE

## (undated) DEVICE — SU VICRYL 2-0 CT-2 8X18" UND D8144

## (undated) DEVICE — WIPES FOLEY CARE SURESTEP PROVON DFC100

## (undated) DEVICE — SYR 50ML LL W/O NDL 309653

## (undated) DEVICE — STPL SKIN 35W ROTATING HEAD PRW35

## (undated) DEVICE — DRAPE MICROSCOPE MICRO-KOVER LEICA 48"X120" 09-MK651

## (undated) DEVICE — SU VICRYL 1 CT-1 CR 8X18" J741D

## (undated) DEVICE — DRAPE C-ARM W/STRAPS 42X72" 07-CA104

## (undated) DEVICE — GLOVE PROTEXIS BLUE W/NEU-THERA 8.0  2D73EB80

## (undated) DEVICE — GLOVE PROTEXIS BLUE W/NEU-THERA 6.5  2D73EB65

## (undated) DEVICE — LINEN TOWEL PACK X5 5464

## (undated) DEVICE — DRSG KERLIX FLUFFS X5

## (undated) DEVICE — MARKER SPHERES PASSIVE MEDT PACK 5 8801075

## (undated) DEVICE — SOLUTION IRRIGATION 0.9% NACL 1000ML BOTTLE R5200-01

## (undated) DEVICE — DRILL BIT QUICK COUPLING CAN 5.0X300MM 310.63

## (undated) DEVICE — SUTURE VICRYL+ 2-0 CT-2 CR 8X18" VCP726D

## (undated) DEVICE — ESU ELEC BLADE 6" COATED E1450-6

## (undated) DEVICE — DRAPE STERI TOWEL LG 1010

## (undated) DEVICE — DRAPE C-ARMOR 5 SIDED 5523

## (undated) DEVICE — WIRE GUIDE SYN 2.8X450MM THRD 900.726

## (undated) DEVICE — SOL ISOPROPYL RUBBING ALCOHOL USP 70% 4OZ HDX-20 I0020

## (undated) DEVICE — SU MONOCRYL 3-0 PS-2 18" UND Y497G

## (undated) DEVICE — ESU ELEC BLADE 2.75" COATED/INSULATED E1455

## (undated) DEVICE — DRAPE IOBAN INCISE 23X17" 6650EZ

## (undated) DEVICE — TOOL DISSECT MIDAS MR8 14CM MATCH HEAD 3MM MR8-14MH30

## (undated) DEVICE — BRUSH SURGICAL SCRUB W/4% CHLORHEXIDINE GLUCONATE SOL 4458A

## (undated) DEVICE — GLOVE PROTEXIS W/NEU-THERA 6.5  2D73TE65

## (undated) DEVICE — SUCTION MANIFOLD NEPTUNE 2 SYS 4 PORT 0702-020-000

## (undated) DEVICE — ESU GROUND PAD UNIVERSAL W/O CORD

## (undated) DEVICE — LIGHT SOURCE SPINE QUAD  9560658

## (undated) DEVICE — RX SURGIFLO HEMOSTATIC MATRIX W/THROMBIN 8ML 2994

## (undated) DEVICE — PREP CHLORAPREP 26ML TINTED HI-LITE ORANGE 930815

## (undated) DEVICE — PIN PERCUTANEOUS NAVIGATION FOR SPINE O-ARM150MM 9733236

## (undated) DEVICE — SPONGE SURGIFOAM 100 1974

## (undated) DEVICE — RESTRAINT LIMB HOLDER ANKLE/WRIST FOAM W/QUICK RELEASE 2533

## (undated) DEVICE — DRAPE POUCH IRR 1016

## (undated) DEVICE — SOL NACL 0.9% IRRIG 1000ML BOTTLE 2F7124

## (undated) DEVICE — SUTURE MONOCRYL 3-0 18 PS2 UND MCP497G

## (undated) DEVICE — POSITIONER HEAD DONUT FOAM 9" HR 104

## (undated) DEVICE — SPONGE COTTONOID 1X1" 80-1403

## (undated) DEVICE — TAPE DURAPORE 3" SILK 1538-3

## (undated) DEVICE — DRAIN JACKSON PRATT 10FR ROUND SU130-1321

## (undated) DEVICE — DRAIN JACKSON PRATT CHANNEL 10FR RND SIL W/TROCAR JP-2227

## (undated) RX ORDER — CEFAZOLIN SODIUM 2 G/100ML
INJECTION, SOLUTION INTRAVENOUS
Status: DISPENSED
Start: 2019-08-22

## (undated) RX ORDER — KETOROLAC TROMETHAMINE 30 MG/ML
INJECTION, SOLUTION INTRAMUSCULAR; INTRAVENOUS
Status: DISPENSED
Start: 2019-08-22

## (undated) RX ORDER — HYDROMORPHONE HYDROCHLORIDE 1 MG/ML
INJECTION, SOLUTION INTRAMUSCULAR; INTRAVENOUS; SUBCUTANEOUS
Status: DISPENSED
Start: 2021-08-11

## (undated) RX ORDER — OXYCODONE HYDROCHLORIDE 5 MG/1
TABLET ORAL
Status: DISPENSED
Start: 2021-08-11

## (undated) RX ORDER — ACETAMINOPHEN 325 MG/1
TABLET ORAL
Status: DISPENSED
Start: 2021-08-11

## (undated) RX ORDER — ONDANSETRON 2 MG/ML
INJECTION INTRAMUSCULAR; INTRAVENOUS
Status: DISPENSED
Start: 2019-08-22

## (undated) RX ORDER — FENTANYL CITRATE-0.9 % NACL/PF 10 MCG/ML
PLASTIC BAG, INJECTION (ML) INTRAVENOUS
Status: DISPENSED
Start: 2025-09-03

## (undated) RX ORDER — FENTANYL CITRATE 50 UG/ML
INJECTION, SOLUTION INTRAMUSCULAR; INTRAVENOUS
Status: DISPENSED
Start: 2021-08-11

## (undated) RX ORDER — GABAPENTIN 100 MG/1
CAPSULE ORAL
Status: DISPENSED
Start: 2025-09-03

## (undated) RX ORDER — BUPIVACAINE HYDROCHLORIDE AND EPINEPHRINE 2.5; 5 MG/ML; UG/ML
INJECTION, SOLUTION INFILTRATION; PERINEURAL
Status: DISPENSED
Start: 2021-08-11

## (undated) RX ORDER — GLYCOPYRROLATE 0.2 MG/ML
INJECTION, SOLUTION INTRAMUSCULAR; INTRAVENOUS
Status: DISPENSED
Start: 2019-08-22

## (undated) RX ORDER — BUPIVACAINE HYDROCHLORIDE AND EPINEPHRINE 2.5; 5 MG/ML; UG/ML
INJECTION, SOLUTION EPIDURAL; INFILTRATION; INTRACAUDAL; PERINEURAL
Status: DISPENSED
Start: 2021-08-11

## (undated) RX ORDER — OXYCODONE HYDROCHLORIDE 5 MG/1
TABLET ORAL
Status: DISPENSED
Start: 2025-09-03

## (undated) RX ORDER — BETAMETHASONE SODIUM PHOSPHATE AND BETAMETHASONE ACETATE 3; 3 MG/ML; MG/ML
INJECTION, SUSPENSION INTRA-ARTICULAR; INTRALESIONAL; INTRAMUSCULAR; SOFT TISSUE
Status: DISPENSED
Start: 2019-04-10

## (undated) RX ORDER — DEXAMETHASONE SODIUM PHOSPHATE 10 MG/ML
INJECTION, SOLUTION INTRAMUSCULAR; INTRAVENOUS
Status: DISPENSED
Start: 2025-09-03

## (undated) RX ORDER — DEXAMETHASONE SODIUM PHOSPHATE 4 MG/ML
INJECTION, SOLUTION INTRA-ARTICULAR; INTRALESIONAL; INTRAMUSCULAR; INTRAVENOUS; SOFT TISSUE
Status: DISPENSED
Start: 2019-08-22

## (undated) RX ORDER — VANCOMYCIN HYDROCHLORIDE 1 G/20ML
INJECTION, POWDER, LYOPHILIZED, FOR SOLUTION INTRAVENOUS
Status: DISPENSED
Start: 2025-09-03

## (undated) RX ORDER — GABAPENTIN 300 MG/1
CAPSULE ORAL
Status: DISPENSED
Start: 2019-08-22

## (undated) RX ORDER — TOBRAMYCIN 1.2 G/30ML
INJECTION, POWDER, LYOPHILIZED, FOR SOLUTION INTRAVENOUS
Status: DISPENSED
Start: 2025-09-03

## (undated) RX ORDER — FENTANYL CITRATE 50 UG/ML
INJECTION, SOLUTION INTRAMUSCULAR; INTRAVENOUS
Status: DISPENSED
Start: 2019-08-22

## (undated) RX ORDER — TRIAMCINOLONE ACETONIDE 40 MG/ML
INJECTION, SUSPENSION INTRA-ARTICULAR; INTRAMUSCULAR
Status: DISPENSED
Start: 2019-03-18

## (undated) RX ORDER — PROPOFOL 10 MG/ML
INJECTION, EMULSION INTRAVENOUS
Status: DISPENSED
Start: 2019-08-22

## (undated) RX ORDER — LIDOCAINE HYDROCHLORIDE 10 MG/ML
INJECTION, SOLUTION EPIDURAL; INFILTRATION; INTRACAUDAL; PERINEURAL
Status: DISPENSED
Start: 2019-03-18

## (undated) RX ORDER — SCOPOLAMINE 1 MG/3D
PATCH, EXTENDED RELEASE TRANSDERMAL
Status: DISPENSED
Start: 2025-09-03

## (undated) RX ORDER — FENTANYL CITRATE 50 UG/ML
INJECTION, SOLUTION INTRAMUSCULAR; INTRAVENOUS
Status: DISPENSED
Start: 2025-09-03

## (undated) RX ORDER — LIDOCAINE HYDROCHLORIDE 20 MG/ML
INJECTION, SOLUTION EPIDURAL; INFILTRATION; INTRACAUDAL; PERINEURAL
Status: DISPENSED
Start: 2019-08-22

## (undated) RX ORDER — BUPIVACAINE HYDROCHLORIDE AND EPINEPHRINE 2.5; 5 MG/ML; UG/ML
INJECTION, SOLUTION EPIDURAL; INFILTRATION; INTRACAUDAL; PERINEURAL
Status: DISPENSED
Start: 2019-08-22

## (undated) RX ORDER — APREPITANT 40 MG/1
CAPSULE ORAL
Status: DISPENSED
Start: 2025-09-03

## (undated) RX ORDER — GABAPENTIN 300 MG/1
CAPSULE ORAL
Status: DISPENSED
Start: 2021-08-11

## (undated) RX ORDER — PROPOFOL 10 MG/ML
INJECTION, EMULSION INTRAVENOUS
Status: DISPENSED
Start: 2025-09-03

## (undated) RX ORDER — CEFAZOLIN SODIUM 2 G/100ML
INJECTION, SOLUTION INTRAVENOUS
Status: DISPENSED
Start: 2021-08-11

## (undated) RX ORDER — PHENYLEPHRINE HCL IN 0.9% NACL 1 MG/10 ML
SYRINGE (ML) INTRAVENOUS
Status: DISPENSED
Start: 2019-08-22

## (undated) RX ORDER — ACETAMINOPHEN 325 MG/1
TABLET ORAL
Status: DISPENSED
Start: 2025-09-03

## (undated) RX ORDER — VANCOMYCIN HYDROCHLORIDE 1 G/20ML
INJECTION, POWDER, LYOPHILIZED, FOR SOLUTION INTRAVENOUS
Status: DISPENSED
Start: 2019-08-22

## (undated) RX ORDER — HYDROMORPHONE HYDROCHLORIDE 1 MG/ML
INJECTION, SOLUTION INTRAMUSCULAR; INTRAVENOUS; SUBCUTANEOUS
Status: DISPENSED
Start: 2025-09-03

## (undated) RX ORDER — ACETAMINOPHEN 325 MG/1
TABLET ORAL
Status: DISPENSED
Start: 2019-08-22

## (undated) RX ORDER — TRIAMCINOLONE ACETONIDE 40 MG/ML
INJECTION, SUSPENSION INTRA-ARTICULAR; INTRAMUSCULAR
Status: DISPENSED
Start: 2022-07-05

## (undated) RX ORDER — LIDOCAINE HYDROCHLORIDE 10 MG/ML
INJECTION, SOLUTION EPIDURAL; INFILTRATION; INTRACAUDAL; PERINEURAL
Status: DISPENSED
Start: 2022-07-27

## (undated) RX ORDER — LIDOCAINE HYDROCHLORIDE 10 MG/ML
INJECTION, SOLUTION EPIDURAL; INFILTRATION; INTRACAUDAL; PERINEURAL
Status: DISPENSED
Start: 2022-07-05

## (undated) RX ORDER — OXYCODONE HYDROCHLORIDE 5 MG/1
TABLET ORAL
Status: DISPENSED
Start: 2019-08-22

## (undated) RX ORDER — ONDANSETRON 2 MG/ML
INJECTION INTRAMUSCULAR; INTRAVENOUS
Status: DISPENSED
Start: 2025-09-03

## (undated) RX ORDER — BUPIVACAINE HYDROCHLORIDE 2.5 MG/ML
INJECTION, SOLUTION EPIDURAL; INFILTRATION; INTRACAUDAL
Status: DISPENSED
Start: 2022-07-27

## (undated) RX ORDER — EPHEDRINE SULFATE 50 MG/ML
INJECTION, SOLUTION INTRAMUSCULAR; INTRAVENOUS; SUBCUTANEOUS
Status: DISPENSED
Start: 2025-09-03

## (undated) RX ORDER — TRIAMCINOLONE ACETONIDE 40 MG/ML
INJECTION, SUSPENSION INTRA-ARTICULAR; INTRAMUSCULAR
Status: DISPENSED
Start: 2022-07-27

## (undated) RX ORDER — LIDOCAINE HYDROCHLORIDE 10 MG/ML
INJECTION, SOLUTION EPIDURAL; INFILTRATION; INTRACAUDAL; PERINEURAL
Status: DISPENSED
Start: 2019-04-10